# Patient Record
Sex: MALE | Race: WHITE | NOT HISPANIC OR LATINO | Employment: OTHER | URBAN - METROPOLITAN AREA
[De-identification: names, ages, dates, MRNs, and addresses within clinical notes are randomized per-mention and may not be internally consistent; named-entity substitution may affect disease eponyms.]

---

## 2018-08-14 ENCOUNTER — TRANSCRIBE ORDERS (OUTPATIENT)
Dept: ADMINISTRATIVE | Facility: HOSPITAL | Age: 58
End: 2018-08-14

## 2018-08-14 DIAGNOSIS — R94.39 ABNORMAL STRESS TEST: Primary | ICD-10-CM

## 2018-08-17 ENCOUNTER — HOSPITAL ENCOUNTER (OUTPATIENT)
Facility: HOSPITAL | Age: 58
Setting detail: HOSPITAL OUTPATIENT SURGERY
Discharge: HOME | End: 2018-08-17
Attending: INTERNAL MEDICINE | Admitting: INTERNAL MEDICINE

## 2018-08-17 VITALS
BODY MASS INDEX: 30.65 KG/M2 | OXYGEN SATURATION: 98 % | HEIGHT: 70 IN | DIASTOLIC BLOOD PRESSURE: 124 MMHG | TEMPERATURE: 96.9 F | SYSTOLIC BLOOD PRESSURE: 161 MMHG | HEART RATE: 48 BPM | WEIGHT: 214.07 LBS | RESPIRATION RATE: 18 BRPM

## 2018-08-17 DIAGNOSIS — R94.39 ABNORMAL STRESS TEST: ICD-10-CM

## 2018-08-17 LAB
ANION GAP SERPL CALCULATED.3IONS-SCNC: 5 MMOL/L (ref 3–11)
BUN BLD-MCNC: 24 MG/DL (ref 9–23)
BUN BLDA-MCNC: 27 MG/DL (ref 8–26)
BUN/CREAT SERPL: 17.1 (ref 7–25)
CA-I BLDA-SCNC: 1.22 MMOL/L (ref 1.2–1.32)
CALCIUM SPEC-SCNC: 9.6 MG/DL (ref 8.7–10.4)
CHLORIDE BLDA-SCNC: 97 MMOL/L (ref 98–109)
CHLORIDE SERPL-SCNC: 102 MMOL/L (ref 99–109)
CO2 BLDA-SCNC: 28 MMOL/L (ref 24–29)
CO2 SERPL-SCNC: 30 MMOL/L (ref 20–31)
CREAT BLD-MCNC: 1.4 MG/DL (ref 0.6–1.3)
CREAT BLDA-MCNC: 1.4 MG/DL (ref 0.6–1.3)
DEPRECATED RDW RBC AUTO: 42.5 FL (ref 37–54)
ERYTHROCYTE [DISTWIDTH] IN BLOOD BY AUTOMATED COUNT: 13.1 % (ref 11.3–14.5)
GFR SERPL CREATININE-BSD FRML MDRD: 52 ML/MIN/1.73
GLUCOSE BLD-MCNC: 116 MG/DL (ref 70–100)
GLUCOSE BLDC GLUCOMTR-MCNC: 115 MG/DL (ref 70–130)
HCT VFR BLD AUTO: 43.2 % (ref 38.9–50.9)
HCT VFR BLDA CALC: 43 % (ref 38–51)
HGB BLD-MCNC: 14.7 G/DL (ref 13.1–17.5)
HGB BLDA-MCNC: 14.6 G/DL (ref 12–17)
MCH RBC QN AUTO: 30.1 PG (ref 27–31)
MCHC RBC AUTO-ENTMCNC: 34 G/DL (ref 32–36)
MCV RBC AUTO: 88.3 FL (ref 80–99)
PLATELET # BLD AUTO: 214 10*3/MM3 (ref 150–450)
PMV BLD AUTO: 10.7 FL (ref 6–12)
POTASSIUM BLD-SCNC: 4.1 MMOL/L (ref 3.5–5.5)
POTASSIUM BLDA-SCNC: 4.2 MMOL/L (ref 3.5–4.9)
RBC # BLD AUTO: 4.89 10*6/MM3 (ref 4.2–5.76)
SODIUM BLD-SCNC: 137 MMOL/L (ref 132–146)
SODIUM BLDA-SCNC: 139 MMOL/L (ref 138–146)
WBC NRBC COR # BLD: 9.89 10*3/MM3 (ref 3.5–10.8)

## 2018-08-17 PROCEDURE — 25010000002 BIVALIRUDIN 5 MG/ML: Performed by: INTERNAL MEDICINE

## 2018-08-17 PROCEDURE — 93458 L HRT ARTERY/VENTRICLE ANGIO: CPT | Performed by: INTERNAL MEDICINE

## 2018-08-17 PROCEDURE — 25010000002 MIDAZOLAM PER 1 MG: Performed by: INTERNAL MEDICINE

## 2018-08-17 PROCEDURE — 92978 ENDOLUMINL IVUS OCT C 1ST: CPT | Performed by: INTERNAL MEDICINE

## 2018-08-17 PROCEDURE — C1887 CATHETER, GUIDING: HCPCS | Performed by: INTERNAL MEDICINE

## 2018-08-17 PROCEDURE — 85027 COMPLETE CBC AUTOMATED: CPT | Performed by: INTERNAL MEDICINE

## 2018-08-17 PROCEDURE — 80047 BASIC METABLC PNL IONIZED CA: CPT

## 2018-08-17 PROCEDURE — C1874 STENT, COATED/COV W/DEL SYS: HCPCS | Performed by: INTERNAL MEDICINE

## 2018-08-17 PROCEDURE — C1894 INTRO/SHEATH, NON-LASER: HCPCS | Performed by: INTERNAL MEDICINE

## 2018-08-17 PROCEDURE — 25010000002 FENTANYL CITRATE (PF) 100 MCG/2ML SOLUTION: Performed by: INTERNAL MEDICINE

## 2018-08-17 PROCEDURE — 25010000002 HEPARIN (PORCINE) PER 1000 UNITS: Performed by: INTERNAL MEDICINE

## 2018-08-17 PROCEDURE — 36415 COLL VENOUS BLD VENIPUNCTURE: CPT

## 2018-08-17 PROCEDURE — C1753 CATH, INTRAVAS ULTRASOUND: HCPCS | Performed by: INTERNAL MEDICINE

## 2018-08-17 PROCEDURE — C1769 GUIDE WIRE: HCPCS | Performed by: INTERNAL MEDICINE

## 2018-08-17 PROCEDURE — 80048 BASIC METABOLIC PNL TOTAL CA: CPT | Performed by: INTERNAL MEDICINE

## 2018-08-17 PROCEDURE — C9600 PERC DRUG-EL COR STENT SING: HCPCS | Performed by: INTERNAL MEDICINE

## 2018-08-17 PROCEDURE — 0 IOPAMIDOL PER 1 ML: Performed by: INTERNAL MEDICINE

## 2018-08-17 PROCEDURE — 85014 HEMATOCRIT: CPT

## 2018-08-17 PROCEDURE — 93005 ELECTROCARDIOGRAM TRACING: CPT | Performed by: INTERNAL MEDICINE

## 2018-08-17 DEVICE — XIENCE SIERRA™ EVEROLIMUS ELUTING CORONARY STENT SYSTEM 3.50 MM X 18 MM / RAPID-EXCHANGE
Type: IMPLANTABLE DEVICE | Status: FUNCTIONAL
Brand: XIENCE SIERRA™

## 2018-08-17 DEVICE — XIENCE SIERRA™ EVEROLIMUS ELUTING CORONARY STENT SYSTEM 2.50 MM X 23 MM / RAPID-EXCHANGE
Type: IMPLANTABLE DEVICE | Status: FUNCTIONAL
Brand: XIENCE SIERRA™

## 2018-08-17 RX ORDER — CLOPIDOGREL BISULFATE 75 MG/1
TABLET ORAL AS NEEDED
Status: DISCONTINUED | OUTPATIENT
Start: 2018-08-17 | End: 2018-08-17 | Stop reason: HOSPADM

## 2018-08-17 RX ORDER — CLOPIDOGREL BISULFATE 75 MG/1
75 TABLET ORAL DAILY
Qty: 30 TABLET | Refills: 11 | Status: SHIPPED | OUTPATIENT
Start: 2018-08-17 | End: 2018-08-17

## 2018-08-17 RX ORDER — OMEPRAZOLE 20 MG/1
20 CAPSULE, DELAYED RELEASE ORAL DAILY
COMMUNITY
End: 2020-09-22

## 2018-08-17 RX ORDER — TRIAMTERENE AND HYDROCHLOROTHIAZIDE 37.5; 25 MG/1; MG/1
1 CAPSULE ORAL EVERY MORNING
COMMUNITY
End: 2020-07-21 | Stop reason: ALTCHOICE

## 2018-08-17 RX ORDER — TEMAZEPAM 7.5 MG/1
7.5 CAPSULE ORAL NIGHTLY PRN
Status: DISCONTINUED | OUTPATIENT
Start: 2018-08-17 | End: 2018-08-17 | Stop reason: HOSPADM

## 2018-08-17 RX ORDER — MORPHINE SULFATE 4 MG/ML
1 INJECTION, SOLUTION INTRAMUSCULAR; INTRAVENOUS EVERY 4 HOURS PRN
Status: DISCONTINUED | OUTPATIENT
Start: 2018-08-17 | End: 2018-08-17 | Stop reason: HOSPADM

## 2018-08-17 RX ORDER — HYDROCODONE BITARTRATE AND ACETAMINOPHEN 5; 325 MG/1; MG/1
1 TABLET ORAL EVERY 4 HOURS PRN
Status: DISCONTINUED | OUTPATIENT
Start: 2018-08-17 | End: 2018-08-17 | Stop reason: HOSPADM

## 2018-08-17 RX ORDER — SODIUM CHLORIDE 9 MG/ML
250 INJECTION, SOLUTION INTRAVENOUS CONTINUOUS
Status: ACTIVE | OUTPATIENT
Start: 2018-08-17 | End: 2018-08-17

## 2018-08-17 RX ORDER — ASPIRIN 325 MG
325 TABLET ORAL DAILY
Status: DISCONTINUED | OUTPATIENT
Start: 2018-08-17 | End: 2018-08-17 | Stop reason: HOSPADM

## 2018-08-17 RX ORDER — ASPIRIN 81 MG/1
81 TABLET ORAL DAILY
COMMUNITY

## 2018-08-17 RX ORDER — MELOXICAM 15 MG/1
15 TABLET ORAL DAILY
COMMUNITY
End: 2020-09-22

## 2018-08-17 RX ORDER — ATORVASTATIN CALCIUM 40 MG/1
40 TABLET, FILM COATED ORAL DAILY
COMMUNITY
End: 2020-09-22

## 2018-08-17 RX ORDER — ACETAMINOPHEN 325 MG/1
650 TABLET ORAL EVERY 4 HOURS PRN
Status: DISCONTINUED | OUTPATIENT
Start: 2018-08-17 | End: 2018-08-17 | Stop reason: HOSPADM

## 2018-08-17 RX ORDER — MIDAZOLAM HYDROCHLORIDE 1 MG/ML
INJECTION INTRAMUSCULAR; INTRAVENOUS AS NEEDED
Status: DISCONTINUED | OUTPATIENT
Start: 2018-08-17 | End: 2018-08-17 | Stop reason: HOSPADM

## 2018-08-17 RX ORDER — CARVEDILOL 12.5 MG/1
12.5 TABLET ORAL 2 TIMES DAILY WITH MEALS
COMMUNITY
End: 2022-05-04 | Stop reason: SDUPTHER

## 2018-08-17 RX ORDER — CLOPIDOGREL BISULFATE 75 MG/1
75 TABLET ORAL DAILY
Qty: 30 TABLET | Refills: 11 | Status: SHIPPED | OUTPATIENT
Start: 2018-08-17 | End: 2022-05-04 | Stop reason: ALTCHOICE

## 2018-08-17 RX ORDER — ALPRAZOLAM 0.25 MG/1
0.25 TABLET ORAL 3 TIMES DAILY PRN
Status: DISCONTINUED | OUTPATIENT
Start: 2018-08-17 | End: 2018-08-17 | Stop reason: HOSPADM

## 2018-08-17 RX ORDER — NALOXONE HCL 0.4 MG/ML
0.4 VIAL (ML) INJECTION
Status: DISCONTINUED | OUTPATIENT
Start: 2018-08-17 | End: 2018-08-17 | Stop reason: HOSPADM

## 2018-08-17 RX ORDER — LIDOCAINE HYDROCHLORIDE 10 MG/ML
INJECTION, SOLUTION INFILTRATION; PERINEURAL AS NEEDED
Status: DISCONTINUED | OUTPATIENT
Start: 2018-08-17 | End: 2018-08-17 | Stop reason: HOSPADM

## 2018-08-17 RX ORDER — VARENICLINE TARTRATE 1 MG/1
1 TABLET, FILM COATED ORAL 2 TIMES DAILY
COMMUNITY
End: 2022-05-04

## 2018-08-17 RX ORDER — FENTANYL CITRATE 50 UG/ML
INJECTION, SOLUTION INTRAMUSCULAR; INTRAVENOUS AS NEEDED
Status: DISCONTINUED | OUTPATIENT
Start: 2018-08-17 | End: 2018-08-17 | Stop reason: HOSPADM

## 2018-08-17 RX ADMIN — ASPIRIN 325 MG ORAL TABLET 325 MG: 325 PILL ORAL at 07:05

## 2018-08-17 NOTE — PLAN OF CARE
Problem: Patient Care Overview  Goal: Plan of Care Review  Outcome: Outcome(s) achieved Date Met: 08/17/18 08/17/18 1047   Coping/Psychosocial   Plan of Care Reviewed With patient   Plan of Care Review   Progress no change   OTHER   Outcome Summary Patient's site stable at time of discharge. The patient is being discharged home with family.      Goal: Individualization and Mutuality  Outcome: Outcome(s) achieved Date Met: 08/17/18    Goal: Discharge Needs Assessment  Outcome: Outcome(s) achieved Date Met: 08/17/18    Goal: Interprofessional Rounds/Family Conf  Outcome: Outcome(s) achieved Date Met: 08/17/18

## 2020-07-21 ENCOUNTER — NURSE NAVIGATOR (OUTPATIENT)
Dept: ONCOLOGY | Facility: CLINIC | Age: 60
End: 2020-07-21

## 2020-07-21 ENCOUNTER — OFFICE VISIT (OUTPATIENT)
Dept: PULMONOLOGY | Facility: CLINIC | Age: 60
End: 2020-07-21

## 2020-07-21 VITALS
BODY MASS INDEX: 33.12 KG/M2 | TEMPERATURE: 98.7 F | HEART RATE: 60 BPM | OXYGEN SATURATION: 96 % | WEIGHT: 236.6 LBS | HEIGHT: 71 IN | SYSTOLIC BLOOD PRESSURE: 124 MMHG | DIASTOLIC BLOOD PRESSURE: 76 MMHG

## 2020-07-21 DIAGNOSIS — R91.8 LUNG MASS: ICD-10-CM

## 2020-07-21 DIAGNOSIS — R91.1 LUNG NODULE: Primary | ICD-10-CM

## 2020-07-21 DIAGNOSIS — Z00.6 EXAMINATION FOR NORMAL COMPARISON FOR CLINICAL RESEARCH: Primary | ICD-10-CM

## 2020-07-21 PROCEDURE — 99205 OFFICE O/P NEW HI 60 MIN: CPT | Performed by: INTERNAL MEDICINE

## 2020-07-21 RX ORDER — IRBESARTAN 150 MG/1
150 TABLET ORAL DAILY
COMMUNITY
End: 2022-04-20 | Stop reason: SDUPTHER

## 2020-07-21 NOTE — PROGRESS NOTES
CC:    Lung mass    HPI:    59 y/o WM w/ h/o HTN, HLD, CAD s/p 8 stents last ~ 1 year ago (follows with Dr. Rosen) - remains on DAPT, Tobacco abuse since age 9 / ~ 1ppd / ~50 py - still smoking but trying to quit on chantix, who comes today for evaluation of lung mass.  Patient recently presented to PCP office for chronic non-productive cough x 8-9 months.  He had a LDCT for lung cancer screening and this revealed a lobular irregular marginated mass in the JUDITH measuring 6.5 x 4.4 cm.  There's also a 9 mm RUL nodule and 8 mm RML nodule.  Mediastinal lymph nodes appear relatively normal.    Patient has not had any fever, chills, night sweats, weight loss, hemoptysis, chest pain, etc.  No recent episodes of pneumonia he can recall.  No prior films for comparison.    PMH:    Past Medical History:   Diagnosis Date   • Coronary artery disease    • Hyperlipidemia    • Hypertension      PSH:    Past Surgical History:   Procedure Laterality Date   • APPENDECTOMY     • CARDIAC CATHETERIZATION     • CARDIAC CATHETERIZATION N/A 8/17/2018    Procedure: Left Heart Cath;  Surgeon: Janes Rosen MD;  Location: [facility] CATH INVASIVE LOCATION;  Service: Cardiovascular   • TIBIA FRACTURE SURGERY Bilateral      FH:    No family history on file.  SH:    Social History     Socioeconomic History   • Marital status:      Spouse name: Not on file   • Number of children: Not on file   • Years of education: Not on file   • Highest education level: Not on file   Tobacco Use   • Smoking status: Current Every Day Smoker     Packs/day: 1.00     Years: 50.00     Pack years: 50.00     Types: Cigarettes   • Smokeless tobacco: Never Used   Substance and Sexual Activity   • Alcohol use: Yes     Alcohol/week: 6.0 standard drinks     Types: 6 Cans of beer per week     Comment: weekly   • Drug use: No   • Sexual activity: Defer     ALLERGIES:    No Known Allergies  MEDICATIONS:      Current Outpatient Medications:   •  Alirocumab  (Praluent) 150 MG/ML solution prefilled syringe, Inject  under the skin into the appropriate area as directed., Disp: , Rfl:   •  aspirin 81 MG EC tablet, Take 81 mg by mouth Daily., Disp: , Rfl:   •  atorvastatin (LIPITOR) 40 MG tablet, Take 40 mg by mouth Daily., Disp: , Rfl:   •  carvedilol (COREG) 6.25 MG tablet, Take 6.25 mg by mouth 2 (Two) Times a Day With Meals., Disp: , Rfl:   •  clopidogrel (PLAVIX) 75 MG tablet, Take 1 tablet by mouth Daily., Disp: 30 tablet, Rfl: 11  •  irbesartan (AVAPRO) 150 MG tablet, Take 150 mg by mouth Every Night., Disp: , Rfl:   •  meloxicam (MOBIC) 15 MG tablet, Take 15 mg by mouth Daily., Disp: , Rfl:   •  omeprazole (priLOSEC) 20 MG capsule, Take 20 mg by mouth Daily., Disp: , Rfl:   •  tiotropium bromide-olodaterol (Stiolto Respimat) 2.5-2.5 MCG/ACT aerosol solution inhaler, Inhale Daily., Disp: , Rfl:   •  varenicline (CHANTIX) 1 MG tablet, Take 1 mg by mouth 2 (Two) Times a Day., Disp: , Rfl:   ROS:  Per HPI, otherwise all systems reviewed and negative.    DIAGNOSTIC DATA (Reviewed and interpreted by me unless otherwise specified):    CT Chest 7/16/20 -  lobular irregular marginated mass in the JUDITH measuring 6.5 x 4.4 cm.  There's also a 9 mm RUL nodule and 8 mm RML nodule.  Mediastinal lymph nodes appear relatively normal.  Scarring in peripheral bases.    Vitals:    07/21/20 1046   BP: 124/76   Pulse: 60   Temp: 98.7 °F (37.1 °C)   SpO2: 96%       Physical Exam   Constitutional: Oriented to person, place, and time. Appears well-developed and well-nourished.   Head: Normocephalic and atraumatic.   Nose: Nose normal.   Mouth/Throat: Oropharynx is clear and moist.   Eyes: Conjunctivae are normal.  Pupils normal.  Neck: No tracheal deviation present.   Cardiovascular: Normal rate, regular rhythm, normal heart sounds and intact distal pulses.  Exam reveals no gallop and no friction rub.  No thrill.  No JVD.  No edema.  No murmur heard.  Pulmonary/Chest: Effort normal and  breath sounds normal.  No tenderness to palpation.  No clubbing.   Abdominal: Soft. Bowel sounds are normal. No distension. No tenderness. There is no guarding.   Musculoskeletal: Normal range of motion.  No tenderness.  Lymphadenopathy:  No cervical adenopathy.   Neurological:  No new focal neurological deficits observed   Skin: Skin is warm and dry. No rash noted.   Psychiatric: Normal mood and affect.  Behavior is normal. Judgment normal.    Assessment/Plan     1)  Abnormal CT Chest - concerning for malignancy.  Mediastinal LN's look relatively normal.  I believe highest yield will be a CT guided biopsy.  He is on plavix which will need to be held at discretion of IR.  Would keep on ASA given numerous prior stents (last more than 1 year ago).  If this proves to be malignancy will need PET-CT and MRI brain.  PFT next visit as well.      2)  Tobacco abuse - on chantix, trying to quit    Will call patient with results of biopsy and plan    RTC 4 weeks with Full PFT    Asaf Lobato MD  Pulmonology and Critical Care Medicine  07/21/20 11:57  Electronically Signed    C.C.:  Siddharth Godwin MD, Siddharth Godwin MD

## 2020-07-21 NOTE — PROGRESS NOTES
Met patient in lung nodule clinic with Dr. KEVIN Lobato. Patient has smoked since age 9 and is actively trying to quit now. He developed cough x8-9mo ago and recently had CT chest ordered. CT revealed 6.5 x 4.4cm JUDITH mass. Patient denies hemoptysis, weight loss, fever or new SOA. He did recently start Stiolto with reported improvement in breathing. Scans reviewed. Per Dr. Lobato CT guided biopsy now. Introduced lung navigator role and provided contact information. Plavix will need to be held prior to procedure x5 days. He v/u and agreeable to plan. He knows to call with questions or concerns.

## 2020-07-24 ENCOUNTER — NURSE NAVIGATOR (OUTPATIENT)
Dept: ONCOLOGY | Facility: CLINIC | Age: 60
End: 2020-07-24

## 2020-07-24 NOTE — PROGRESS NOTES
Called patient to ensure he is aware of upcoming appointment dates, times and locations. He is able to recall COVID testing and biopsy information. Reminded patient he would need to stop anticoagulant prior to biopsy. States he needs to call and talk with Dr. Rosen about this. Patient called back and states the office started asking questions he could not answer. Navigator called Dr. Rsoen's office to notify them of plan and ask for directions on stopping anticoagulants. They will fax form to Dr. Lobato's office. Notified pulmonary office to be expecting information from Dr. Rosen's office.

## 2020-07-28 ENCOUNTER — APPOINTMENT (OUTPATIENT)
Dept: PREADMISSION TESTING | Facility: HOSPITAL | Age: 60
End: 2020-07-28

## 2020-07-28 PROCEDURE — U0004 COV-19 TEST NON-CDC HGH THRU: HCPCS

## 2020-07-28 PROCEDURE — C9803 HOPD COVID-19 SPEC COLLECT: HCPCS

## 2020-07-28 PROCEDURE — U0002 COVID-19 LAB TEST NON-CDC: HCPCS

## 2020-07-29 LAB
REF LAB TEST METHOD: NORMAL
SARS-COV-2 RNA RESP QL NAA+PROBE: NOT DETECTED

## 2020-07-30 ENCOUNTER — TELEPHONE (OUTPATIENT)
Dept: INFUSION THERAPY | Facility: HOSPITAL | Age: 60
End: 2020-07-30

## 2020-07-31 ENCOUNTER — HOSPITAL ENCOUNTER (OUTPATIENT)
Dept: CT IMAGING | Facility: HOSPITAL | Age: 60
Discharge: HOME | End: 2020-07-31
Admitting: INTERNAL MEDICINE

## 2020-07-31 ENCOUNTER — HOSPITAL ENCOUNTER (OUTPATIENT)
Dept: GENERAL RADIOLOGY | Facility: HOSPITAL | Age: 60
Discharge: HOME | End: 2020-07-31

## 2020-07-31 VITALS
HEART RATE: 58 BPM | WEIGHT: 237 LBS | HEIGHT: 70 IN | BODY MASS INDEX: 33.93 KG/M2 | OXYGEN SATURATION: 94 % | TEMPERATURE: 97.2 F | SYSTOLIC BLOOD PRESSURE: 151 MMHG | DIASTOLIC BLOOD PRESSURE: 80 MMHG | RESPIRATION RATE: 20 BRPM

## 2020-07-31 DIAGNOSIS — R91.8 LUNG MASS: ICD-10-CM

## 2020-07-31 LAB
APTT PPP: 32.6 SECONDS (ref 24–37)
INR PPP: 0.99 (ref 0.85–1.16)
PLATELET # BLD AUTO: 276 10*3/MM3 (ref 140–450)
PROTHROMBIN TIME: 12.8 SECONDS (ref 11.5–14)

## 2020-07-31 PROCEDURE — 25010000002 MIDAZOLAM PER 1 MG: Performed by: RADIOLOGY

## 2020-07-31 PROCEDURE — 85730 THROMBOPLASTIN TIME PARTIAL: CPT | Performed by: RADIOLOGY

## 2020-07-31 PROCEDURE — 88342 IMHCHEM/IMCYTCHM 1ST ANTB: CPT | Performed by: INTERNAL MEDICINE

## 2020-07-31 PROCEDURE — 85049 AUTOMATED PLATELET COUNT: CPT | Performed by: RADIOLOGY

## 2020-07-31 PROCEDURE — 71045 X-RAY EXAM CHEST 1 VIEW: CPT

## 2020-07-31 PROCEDURE — 99153 MOD SED SAME PHYS/QHP EA: CPT

## 2020-07-31 PROCEDURE — 77012 CT SCAN FOR NEEDLE BIOPSY: CPT

## 2020-07-31 PROCEDURE — 99152 MOD SED SAME PHYS/QHP 5/>YRS: CPT

## 2020-07-31 PROCEDURE — 88341 IMHCHEM/IMCYTCHM EA ADD ANTB: CPT | Performed by: INTERNAL MEDICINE

## 2020-07-31 PROCEDURE — 85610 PROTHROMBIN TIME: CPT | Performed by: RADIOLOGY

## 2020-07-31 PROCEDURE — 88305 TISSUE EXAM BY PATHOLOGIST: CPT | Performed by: INTERNAL MEDICINE

## 2020-07-31 PROCEDURE — 25010000002 FENTANYL CITRATE (PF) 100 MCG/2ML SOLUTION: Performed by: RADIOLOGY

## 2020-07-31 RX ORDER — CHOLECALCIFEROL (VITAMIN D3) 125 MCG
500 CAPSULE ORAL DAILY
COMMUNITY
End: 2022-10-07 | Stop reason: HOSPADM

## 2020-07-31 RX ORDER — SODIUM CHLORIDE 0.9 % (FLUSH) 0.9 %
10 SYRINGE (ML) INJECTION AS NEEDED
Status: DISCONTINUED | OUTPATIENT
Start: 2020-07-31 | End: 2020-08-01 | Stop reason: HOSPADM

## 2020-07-31 RX ORDER — SODIUM CHLORIDE 0.9 % (FLUSH) 0.9 %
3 SYRINGE (ML) INJECTION EVERY 12 HOURS SCHEDULED
Status: DISCONTINUED | OUTPATIENT
Start: 2020-07-31 | End: 2020-08-01 | Stop reason: HOSPADM

## 2020-07-31 RX ORDER — FENTANYL CITRATE 50 UG/ML
INJECTION, SOLUTION INTRAMUSCULAR; INTRAVENOUS
Status: DISPENSED
Start: 2020-07-31 | End: 2020-07-31

## 2020-07-31 RX ORDER — LIDOCAINE HYDROCHLORIDE 10 MG/ML
20 INJECTION, SOLUTION EPIDURAL; INFILTRATION; INTRACAUDAL; PERINEURAL ONCE
Status: COMPLETED | OUTPATIENT
Start: 2020-07-31 | End: 2020-07-31

## 2020-07-31 RX ORDER — MIDAZOLAM HYDROCHLORIDE 1 MG/ML
INJECTION INTRAMUSCULAR; INTRAVENOUS
Status: COMPLETED | OUTPATIENT
Start: 2020-07-31 | End: 2020-07-31

## 2020-07-31 RX ORDER — PANTOPRAZOLE SODIUM 40 MG/1
40 TABLET, DELAYED RELEASE ORAL 2 TIMES DAILY
COMMUNITY
End: 2022-10-18

## 2020-07-31 RX ORDER — CHOLECALCIFEROL (VITAMIN D3) 125 MCG
CAPSULE ORAL DAILY
COMMUNITY
End: 2022-10-07 | Stop reason: HOSPADM

## 2020-07-31 RX ORDER — MIDAZOLAM HYDROCHLORIDE 1 MG/ML
INJECTION INTRAMUSCULAR; INTRAVENOUS
Status: DISPENSED
Start: 2020-07-31 | End: 2020-07-31

## 2020-07-31 RX ORDER — FENTANYL CITRATE 50 UG/ML
INJECTION, SOLUTION INTRAMUSCULAR; INTRAVENOUS
Status: COMPLETED | OUTPATIENT
Start: 2020-07-31 | End: 2020-07-31

## 2020-07-31 RX ORDER — VIT C/HESPERIDIN/BIOFLAVONOIDS 500-100 MG
TABLET ORAL DAILY
COMMUNITY
End: 2022-05-04

## 2020-07-31 RX ADMIN — MIDAZOLAM 1 MG: 1 INJECTION INTRAMUSCULAR; INTRAVENOUS at 08:52

## 2020-07-31 RX ADMIN — FENTANYL CITRATE 50 MCG: 50 INJECTION, SOLUTION INTRAMUSCULAR; INTRAVENOUS at 08:29

## 2020-07-31 RX ADMIN — FENTANYL CITRATE 50 MCG: 50 INJECTION, SOLUTION INTRAMUSCULAR; INTRAVENOUS at 08:36

## 2020-07-31 RX ADMIN — MIDAZOLAM 1 MG: 1 INJECTION INTRAMUSCULAR; INTRAVENOUS at 08:36

## 2020-07-31 RX ADMIN — MIDAZOLAM 2 MG: 1 INJECTION INTRAMUSCULAR; INTRAVENOUS at 08:29

## 2020-07-31 RX ADMIN — FENTANYL CITRATE 50 MCG: 50 INJECTION, SOLUTION INTRAMUSCULAR; INTRAVENOUS at 08:44

## 2020-07-31 RX ADMIN — LIDOCAINE HYDROCHLORIDE 20 ML: 10 INJECTION, SOLUTION EPIDURAL; INFILTRATION; INTRACAUDAL; PERINEURAL at 08:39

## 2020-07-31 RX ADMIN — FENTANYL CITRATE 50 MCG: 50 INJECTION, SOLUTION INTRAMUSCULAR; INTRAVENOUS at 08:52

## 2020-07-31 RX ADMIN — MIDAZOLAM 1 MG: 1 INJECTION INTRAMUSCULAR; INTRAVENOUS at 08:44

## 2020-08-03 ENCOUNTER — TELEPHONE (OUTPATIENT)
Dept: INFUSION THERAPY | Facility: HOSPITAL | Age: 60
End: 2020-08-03

## 2020-08-05 ENCOUNTER — DOCUMENTATION (OUTPATIENT)
Dept: PULMONOLOGY | Facility: CLINIC | Age: 60
End: 2020-08-05

## 2020-08-05 DIAGNOSIS — C34.92 PRIMARY LUNG CANCER, LEFT (HCC): Primary | ICD-10-CM

## 2020-08-05 NOTE — PROGRESS NOTES
Tried to call patient with results of biopsy and got VM.  Will try again tomorrow.    Asaf Lobato MD  Pulmonology and Critical Care Medicine  08/05/20 15:21  Electronically Signed

## 2020-08-06 ENCOUNTER — TELEPHONE (OUTPATIENT)
Dept: PULMONOLOGY | Facility: CLINIC | Age: 60
End: 2020-08-06

## 2020-08-06 NOTE — TELEPHONE ENCOUNTER
Called patient with results of biopsy consistent with adenocarcinoma.  PET-CT, MRI brain, Onc referral pending.  Depending on results may need colonoscopy per pathologist.  Patient has never had one.  He verbalized understanding.    Asaf Lobato MD  Pulmonology and Critical Care Medicine  08/06/20 15:57  Electronically Signed

## 2020-08-07 ENCOUNTER — NURSE NAVIGATOR (OUTPATIENT)
Dept: ONCOLOGY | Facility: CLINIC | Age: 60
End: 2020-08-07

## 2020-08-07 ENCOUNTER — TRANSCRIBE ORDERS (OUTPATIENT)
Dept: PULMONOLOGY | Facility: CLINIC | Age: 60
End: 2020-08-07

## 2020-08-07 NOTE — PROGRESS NOTES
Called patient with medical oncology appointment. He is aware of date, time, location and provider. He understands to be expecting new patient packet in the mail. He is aware of PET and MRI brain appointments on 08/10/20 at [facility] and [facility] respectively and to obtain disc of images to bring to Dr. Haro. Informed him to be NPO except water prior to PET. Answered all questions satisfactorily. He knows to call with questions or concerns.

## 2020-08-11 DIAGNOSIS — C34.92 PRIMARY LUNG CANCER, LEFT (HCC): ICD-10-CM

## 2020-08-13 ENCOUNTER — NURSE NAVIGATOR (OUTPATIENT)
Dept: ONCOLOGY | Facility: CLINIC | Age: 60
End: 2020-08-13

## 2020-08-13 ENCOUNTER — CONSULT (OUTPATIENT)
Dept: ONCOLOGY | Facility: CLINIC | Age: 60
End: 2020-08-13

## 2020-08-13 ENCOUNTER — APPOINTMENT (OUTPATIENT)
Dept: OTHER | Facility: HOSPITAL | Age: 60
End: 2020-08-13

## 2020-08-13 ENCOUNTER — LAB (OUTPATIENT)
Dept: LAB | Facility: HOSPITAL | Age: 60
End: 2020-08-13

## 2020-08-13 VITALS
HEART RATE: 54 BPM | HEIGHT: 70 IN | WEIGHT: 236.7 LBS | TEMPERATURE: 98 F | DIASTOLIC BLOOD PRESSURE: 88 MMHG | OXYGEN SATURATION: 99 % | RESPIRATION RATE: 18 BRPM | SYSTOLIC BLOOD PRESSURE: 157 MMHG | BODY MASS INDEX: 33.89 KG/M2

## 2020-08-13 DIAGNOSIS — R94.39 ABNORMAL STRESS TEST: ICD-10-CM

## 2020-08-13 DIAGNOSIS — Z00.6 EXAMINATION FOR NORMAL COMPARISON FOR CLINICAL RESEARCH: ICD-10-CM

## 2020-08-13 DIAGNOSIS — R94.39 ABNORMAL STRESS TEST: Primary | ICD-10-CM

## 2020-08-13 LAB
ALBUMIN SERPL-MCNC: 4.4 G/DL (ref 3.5–5.2)
ALBUMIN/GLOB SERPL: 1.5 G/DL
ALP SERPL-CCNC: 92 U/L (ref 39–117)
ALT SERPL W P-5'-P-CCNC: 54 U/L (ref 1–41)
ANION GAP SERPL CALCULATED.3IONS-SCNC: 9 MMOL/L (ref 5–15)
AST SERPL-CCNC: 40 U/L (ref 1–40)
BILIRUB SERPL-MCNC: 0.8 MG/DL (ref 0–1.2)
BUN SERPL-MCNC: 13 MG/DL (ref 8–23)
BUN/CREAT SERPL: 13.1 (ref 7–25)
CALCIUM SPEC-SCNC: 9.7 MG/DL (ref 8.6–10.5)
CHLORIDE SERPL-SCNC: 104 MMOL/L (ref 98–107)
CO2 SERPL-SCNC: 27 MMOL/L (ref 22–29)
CREAT SERPL-MCNC: 0.99 MG/DL (ref 0.76–1.27)
ERYTHROCYTE [DISTWIDTH] IN BLOOD BY AUTOMATED COUNT: 12.8 % (ref 12.3–15.4)
GFR SERPL CREATININE-BSD FRML MDRD: 77 ML/MIN/1.73
GLOBULIN UR ELPH-MCNC: 3 GM/DL
GLUCOSE SERPL-MCNC: 99 MG/DL (ref 65–99)
HCT VFR BLD AUTO: 48.6 % (ref 37.5–51)
HGB BLD-MCNC: 16.1 G/DL (ref 13–17.7)
LYMPHOCYTES # BLD AUTO: 2.8 10*3/MM3 (ref 0.7–3.1)
LYMPHOCYTES NFR BLD AUTO: 29.4 % (ref 19.6–45.3)
MCH RBC QN AUTO: 30.1 PG (ref 26.6–33)
MCHC RBC AUTO-ENTMCNC: 33.1 G/DL (ref 31.5–35.7)
MCV RBC AUTO: 91.1 FL (ref 79–97)
MONOCYTES # BLD AUTO: 0.5 10*3/MM3 (ref 0.1–0.9)
MONOCYTES NFR BLD AUTO: 5.3 % (ref 5–12)
NEUTROPHILS NFR BLD AUTO: 6.1 10*3/MM3 (ref 1.7–7)
NEUTROPHILS NFR BLD AUTO: 65.3 % (ref 42.7–76)
PLATELET # BLD AUTO: 192 10*3/MM3 (ref 140–450)
PMV BLD AUTO: 8.2 FL (ref 6–12)
POTASSIUM SERPL-SCNC: 4.9 MMOL/L (ref 3.5–5.2)
PROT SERPL-MCNC: 7.4 G/DL (ref 6–8.5)
RBC # BLD AUTO: 5.33 10*6/MM3 (ref 4.14–5.8)
SODIUM SERPL-SCNC: 140 MMOL/L (ref 136–145)
WBC # BLD AUTO: 9.4 10*3/MM3 (ref 3.4–10.8)

## 2020-08-13 PROCEDURE — 99245 OFF/OP CONSLTJ NEW/EST HI 55: CPT | Performed by: INTERNAL MEDICINE

## 2020-08-13 PROCEDURE — 80053 COMPREHEN METABOLIC PANEL: CPT

## 2020-08-13 PROCEDURE — 85025 COMPLETE CBC W/AUTO DIFF WBC: CPT

## 2020-08-13 PROCEDURE — 36415 COLL VENOUS BLD VENIPUNCTURE: CPT

## 2020-08-13 NOTE — PROGRESS NOTES
Met patient and daughter in medical oncology appointment with Dr. Haro. PET, MRI brain and pathology discussed at length. Due to staining on tissue will refer patient for colonoscopy and EGD. He will need cardiac clearance from Dr. Rosen for possible thoracic surgery in light of dual anticoagulant therapy. He will also need PFT's. These appointments and procedures will need to be made expeditiously as patient will f/u with Dr. Haro 08/27/20 to make final treatment decision based on these findings. He will need to be presented at tumor board prior to f/u with Dr. Haro as well. Nurse navigator to help in this process. Disc of PET and MRI taken to radiology to be uploaded into Epic. Patient knows to call with questions or concerns.

## 2020-08-13 NOTE — PROGRESS NOTES
CHIEF COMPLAINT: Adenocarcinoma of lung versus metastasis from occult GI primary    REASON FOR REFERRAL: Same      RECORDS OBTAINED  Records of the patients history including those obtained from Asaf Lobato were reviewed and summarized in detail.    Oncology/Hematology History    1.  Probable adenocarcinoma of the lung versus metastasis from GI tract for tissue staining but no evidence of such on PET  2.  Hypertension  3.  Hyperlipidemia  4.  Coronary disease status post 8 stents with Dr. Rosen 2019 remains on dual antiplatelet therapy  5.  50-pack-year smoker persistent      -Summer 2020 presented to primary care with chronic cough.  -CT chest done at [facility] in [place] showed 6.5 cm left upper lobe mass with 2 noncalcified nodules in the right lung, 9 mm inferior right upper lobe, 8 mm right middle lobe, and scarring with fibrosis.  -7/31/2020 left upper lobe CT-guided lung biopsy showed adenocarcinoma with mucinous features diffuse positive for CK 7 and patchy positive for CK 20, moderately positive for CEA and CDX 2 and negative for TTF-1 and Napsin.  -8/10/2020 PET scan [facility] shows left upper lobe mass SUV 11.3, 6.5 cm.  6 mm inferior right middle lobe nodule not PET avid.  MRI brain done at [facility] showed white matter changes but no metastasis.  -8/13/2020 [facility] initial consultation: There is still some uncertainty as to whether this is metastatic disease to the lung with a large metastasis and other smaller non-PET avid foci versus a lung primary with non-PET avid small lung nodules that may yet be metastatic but not hypermetabolic due to their small size versus a lung primary that should be resected and the other lesions are just benign and therefore not PET avid.  With dual antiplatelet therapy and multiple coronary vessel disease there will be significant operative risk that will need to be addressed by Dr. Rosen.  Would  recommend upper or lower GI endoscopy given the CDX 2 and the CK7 positivity for completeness sake especially since the TTF-1 is negative and yet this looks like an adenocarcinoma.  Per my discussion with Dr. Asaf Lobato, we will get him to Dr. Rosen for cardiac clearance and to determine whether he can come off any of his antiplatelet therapies, to gastroenterology for upper and lower GI endoscopy to be sure that there is no occult GI primary that has now metastasized to the lung, to Dr. Lobato's office for pulmonary function testing for adequacy of lung function, and we will have Alina Strauss to present him at multidisciplinary lung conference in the next couple of weeks once this data is available to decide on our plan of attack and ultimately to Dr. Luis Booth for surgical resection as appropriate.  We will get his discs from outside PET and MRI brain downloaded into our system.        Abnormal stress test       HISTORY OF PRESENT ILLNESS:  The patient is a 60 y.o.  male, referred for possible lung cancer versus metastasis from GI primary.  See above for oncology history of present illness.  Has also had significant cardiovascular disease as outlined above.    REVIEW OF SYSTEMS:  A 14 point review of systems was performed and is negative except as noted above.    Past Medical History:   Diagnosis Date   • Anticoagulated    • COPD (chronic obstructive pulmonary disease) (CMS/HCC)    • Coronary artery disease    • Fractures    • GERD (gastroesophageal reflux disease)    • Heart murmur    • Hyperlipidemia    • Hypertension    • Lung mass     lung biopsy today 7/31/2020   • Prediabetes      Past Surgical History:   Procedure Laterality Date   • AMPUTATION FINGER / THUMB Right    • APPENDECTOMY     • CARDIAC CATHETERIZATION     • CARDIAC CATHETERIZATION N/A 8/17/2018    Procedure: Left Heart Cath;  Surgeon: Janes Rosen MD;  Location: [facility] CATH INVASIVE LOCATION;  Service: Cardiovascular   •  CORONARY STENT PLACEMENT      x8   • TIBIA FRACTURE SURGERY Bilateral    • TRIGGER FINGER RELEASE Bilateral        Current Outpatient Medications on File Prior to Visit   Medication Sig Dispense Refill   • Alirocumab (Praluent) 150 MG/ML solution prefilled syringe Inject  under the skin into the appropriate area as directed.     • aspirin 81 MG EC tablet Take 81 mg by mouth Daily.     • atorvastatin (LIPITOR) 40 MG tablet Take 40 mg by mouth Daily.     • carvedilol (COREG) 6.25 MG tablet Take 12.5 mg by mouth 2 (Two) Times a Day With Meals.     • Cholecalciferol (VITAMIN D3) 50 MCG (2000 UT) tablet Take  by mouth Daily.     • clopidogrel (PLAVIX) 75 MG tablet Take 1 tablet by mouth Daily. 30 tablet 11   • irbesartan (AVAPRO) 150 MG tablet Take 150 mg by mouth Daily.     • meloxicam (MOBIC) 15 MG tablet Take 15 mg by mouth Daily.     • metFORMIN (GLUCOPHAGE) 500 MG tablet Take 500 mg by mouth Every Evening.     • omeprazole (priLOSEC) 20 MG capsule Take 20 mg by mouth Daily.     • pantoprazole (PROTONIX) 40 MG EC tablet Take 40 mg by mouth Daily.     • tiotropium bromide-olodaterol (Stiolto Respimat) 2.5-2.5 MCG/ACT aerosol solution inhaler Inhale Daily.     • varenicline (CHANTIX) 1 MG tablet Take 1 mg by mouth 2 (Two) Times a Day.     • vitamin B-12 (CYANOCOBALAMIN) 500 MCG tablet Take 500 mcg by mouth Daily.     • Zinc 30 MG tablet Take  by mouth Daily.       No current facility-administered medications on file prior to visit.        No Known Allergies    Social History     Socioeconomic History   • Marital status:      Spouse name: Not on file   • Number of children: Not on file   • Years of education: Not on file   • Highest education level: Not on file   Tobacco Use   • Smoking status: Former Smoker     Packs/day: 1.00     Years: 45.00     Pack years: 45.00     Types: Cigarettes     Start date: 7/6/2020   • Smokeless tobacco: Never Used   Substance and Sexual Activity   • Alcohol use: Yes      "Alcohol/week: 6.0 standard drinks     Types: 6 Cans of beer per week     Comment: weekly   • Drug use: No   • Sexual activity: Defer       History reviewed. No pertinent family history.    PHYSICAL EXAM:    /88   Pulse 54   Temp 98 °F (36.7 °C) (Temporal)   Resp 18   Ht 177.8 cm (70\")   Wt 107 kg (236 lb 11.2 oz)   SpO2 99%   BMI 33.96 kg/m²     ECOG: (0) Fully Active - Able to Carry On All Pre-disease Performance Without Restriction  General: well appearing male in no acute distress  HEENT: sclera anicteric, oropharynx clear  Lymphatics: no cervical, supraclavicular, inguinal, or axillary adenopathy  Cardiovascular: regular rate and rhythm, no murmurs  Neck: Supple; No thyromegaly  Lungs: clear to auscultation bilaterally. No respiratory distress.   Abdomen: soft, nontender, nondistended.  No palpable organomegaly  Extremities: no cyanosis, clubbing, edema, or cords  Skin: no rashes, lesions, bruising, or petechiae  Neuro: Alert and oriented x 4; Moving all extremities.  Psych: No anxiety or depression    Lab Results   Component Value Date    HGB 14.6 08/17/2018    HCT 43 08/17/2018    MCV 88.3 08/17/2018     07/31/2020    WBC 9.89 08/17/2018     Lab Results   Component Value Date    GLUCOSE 116 (H) 08/17/2018    BUN 24 (H) 08/17/2018    CREATININE 1.40 (H) 08/17/2018     08/17/2018    K 4.1 08/17/2018     08/17/2018    CO2 30.0 08/17/2018    CALCIUM 9.6 08/17/2018           Assessment/Plan   1.  Probable adenocarcinoma of the lung versus metastasis from GI tract for tissue staining but no evidence of such on PET  2.  Hypertension  3.  Hyperlipidemia  4.  Coronary disease status post 8 stents with Dr. Rosen 2019 remains on dual antiplatelet therapy  5.  50-pack-year smoker persistent      -Summer 2020 presented to primary care with chronic cough.  -CT chest done at [facility] in [place] showed 6.5 cm left upper lobe mass with 2 noncalcified nodules in the right " lung, 9 mm inferior right upper lobe, 8 mm right middle lobe, and scarring with fibrosis.  -7/31/2020 left upper lobe CT-guided lung biopsy showed adenocarcinoma with mucinous features diffuse positive for CK 7 and patchy positive for CK 20, moderately positive for CEA and CDX 2 and negative for TTF-1 and Napsin.  -8/10/2020 PET scan [facility] shows left upper lobe mass SUV 11.3, 6.5 cm.  6 mm inferior right middle lobe nodule not PET avid.  MRI brain done at [facility] showed white matter changes but no metastasis.  -8/13/2020 [facility] oncology initial consultation: There is still some uncertainty as to whether this is metastatic disease to the lung with a large metastasis and other smaller non-PET avid foci versus a lung primary with non-PET avid small lung nodules that may yet be metastatic but not hypermetabolic due to their small size versus a lung primary that should be resected and the other lesions are just benign and therefore not PET avid.  With dual antiplatelet therapy and multiple coronary vessel disease there will be significant operative risk that will need to be addressed by Dr. Rosen.  Would recommend upper or lower GI endoscopy given the CDX 2 and the CK7 positivity for completeness sake especially since the TTF-1 is negative and yet this looks like an adenocarcinoma.  Per my discussion with Dr. Asaf Lobato, we will get him to Dr. Rosen for cardiac clearance and to determine whether he can come off any of his antiplatelet therapies, to gastroenterology for upper and lower GI endoscopy to be sure that there is no occult GI primary that has now metastasized to the lung, to Dr. Lobato's office for pulmonary function testing for adequacy of lung function, and we will have Alina Strauss to present him at multidisciplinary lung conference in the next couple of weeks once this data is available to decide on our plan of attack and ultimately to Dr. Luis Booth  for surgical resection as appropriate.  We will get his discs from outside PET and MRI brain downloaded into our system.  Discussed with patient face-to-face as well as with nurse navigator as well as with Dr. Asaf Lobato 1 hour greater than 50% spent counseling regarding this plan as outlined above.        Daniel Haro MD    8/13/2020

## 2020-08-14 ENCOUNTER — TELEPHONE (OUTPATIENT)
Dept: GASTROENTEROLOGY | Facility: CLINIC | Age: 60
End: 2020-08-14

## 2020-08-14 NOTE — TELEPHONE ENCOUNTER
Alina Strauss  Nurse Navigator called re: pt appt calendar and request a return call. Returned call to Alina, advised of pt arrival/appt time for EGD & Colonoscopy. Also confirmed COVID testing time. Understanding was voiced and no further questions at this time.

## 2020-08-14 NOTE — TELEPHONE ENCOUNTER
I spoke with Mr Morley this morning.Alishaeint agreed to call Dr Rosen's office this morning to see if he needs an appointment to get a cardiac clearance. I will also send Cardiac clearance form to Dr Rosen's office.

## 2020-08-14 NOTE — TELEPHONE ENCOUNTER
Patient is scheduled for an EGD & Colonoscopy on 8/25.  Per notes with Dr Haro yesterday, he will need to see Dr. Rosen for cardiac clearance to determine whether he can come off any of his antiplatelet therapies.

## 2020-08-17 DIAGNOSIS — Z12.11 SCREENING FOR COLON CANCER: Primary | ICD-10-CM

## 2020-08-17 RX ORDER — SODIUM, POTASSIUM,MAG SULFATES 17.5-3.13G
1 SOLUTION, RECONSTITUTED, ORAL ORAL TAKE AS DIRECTED
Qty: 2 BOTTLE | Refills: 0 | Status: ON HOLD | OUTPATIENT
Start: 2020-08-17 | End: 2020-09-23

## 2020-08-19 NOTE — TELEPHONE ENCOUNTER
I called Mr Morley to inform him that Dr Rosen approved him to stop his Plavix 7 days before his procedure but do not stop Aspirin. Patient voiced understanding.

## 2020-08-21 ENCOUNTER — APPOINTMENT (OUTPATIENT)
Dept: PET IMAGING | Facility: HOSPITAL | Age: 60
End: 2020-08-21

## 2020-08-21 ENCOUNTER — APPOINTMENT (OUTPATIENT)
Dept: MRI IMAGING | Facility: HOSPITAL | Age: 60
End: 2020-08-21

## 2020-08-23 ENCOUNTER — APPOINTMENT (OUTPATIENT)
Dept: PREADMISSION TESTING | Facility: HOSPITAL | Age: 60
End: 2020-08-23

## 2020-08-23 PROCEDURE — C9803 HOPD COVID-19 SPEC COLLECT: HCPCS

## 2020-08-23 PROCEDURE — U0004 COV-19 TEST NON-CDC HGH THRU: HCPCS

## 2020-08-23 PROCEDURE — U0002 COVID-19 LAB TEST NON-CDC: HCPCS

## 2020-08-24 LAB
REF LAB TEST METHOD: NORMAL
SARS-COV-2 RNA RESP QL NAA+PROBE: NOT DETECTED

## 2020-08-25 ENCOUNTER — OUTSIDE FACILITY SERVICE (OUTPATIENT)
Dept: GASTROENTEROLOGY | Facility: CLINIC | Age: 60
End: 2020-08-25

## 2020-08-25 PROCEDURE — 45385 COLONOSCOPY W/LESION REMOVAL: CPT | Performed by: INTERNAL MEDICINE

## 2020-08-25 PROCEDURE — 88305 TISSUE EXAM BY PATHOLOGIST: CPT | Performed by: INTERNAL MEDICINE

## 2020-08-25 PROCEDURE — 43239 EGD BIOPSY SINGLE/MULTIPLE: CPT | Performed by: INTERNAL MEDICINE

## 2020-08-26 ENCOUNTER — LAB REQUISITION (OUTPATIENT)
Dept: LAB | Facility: HOSPITAL | Age: 60
End: 2020-08-26

## 2020-08-26 ENCOUNTER — OFFICE VISIT (OUTPATIENT)
Dept: PULMONOLOGY | Facility: CLINIC | Age: 60
End: 2020-08-26

## 2020-08-26 DIAGNOSIS — R94.39 ABNORMAL RESULT OF OTHER CARDIOVASCULAR FUNCTION STUDY: ICD-10-CM

## 2020-08-26 DIAGNOSIS — R06.02 SOB (SHORTNESS OF BREATH): Primary | ICD-10-CM

## 2020-08-26 PROCEDURE — 94375 RESPIRATORY FLOW VOLUME LOOP: CPT | Performed by: INTERNAL MEDICINE

## 2020-08-26 PROCEDURE — 94729 DIFFUSING CAPACITY: CPT | Performed by: INTERNAL MEDICINE

## 2020-08-26 PROCEDURE — 94726 PLETHYSMOGRAPHY LUNG VOLUMES: CPT | Performed by: INTERNAL MEDICINE

## 2020-08-27 ENCOUNTER — NURSE NAVIGATOR (OUTPATIENT)
Dept: ONCOLOGY | Facility: CLINIC | Age: 60
End: 2020-08-27

## 2020-08-27 ENCOUNTER — OFFICE VISIT (OUTPATIENT)
Dept: ONCOLOGY | Facility: CLINIC | Age: 60
End: 2020-08-27

## 2020-08-27 VITALS
BODY MASS INDEX: 34.07 KG/M2 | HEIGHT: 70 IN | WEIGHT: 238 LBS | SYSTOLIC BLOOD PRESSURE: 144 MMHG | TEMPERATURE: 97.8 F | OXYGEN SATURATION: 94 % | DIASTOLIC BLOOD PRESSURE: 78 MMHG | RESPIRATION RATE: 18 BRPM | HEART RATE: 66 BPM

## 2020-08-27 DIAGNOSIS — C34.92 ADENOCARCINOMA OF LEFT LUNG (HCC): Chronic | ICD-10-CM

## 2020-08-27 DIAGNOSIS — R94.39 ABNORMAL STRESS TEST: Primary | Chronic | ICD-10-CM

## 2020-08-27 LAB
CYTO UR: NORMAL
LAB AP CASE REPORT: NORMAL
LAB AP CLINICAL INFORMATION: NORMAL
PATH REPORT.FINAL DX SPEC: NORMAL
PATH REPORT.GROSS SPEC: NORMAL

## 2020-08-27 PROCEDURE — 99215 OFFICE O/P EST HI 40 MIN: CPT | Performed by: INTERNAL MEDICINE

## 2020-08-27 RX ORDER — METFORMIN HYDROCHLORIDE 500 MG/1
TABLET, EXTENDED RELEASE ORAL
COMMUNITY
Start: 2020-08-20 | End: 2022-10-05

## 2020-08-27 NOTE — PROGRESS NOTES
CHIEF COMPLAINT: Adenocarcinoma of the lung    Problem List:  Oncology/Hematology History    1.  Probable adenocarcinoma of the lung versus metastasis from GI tract for tissue staining but no evidence of such on PET  2.  Hypertension  3.  Hyperlipidemia  4.  Coronary disease status post 8 stents with Dr. Rosen 2019 remains on dual antiplatelet therapy  5.  50-pack-year smoker persistent      -Summer 2020 presented to primary care with chronic cough.  -CT chest done at [facility] in [place] showed 6.5 cm left upper lobe mass with 2 noncalcified nodules in the right lung, 9 mm inferior right upper lobe, 8 mm right middle lobe, and scarring with fibrosis.  -7/31/2020 left upper lobe CT-guided lung biopsy showed adenocarcinoma with mucinous features diffuse positive for CK 7 and patchy positive for CK 20, moderately positive for CEA and CDX 2 and negative for TTF-1 and Napsin.  -8/10/2020 PET scan [facility] regional imaging shows left upper lobe mass SUV 11.3, 6.5 cm.  6 mm inferior right middle lobe nodule not PET avid.  MRI brain done at [facility] showed white matter changes but no metastasis.  -8/13/2020 [facility] medical oncology initial consultation: There is still some uncertainty as to whether this is metastatic disease to the lung with a large metastasis and other smaller non-PET avid foci versus a lung primary with non-PET avid small lung nodules that may yet be metastatic but not hypermetabolic due to their small size versus a lung primary that should be resected and the other lesions are just benign and therefore not PET avid.  With dual antiplatelet therapy and multiple coronary vessel disease there will be significant operative risk that will need to be addressed by Dr. Rosen.  Would recommend upper or lower GI endoscopy given the CDX 2 and the CK7 positivity for completeness sake especially since the TTF-1 is negative and yet this looks like an adenocarcinoma.  Per my  discussion with Dr. Asaf Lobato, we will get him to Dr. Rosen for cardiac clearance and to determine whether he can come off any of his antiplatelet therapies, to gastroenterology for upper and lower GI endoscopy to be sure that there is no occult GI primary that has now metastasized to the lung, to Dr. Lobato's office for pulmonary function testing for adequacy of lung function, and we will have Alina Strauss to present him at multidisciplinary lung conference in the next couple of weeks once this data is available to decide on our plan of attack and ultimately to Dr. Luis Booth for surgical resection as appropriate.  We will get his discs from outside PET and MRI brain downloaded into our system.      -8/25/2020 EGD and colonoscopy showed esophagitis.  Colonoscopy showed polyp with diverticulosis and hemorrhoids of the transverse and ascending colon.  -8/26/2020 pulmonary function test showed FEV1 of 2.51 L and DLCO 73% of predicted.  Patient communicated with Dr. Rosen who is just seeing him back in June who cleared him for surgery.  He can stop his Plavix 7 days before his procedures but cannot stop his aspirin.    -8/27/2020 [facility] follow-up visit: Presuming the pathology from the EGD and colonoscopy comes back benign and that all that was seen most just a polyp, and given that his pulmonary functions appear adequate, I have spoken with Dr. Steve Booth to consider resection of this and he will coordinate with Dr. Rosen the antiplatelet therapy and whether there needs to be cessation of such.  This will have less to do with surgery and more to do with postop epidural anesthesia risk and I will let Dr. Rosen and Dr. Booth negotiate that path.  I will follow-up the patient in a few weeks presumably postoperatively to discuss adjuvant therapy as indicated based on the pathological stage.        Abnormal stress test       HISTORY OF PRESENT ILLNESS:  The patient is a 60 y.o. male, here  for follow up on management of stage IIb adenocarcinoma of the lung with colonoscopy and EGD apparently showing no GI primary despite the odd staining pattern.  Clinically this fits most with adenocarcinoma of the lung as well.      Past Medical History:   Diagnosis Date   • Anticoagulated    • COPD (chronic obstructive pulmonary disease) (CMS/HCC)    • Coronary artery disease    • Fractures    • GERD (gastroesophageal reflux disease)    • Heart murmur    • Hyperlipidemia    • Hypertension    • Lung mass     lung biopsy today 7/31/2020   • Prediabetes      Past Surgical History:   Procedure Laterality Date   • AMPUTATION FINGER / THUMB Right    • APPENDECTOMY     • CARDIAC CATHETERIZATION     • CARDIAC CATHETERIZATION N/A 8/17/2018    Procedure: Left Heart Cath;  Surgeon: Janes Rosen MD;  Location: [facility] CATH INVASIVE LOCATION;  Service: Cardiovascular   • COLONOSCOPY     • CORONARY STENT PLACEMENT      x8   • TIBIA FRACTURE SURGERY Bilateral    • TRIGGER FINGER RELEASE Bilateral        No Known Allergies    Family History and Social History reviewed and changed as necessary      REVIEW OF SYSTEM:   Review of Systems   Constitutional: Negative for appetite change, chills, diaphoresis, fatigue, fever and unexpected weight change.   HENT:   Negative for mouth sores, sore throat and trouble swallowing.    Eyes: Negative for icterus.   Respiratory: Negative for cough, hemoptysis and shortness of breath.    Cardiovascular: Negative for chest pain, leg swelling and palpitations.   Gastrointestinal: Negative for abdominal distention, abdominal pain, blood in stool, constipation, diarrhea, nausea and vomiting.   Endocrine: Negative for hot flashes.   Genitourinary: Negative for bladder incontinence, difficulty urinating, dysuria, frequency and hematuria.    Musculoskeletal: Negative for gait problem, neck pain and neck stiffness.   Skin: Negative for rash.   Neurological: Negative for dizziness, gait problem,  "headaches, light-headedness and numbness.   Hematological: Negative for adenopathy. Does not bruise/bleed easily.   Psychiatric/Behavioral: Negative for depression. The patient is not nervous/anxious.    All other systems reviewed and are negative.       PHYSICAL EXAM    Vitals:    08/27/20 0826   BP: 144/78   Pulse: 66   Resp: 18   Temp: 97.8 °F (36.6 °C)   SpO2: 94%   Weight: 108 kg (238 lb)   Height: 177.8 cm (70\")     Vitals:    08/27/20 0826   PainSc: 0-No pain        Constitutional: Appears well-developed and well-nourished. No distress.   ECOG: (0) Fully Active - Able to Carry On All Pre-disease Performance Without Restriction  HENT:   Head: Normocephalic.   Mouth/Throat: Oropharynx is clear and moist.   Eyes: Conjunctivae are normal. Pupils are equal, round, and reactive to light. No scleral icterus.   Neck: Neck supple. No JVD present. No thyromegaly present.   Cardiovascular: Normal rate, regular rhythm and normal heart sounds.    Pulmonary/Chest: Breath sounds normal. No respiratory distress.   Abdominal: Soft. Exhibits no distension and no mass. There is no hepatosplenomegaly. There is no tenderness. There is no rebound and no guarding.   Musculoskeletal:Exhibits no edema, tenderness or deformity.   Neurological: Alert and oriented to person, place, and time. Exhibits normal muscle tone.   Skin: No ecchymosis, no petechiae and no rash noted. Not diaphoretic. No cyanosis. Nails show no clubbing.   Psychiatric: Normal mood and affect.   Vitals reviewed.      Lab Results   Component Value Date    HGB 16.1 08/13/2020    HCT 48.6 08/13/2020    MCV 91.1 08/13/2020     08/13/2020    WBC 9.40 08/13/2020    NEUTROABS 6.10 08/13/2020    LYMPHSABS 2.80 08/13/2020    MONOSABS 0.50 08/13/2020       Lab Results   Component Value Date    GLUCOSE 99 08/13/2020    BUN 13 08/13/2020    CREATININE 0.99 08/13/2020     08/13/2020    K 4.9 08/13/2020     08/13/2020    CO2 27.0 08/13/2020    CALCIUM 9.7 " 08/13/2020    PROTEINTOT 7.4 08/13/2020    ALBUMIN 4.40 08/13/2020    BILITOT 0.8 08/13/2020    ALKPHOS 92 08/13/2020    AST 40 08/13/2020    ALT 54 (H) 08/13/2020                   ASSESSMENT & PLAN:  1.  Stage IIb T3 N0 M0 clinical stage adenocarcinoma of the lung versus metastasis from GI tract for tissue staining but no evidence of such on PET or on colonoscopy/EGD  2.  Hypertension  3.  Hyperlipidemia  4.  Coronary disease status post 8 stents with Dr. Rosen 2019 remains on dual antiplatelet therapy  5.  50-pack-year smoker persistent      -Summer 2020 presented to primary care with chronic cough.  -CT chest done at [facility] in [place] showed 6.5 cm left upper lobe mass with 2 noncalcified nodules in the right lung, 9 mm inferior right upper lobe, 8 mm right middle lobe, and scarring with fibrosis.  -7/31/2020 left upper lobe CT-guided lung biopsy showed adenocarcinoma with mucinous features diffuse positive for CK 7 and patchy positive for CK 20, moderately positive for CEA and CDX 2 and negative for TTF-1 and Napsin.  -8/10/2020 PET scan [facility] regional imaging shows left upper lobe mass SUV 11.3, 6.5 cm.  6 mm inferior right middle lobe nodule not PET avid.  MRI brain done at [facility] showed white matter changes but no metastasis.  -8/13/2020 [facility] initial consultation: There is still some uncertainty as to whether this is metastatic disease to the lung with a large metastasis and other smaller non-PET avid foci versus a lung primary with non-PET avid small lung nodules that may yet be metastatic but not hypermetabolic due to their small size versus a lung primary that should be resected and the other lesions are just benign and therefore not PET avid.  With dual antiplatelet therapy and multiple coronary vessel disease there will be significant operative risk that will need to be addressed by Dr. Rosen.  Would recommend upper or lower GI endoscopy  given the CDX 2 and the CK7 positivity for completeness sake especially since the TTF-1 is negative and yet this looks like an adenocarcinoma.  Per my discussion with Dr. Asaf Lobato, we will get him to Dr. Rosen for cardiac clearance and to determine whether he can come off any of his antiplatelet therapies, to gastroenterology for upper and lower GI endoscopy to be sure that there is no occult GI primary that has now metastasized to the lung, to Dr. Lobato's office for pulmonary function testing for adequacy of lung function, and we will have Alina Strauss to present him at multidisciplinary lung conference in the next couple of weeks once this data is available to decide on our plan of attack and ultimately to Dr. Luis Booth for surgical resection as appropriate.  We will get his discs from outside PET and MRI brain downloaded into our system.      -8/25/2020 EGD and colonoscopy showed esophagitis.  Colonoscopy showed polyp with diverticulosis and hemorrhoids of the transverse and ascending colon.  -8/26/2020 pulmonary function test showed FEV1 of 2.51 L and DLCO 73% of predicted.  Patient communicated with Dr. Rosen who is just seeing him back in June who cleared him for surgery.  He can stop his Plavix 7 days before his procedures but cannot stop his aspirin.    -8/27/2020 [facility] oncology follow-up visit: Presuming the pathology from the EGD and colonoscopy comes back benign and that all that was seen most just a polyp, and given that his pulmonary functions appear adequate, I have spoken with Dr. Steve Booth to consider resection of this and he will coordinate with Dr. Rosen the antiplatelet therapy and whether there needs to be cessation of such.  This will have less to do with surgery and more to do with postop epidural anesthesia risk and I will let Dr. Rosen and Dr. Booth negotiate that path.  I will follow-up the patient in a few weeks presumably postoperatively to discuss adjuvant  therapy as indicated based on the pathological stage.  We will get Jose Antonio MI profile sent on his original biopsy so that we can have molecular testing to guide us if there is sufficient tissue.  If not we will send it on the resection specimen.  This is important as adjuvant therapy does not always entail chemotherapy.  As to the contralateral 2 small PET negative lung nodules, I suspect this is not metastatic but likely benign but it will need close attention on follow-up imaging postoperatively but I would give him the benefit of the doubt and will resect the obvious 6-1/2 cm lung mass if feasible.    Discussed with patient face-to-face 1 hour greater than 50% spent counseling regarding this plan as outlined above    Daniel Haro MD    08/27/2020

## 2020-08-27 NOTE — PROGRESS NOTES
Met patient and daughter in clinic with Dr. Haro today. Patient had PFT's and EGD/colonoscopy completed earlier this week. PFT's are acceptable for surgical resection, pathology still pending on GI workup. Per Dr. Haro patient needs to be seen by Dr. Rosen to evaluate for ability to discontinue dual anticoagulant therapy in preparation for thoracotomy and epidural pain pump with Dr. Booth. Patient will call Dr. Rosen and get that set up in the near future. Dr. Haro' office will refer patient to Dr. Booth for surgical evaluation. Dr. Haro' office will also investigate if there is enough lung tissue from biopsy to send CARIS testing now. Navigator will monitor GI pathology results and continue to follow patient for further needs. He knows to call with questions or concerns.

## 2020-08-27 NOTE — PROGRESS NOTES
Patient called stating he has appointment with Dr. Rosen 08/31/20 at 1015. He knows to call with questions or concerns.

## 2020-08-31 ENCOUNTER — MDT ASSESSMENT (OUTPATIENT)
Dept: ONCOLOGY | Facility: CLINIC | Age: 60
End: 2020-08-31

## 2020-09-01 ENCOUNTER — NURSE NAVIGATOR (OUTPATIENT)
Dept: ONCOLOGY | Facility: CLINIC | Age: 60
End: 2020-09-01

## 2020-09-01 ENCOUNTER — TELEPHONE (OUTPATIENT)
Dept: GASTROENTEROLOGY | Facility: CLINIC | Age: 60
End: 2020-09-01

## 2020-09-01 NOTE — PROGRESS NOTES
1. Called patient 08/31/20 to inquire about cardiology appointment. States Dr. Rosen preformed several tests and will notify him with results and decision for stopping DAPT. Also informed patient of EGD/colonoscopy results which are benign. Informed him Dr. Sarmiento's office would likely be in contact with him regarding further plan post scope. He v/u and knows to call with questions or concerns.   2. Patient presented at lung conference 09/01/20. Per that discussion patient needs to proceed with surgery followed by adjuvant chemotherapy. Notified Dr. Haro and Dr. Booth's office.

## 2020-09-01 NOTE — TELEPHONE ENCOUNTER
----- Message from Dwight Sarmiento MD sent at 8/31/2020 10:20 PM EDT -----  Let Mr. Morley know there were several adenoma polyps and he will need a repeat exam in 3 years.  Thank you,  ASIYA

## 2020-09-14 ENCOUNTER — OFFICE VISIT (OUTPATIENT)
Dept: CARDIAC SURGERY | Facility: CLINIC | Age: 60
End: 2020-09-14

## 2020-09-14 VITALS
DIASTOLIC BLOOD PRESSURE: 85 MMHG | WEIGHT: 242.8 LBS | HEIGHT: 70 IN | BODY MASS INDEX: 34.76 KG/M2 | TEMPERATURE: 97.8 F | OXYGEN SATURATION: 97 % | SYSTOLIC BLOOD PRESSURE: 152 MMHG | HEART RATE: 66 BPM

## 2020-09-14 DIAGNOSIS — C34.12 MALIGNANT NEOPLASM OF UPPER LOBE OF LEFT LUNG (HCC): Primary | ICD-10-CM

## 2020-09-14 PROCEDURE — 99205 OFFICE O/P NEW HI 60 MIN: CPT | Performed by: THORACIC SURGERY (CARDIOTHORACIC VASCULAR SURGERY)

## 2020-09-14 NOTE — PROGRESS NOTES
09/14/2020  Patient Information  Eliazar Morley                                                                                          [address] [place] [address]   1960  'PCP/Referring Physician'  Siddharth Godwin MD  [phone]  Daniel Haro MD  [phone]  Chief Complaint   Patient presents with   • Lung Cancer     Referred by Dr. Haro for left lung cancer.        History of Present Illness:   The patient was referred to me to evaluate for surgery for left-sided lung cancer.  This patient has a 50-pack/year history of smoking.  He underwent a CT scan by his family physician, demonstrating a left-sided lung mass.  A subsequent biopsy demonstrated the presence of adenocarcinoma, but I have yet to determine and find the exact pathology report to confirm that. A subsequent work-up by oncology services, pulmonary services and GI medicine services, believe this is an isolated lung mass primary lung cancer and not metastatic disease. A pulmonary function test was adequate for surgery and he was referred to me to evaluate for surgical intervention.  A MRI scan of the brain demonstrated no metastasis and PET scan demonstrated no evidence of hypermetabolic uptake elsewhere. The patient denies any weight loss, cough, or hemoptysis.      Patient Active Problem List   Diagnosis   • Abnormal stress test   • Adenocarcinoma of left lung (CMS/HCC)   • Malignant neoplasm of upper lobe of left lung (CMS/HCC)     Past Medical History:   Diagnosis Date   • Anticoagulated    • Coronary artery disease    • Fractures    • GERD (gastroesophageal reflux disease)    • Heart murmur    • Hyperlipidemia    • Hypertension    • Lung mass     lung biopsy today 7/31/2020   • Prediabetes      Past Surgical History:   Procedure Laterality Date   • AMPUTATION FINGER / THUMB Right    • APPENDECTOMY     • CARDIAC CATHETERIZATION     • CARDIAC CATHETERIZATION N/A 8/17/2018    Procedure: Left Heart Cath;  Surgeon: Jessika  Janes Ag MD;  Location: [facility] INVASIVE LOCATION;  Service: Cardiovascular   • COLONOSCOPY     • CORONARY STENT PLACEMENT      x8   • TIBIA FRACTURE SURGERY Bilateral    • TRIGGER FINGER RELEASE Bilateral        Current Outpatient Medications:   •  Alirocumab (Praluent) 150 MG/ML solution prefilled syringe, Inject  under the skin into the appropriate area as directed., Disp: , Rfl:   •  aspirin 81 MG EC tablet, Take 81 mg by mouth Daily., Disp: , Rfl:   •  carvedilol (COREG) 6.25 MG tablet, Take 12.5 mg by mouth 2 (Two) Times a Day With Meals., Disp: , Rfl:   •  Cholecalciferol (VITAMIN D3) 50 MCG (2000 UT) tablet, Take  by mouth Daily., Disp: , Rfl:   •  clopidogrel (PLAVIX) 75 MG tablet, Take 1 tablet by mouth Daily., Disp: 30 tablet, Rfl: 11  •  irbesartan (AVAPRO) 150 MG tablet, Take 150 mg by mouth Daily., Disp: , Rfl:   •  metFORMIN ER (GLUCOPHAGE-XR) 500 MG 24 hr tablet, TK 1 T PO QD AFTER SUPPER, Disp: , Rfl:   •  pantoprazole (PROTONIX) 40 MG EC tablet, Take 40 mg by mouth Daily., Disp: , Rfl:   •  varenicline (CHANTIX) 1 MG tablet, Take 1 mg by mouth 2 (Two) Times a Day., Disp: , Rfl:   •  vitamin B-12 (CYANOCOBALAMIN) 500 MCG tablet, Take 500 mcg by mouth Daily., Disp: , Rfl:   •  Zinc 30 MG tablet, Take  by mouth Daily., Disp: , Rfl:   •  atorvastatin (LIPITOR) 40 MG tablet, Take 40 mg by mouth Daily., Disp: , Rfl:   •  meloxicam (MOBIC) 15 MG tablet, Take 15 mg by mouth Daily., Disp: , Rfl:   •  omeprazole (priLOSEC) 20 MG capsule, Take 20 mg by mouth Daily., Disp: , Rfl:   •  sodium-potassium-magnesium sulfates (Suprep Bowel Prep Kit) 17.5-3.13-1.6 GM/177ML solution oral solution, Take 1 bottle by mouth Take As Directed. Follow instructions that were mailed to your home. If you didn't receive these call [phone]., Disp: 2 bottle, Rfl: 0  •  tiotropium bromide-olodaterol (Stiolto Respimat) 2.5-2.5 MCG/ACT aerosol solution inhaler, Inhale Daily., Disp: , Rfl:   No Known Allergies  Social  History     Socioeconomic History   • Marital status:      Spouse name: Not on file   • Number of children: 1   • Years of education: Not on file   • Highest education level: Not on file   Occupational History   • Occupation: [employer]   Tobacco Use   • Smoking status: Former Smoker     Packs/day: 1.00     Years: 45.00     Pack years: 45.00     Types: Cigarettes     Quit date: 2020     Years since quittin.2   • Smokeless tobacco: Never Used   Substance and Sexual Activity   • Alcohol use: Yes     Alcohol/week: 6.0 standard drinks     Types: 6 Cans of beer per week     Comment: weekly   • Drug use: No   • Sexual activity: Defer   Social History Narrative    L[facility] in [facility]      Family History   Problem Relation Age of Onset   • Emphysema Mother    • Alzheimer's disease Father      Review of Systems   Constitution: Negative for chills, fever, malaise/fatigue, night sweats and weight loss.   HENT: Negative for hearing loss, odynophagia and sore throat.    Cardiovascular: Positive for dyspnea on exertion. Negative for chest pain, leg swelling, orthopnea and palpitations.   Respiratory: Positive for hemoptysis (3 days ago- small amount ). Negative for cough.    Endocrine: Negative for cold intolerance, heat intolerance, polydipsia, polyphagia and polyuria.   Hematologic/Lymphatic: Does not bruise/bleed easily.   Skin: Negative for itching and rash.   Musculoskeletal: Negative for joint pain, joint swelling and myalgias.   Gastrointestinal: Negative for abdominal pain, constipation, diarrhea, hematemesis, hematochezia, melena, nausea and vomiting.   Genitourinary: Negative for dysuria, frequency and hematuria.   Neurological: Negative for focal weakness, headaches, numbness and seizures.   Psychiatric/Behavioral: Negative for depression and suicidal ideas. The patient is not nervous/anxious.    All other systems reviewed and are negative.    Vitals:    20 0741   BP: 152/85   BP Location:  "Right arm   Patient Position: Sitting   Pulse: 66   Temp: 97.8 °F (36.6 °C)   SpO2: 97%   Weight: 110 kg (242 lb 12.8 oz)   Height: 177.8 cm (70\")      Physical Exam   CONSTITUTIONAL: Alert and conversant, Well dressed, Well nourished, No acute distress  EYES: Sclera clean, Anicteric, Pupils equal  ENT: No nasal deviation, Trachea midline  NECK: No neck masses, Supple  LUNGS: No wheezing, Cough, non-congested  HEART: No rubs, No murmurs  GASTROINTESTINAL: Soft, non-distended, No masses, Non tender  to palpation, normal bowel sounds  NEURO: No motor deficits, No sensory deficits, Cranial Nerves 2 through 12 grossly intact  PSYCHIATRIC: Oriented to person, place and time, No memory deficits, Mood appropriate  VASCULAR: No carotid bruits, Femoral pulses palpable and symmetric  MUSKULOSKELETAL: No extremity trauma or extremity asymmetry    The past medical history, surgical history, family history, social history, review of systems and vitals were reviewed by myself and corrected as needed.      Labs/Imaging:   I discussed this case with Dr. Daniel Haro on 2 occasions. I have reviewed the PET scan, MRI scan, and I reviewed the CT scan images, demonstrating a left-sided lung cancer. I have also reviewed the pulmonary function test.    Assessment/Plan:   The patient is being referred for left-sided lung cancer. A pulmonary function test indicated he is adequate to undergo a resection.  I talked about surgery and the potential incisions involved.  He would prefer an epidural catheter if possible and Dr. Rosen has indicated he can discontinue his Plavix for 7 days prior to his epidural.  The patient understands that there is always some risks involved in this.  I discussed the possibility of a prolonged air leak.  And absolutely no guarantees were made as to the chance of a cure.  He is aware that surgery has risks of bleeding, infection and death and is agreeable to proceed.    Patient Active Problem List   Diagnosis   • " Abnormal stress test   • Adenocarcinoma of left lung (CMS/HCC)   • Malignant neoplasm of upper lobe of left lung (CMS/HCC)       CC: MD Daniel Barraza MD Regina Fugate editing for Luis Booth MD    I, Luis Booth MD, have read and agree with the editing done by Peggy Jacobson, .

## 2020-09-17 ENCOUNTER — NURSE NAVIGATOR (OUTPATIENT)
Dept: ONCOLOGY | Facility: CLINIC | Age: 60
End: 2020-09-17

## 2020-09-17 NOTE — PROGRESS NOTES
Patient called concerned as he received letter from insurance that part of his treatment plan had been denied. Patient emailed letter to navigator for review. Denial from [employer] is regarding CARIS testing. Notified Dr. Haro' office. Informed patient [facility] will make appeals and hopefully this will be approved but assured him this would not affect his surgery or prevent him from getting care. He v/u. He knows to call with questions or concerns.

## 2020-09-18 ENCOUNTER — PREP FOR SURGERY (OUTPATIENT)
Dept: OTHER | Facility: HOSPITAL | Age: 60
End: 2020-09-18

## 2020-09-18 DIAGNOSIS — C34.90 MALIGNANT NEOPLASM OF LUNG, UNSPECIFIED LATERALITY, UNSPECIFIED PART OF LUNG (HCC): Primary | ICD-10-CM

## 2020-09-18 RX ORDER — CHLORHEXIDINE GLUCONATE 500 MG/1
1 CLOTH TOPICAL EVERY 12 HOURS PRN
Status: CANCELLED | OUTPATIENT
Start: 2020-09-18

## 2020-09-19 LAB
CYTO UR: NORMAL
LAB AP CARIS, ADDENDUM: NORMAL
LAB AP CASE REPORT: NORMAL
LAB AP CLINICAL INFORMATION: NORMAL
LAB AP DIAGNOSIS COMMENT: NORMAL
LAB AP SPECIAL STAINS: NORMAL
PATH REPORT.FINAL DX SPEC: NORMAL
PATH REPORT.GROSS SPEC: NORMAL

## 2020-09-22 ENCOUNTER — HOSPITAL ENCOUNTER (OUTPATIENT)
Dept: GENERAL RADIOLOGY | Facility: HOSPITAL | Age: 60
Discharge: HOME | End: 2020-09-22

## 2020-09-22 ENCOUNTER — ANESTHESIA EVENT (OUTPATIENT)
Dept: PERIOP | Facility: HOSPITAL | Age: 60
End: 2020-09-22

## 2020-09-22 ENCOUNTER — APPOINTMENT (OUTPATIENT)
Dept: PREADMISSION TESTING | Facility: HOSPITAL | Age: 60
End: 2020-09-22

## 2020-09-22 VITALS — HEART RATE: 60 BPM | OXYGEN SATURATION: 96 % | WEIGHT: 244.2 LBS | BODY MASS INDEX: 34.96 KG/M2 | HEIGHT: 70 IN

## 2020-09-22 DIAGNOSIS — C34.90 MALIGNANT NEOPLASM OF LUNG, UNSPECIFIED LATERALITY, UNSPECIFIED PART OF LUNG (HCC): ICD-10-CM

## 2020-09-22 LAB
ABO GROUP BLD: NORMAL
ALBUMIN SERPL-MCNC: 4.4 G/DL (ref 3.5–5.2)
ALP SERPL-CCNC: 106 U/L (ref 39–117)
ALT SERPL W P-5'-P-CCNC: 65 U/L (ref 1–41)
ANION GAP SERPL CALCULATED.3IONS-SCNC: 10 MMOL/L (ref 5–15)
AST SERPL-CCNC: 52 U/L (ref 1–40)
BASOPHILS # BLD AUTO: 0.06 10*3/MM3 (ref 0–0.2)
BASOPHILS NFR BLD AUTO: 0.8 % (ref 0–1.5)
BILIRUB CONJ SERPL-MCNC: <0.2 MG/DL (ref 0–0.3)
BILIRUB INDIRECT SERPL-MCNC: ABNORMAL MG/DL
BILIRUB SERPL-MCNC: 0.5 MG/DL (ref 0–1.2)
BLD GP AB SCN SERPL QL: NEGATIVE
BUN SERPL-MCNC: 14 MG/DL (ref 8–23)
BUN/CREAT SERPL: 16.9 (ref 7–25)
CALCIUM SPEC-SCNC: 9.5 MG/DL (ref 8.6–10.5)
CHLORIDE SERPL-SCNC: 106 MMOL/L (ref 98–107)
CO2 SERPL-SCNC: 27 MMOL/L (ref 22–29)
CREAT SERPL-MCNC: 0.83 MG/DL (ref 0.76–1.27)
DEPRECATED RDW RBC AUTO: 45.3 FL (ref 37–54)
EOSINOPHIL # BLD AUTO: 0.16 10*3/MM3 (ref 0–0.4)
EOSINOPHIL NFR BLD AUTO: 2.1 % (ref 0.3–6.2)
ERYTHROCYTE [DISTWIDTH] IN BLOOD BY AUTOMATED COUNT: 13 % (ref 12.3–15.4)
GFR SERPL CREATININE-BSD FRML MDRD: 94 ML/MIN/1.73
GLUCOSE SERPL-MCNC: 144 MG/DL (ref 65–99)
HBA1C MFR BLD: 6.2 % (ref 4.8–5.6)
HCT VFR BLD AUTO: 48.1 % (ref 37.5–51)
HGB BLD-MCNC: 15.5 G/DL (ref 13–17.7)
IMM GRANULOCYTES # BLD AUTO: 0.04 10*3/MM3 (ref 0–0.05)
IMM GRANULOCYTES NFR BLD AUTO: 0.5 % (ref 0–0.5)
INR PPP: 0.98 (ref 0.85–1.16)
LYMPHOCYTES # BLD AUTO: 1.97 10*3/MM3 (ref 0.7–3.1)
LYMPHOCYTES NFR BLD AUTO: 25.3 % (ref 19.6–45.3)
MCH RBC QN AUTO: 30.5 PG (ref 26.6–33)
MCHC RBC AUTO-ENTMCNC: 32.2 G/DL (ref 31.5–35.7)
MCV RBC AUTO: 94.7 FL (ref 79–97)
MONOCYTES # BLD AUTO: 0.81 10*3/MM3 (ref 0.1–0.9)
MONOCYTES NFR BLD AUTO: 10.4 % (ref 5–12)
NEUTROPHILS NFR BLD AUTO: 4.75 10*3/MM3 (ref 1.7–7)
NEUTROPHILS NFR BLD AUTO: 60.9 % (ref 42.7–76)
NRBC BLD AUTO-RTO: 0 /100 WBC (ref 0–0.2)
PLATELET # BLD AUTO: 245 10*3/MM3 (ref 140–450)
PMV BLD AUTO: 11.5 FL (ref 6–12)
POTASSIUM SERPL-SCNC: 4.8 MMOL/L (ref 3.5–5.2)
PROT SERPL-MCNC: 6.8 G/DL (ref 6–8.5)
PROTHROMBIN TIME: 12.7 SECONDS (ref 11.5–14)
RBC # BLD AUTO: 5.08 10*6/MM3 (ref 4.14–5.8)
RH BLD: POSITIVE
SARS-COV-2 RNA RESP QL NAA+PROBE: NOT DETECTED
SODIUM SERPL-SCNC: 143 MMOL/L (ref 136–145)
T&S EXPIRATION DATE: NORMAL
WBC # BLD AUTO: 7.79 10*3/MM3 (ref 3.4–10.8)

## 2020-09-22 PROCEDURE — 87635 SARS-COV-2 COVID-19 AMP PRB: CPT | Performed by: THORACIC SURGERY (CARDIOTHORACIC VASCULAR SURGERY)

## 2020-09-22 PROCEDURE — C9803 HOPD COVID-19 SPEC COLLECT: HCPCS

## 2020-09-22 PROCEDURE — 93010 ELECTROCARDIOGRAM REPORT: CPT | Performed by: INTERNAL MEDICINE

## 2020-09-22 PROCEDURE — 85025 COMPLETE CBC W/AUTO DIFF WBC: CPT | Performed by: PHYSICIAN ASSISTANT

## 2020-09-22 PROCEDURE — 86923 COMPATIBILITY TEST ELECTRIC: CPT

## 2020-09-22 PROCEDURE — 71046 X-RAY EXAM CHEST 2 VIEWS: CPT

## 2020-09-22 PROCEDURE — 86901 BLOOD TYPING SEROLOGIC RH(D): CPT | Performed by: PHYSICIAN ASSISTANT

## 2020-09-22 PROCEDURE — 80076 HEPATIC FUNCTION PANEL: CPT | Performed by: PHYSICIAN ASSISTANT

## 2020-09-22 PROCEDURE — 36415 COLL VENOUS BLD VENIPUNCTURE: CPT

## 2020-09-22 PROCEDURE — 86900 BLOOD TYPING SEROLOGIC ABO: CPT | Performed by: PHYSICIAN ASSISTANT

## 2020-09-22 PROCEDURE — 80048 BASIC METABOLIC PNL TOTAL CA: CPT | Performed by: PHYSICIAN ASSISTANT

## 2020-09-22 PROCEDURE — 85610 PROTHROMBIN TIME: CPT | Performed by: PHYSICIAN ASSISTANT

## 2020-09-22 PROCEDURE — 86850 RBC ANTIBODY SCREEN: CPT | Performed by: PHYSICIAN ASSISTANT

## 2020-09-22 PROCEDURE — 83036 HEMOGLOBIN GLYCOSYLATED A1C: CPT | Performed by: PHYSICIAN ASSISTANT

## 2020-09-22 PROCEDURE — 93005 ELECTROCARDIOGRAM TRACING: CPT

## 2020-09-23 ENCOUNTER — ANESTHESIA (OUTPATIENT)
Dept: PERIOP | Facility: HOSPITAL | Age: 60
End: 2020-09-23

## 2020-09-23 ENCOUNTER — APPOINTMENT (OUTPATIENT)
Dept: GENERAL RADIOLOGY | Facility: HOSPITAL | Age: 60
End: 2020-09-23

## 2020-09-23 ENCOUNTER — HOSPITAL ENCOUNTER (INPATIENT)
Facility: HOSPITAL | Age: 60
LOS: 8 days | Discharge: HOME | End: 2020-10-01
Attending: THORACIC SURGERY (CARDIOTHORACIC VASCULAR SURGERY) | Admitting: THORACIC SURGERY (CARDIOTHORACIC VASCULAR SURGERY)

## 2020-09-23 DIAGNOSIS — C34.12 MALIGNANT NEOPLASM OF UPPER LOBE OF LEFT LUNG (HCC): Primary | ICD-10-CM

## 2020-09-23 DIAGNOSIS — C34.90 MALIGNANT NEOPLASM OF LUNG, UNSPECIFIED LATERALITY, UNSPECIFIED PART OF LUNG (HCC): ICD-10-CM

## 2020-09-23 DIAGNOSIS — R94.39 ABNORMAL STRESS TEST: Chronic | ICD-10-CM

## 2020-09-23 DIAGNOSIS — C34.12 MALIGNANT NEOPLASM OF UPPER LOBE OF LEFT LUNG (HCC): ICD-10-CM

## 2020-09-23 DIAGNOSIS — Z98.890 S/P THORACOTOMY: ICD-10-CM

## 2020-09-23 LAB
ABO GROUP BLD: NORMAL
GLUCOSE BLDC GLUCOMTR-MCNC: 136 MG/DL (ref 70–130)
GLUCOSE BLDC GLUCOMTR-MCNC: 142 MG/DL (ref 70–130)
GLUCOSE BLDC GLUCOMTR-MCNC: 152 MG/DL (ref 70–130)
RH BLD: POSITIVE

## 2020-09-23 PROCEDURE — 25010000002 CEFUROXIME: Performed by: PHYSICIAN ASSISTANT

## 2020-09-23 PROCEDURE — C2615 SEALANT, PULMONARY, LIQUID: HCPCS | Performed by: THORACIC SURGERY (CARDIOTHORACIC VASCULAR SURGERY)

## 2020-09-23 PROCEDURE — 88332 PATH CONSLTJ SURG EA ADD BLK: CPT | Performed by: PATHOLOGY

## 2020-09-23 PROCEDURE — 0BJ08ZZ INSPECTION OF TRACHEOBRONCHIAL TREE, VIA NATURAL OR ARTIFICIAL OPENING ENDOSCOPIC: ICD-10-PCS | Performed by: THORACIC SURGERY (CARDIOTHORACIC VASCULAR SURGERY)

## 2020-09-23 PROCEDURE — 86900 BLOOD TYPING SEROLOGIC ABO: CPT

## 2020-09-23 PROCEDURE — 07B70ZX EXCISION OF THORAX LYMPHATIC, OPEN APPROACH, DIAGNOSTIC: ICD-10-PCS | Performed by: THORACIC SURGERY (CARDIOTHORACIC VASCULAR SURGERY)

## 2020-09-23 PROCEDURE — 25010000002 NEOSTIGMINE 10 MG/10ML SOLUTION: Performed by: NURSE ANESTHETIST, CERTIFIED REGISTERED

## 2020-09-23 PROCEDURE — 31622 DX BRONCHOSCOPE/WASH: CPT | Performed by: THORACIC SURGERY (CARDIOTHORACIC VASCULAR SURGERY)

## 2020-09-23 PROCEDURE — 25010000002 HYDROMORPHONE PER 4 MG: Performed by: NURSE ANESTHETIST, CERTIFIED REGISTERED

## 2020-09-23 PROCEDURE — 25010000002 ONDANSETRON PER 1 MG: Performed by: NURSE ANESTHETIST, CERTIFIED REGISTERED

## 2020-09-23 PROCEDURE — 0BTG0ZZ RESECTION OF LEFT UPPER LUNG LOBE, OPEN APPROACH: ICD-10-PCS | Performed by: THORACIC SURGERY (CARDIOTHORACIC VASCULAR SURGERY)

## 2020-09-23 PROCEDURE — 32480 PARTIAL REMOVAL OF LUNG: CPT | Performed by: PHYSICIAN ASSISTANT

## 2020-09-23 PROCEDURE — 32480 PARTIAL REMOVAL OF LUNG: CPT | Performed by: THORACIC SURGERY (CARDIOTHORACIC VASCULAR SURGERY)

## 2020-09-23 PROCEDURE — 25010000003 LIDOCAINE 1 % SOLUTION: Performed by: NURSE ANESTHETIST, CERTIFIED REGISTERED

## 2020-09-23 PROCEDURE — 71045 X-RAY EXAM CHEST 1 VIEW: CPT

## 2020-09-23 PROCEDURE — 99232 SBSQ HOSP IP/OBS MODERATE 35: CPT | Performed by: INTERNAL MEDICINE

## 2020-09-23 PROCEDURE — 88331 PATH CONSLTJ SURG 1 BLK 1SPC: CPT | Performed by: PATHOLOGY

## 2020-09-23 PROCEDURE — 25010000002 FENTANYL CITRATE (PF) 100 MCG/2ML SOLUTION: Performed by: NURSE ANESTHETIST, CERTIFIED REGISTERED

## 2020-09-23 PROCEDURE — 25010000002 DEXAMETHASONE PER 1 MG: Performed by: NURSE ANESTHETIST, CERTIFIED REGISTERED

## 2020-09-23 PROCEDURE — 88305 TISSUE EXAM BY PATHOLOGIST: CPT | Performed by: THORACIC SURGERY (CARDIOTHORACIC VASCULAR SURGERY)

## 2020-09-23 PROCEDURE — 82962 GLUCOSE BLOOD TEST: CPT

## 2020-09-23 PROCEDURE — 86901 BLOOD TYPING SEROLOGIC RH(D): CPT

## 2020-09-23 PROCEDURE — 88309 TISSUE EXAM BY PATHOLOGIST: CPT | Performed by: THORACIC SURGERY (CARDIOTHORACIC VASCULAR SURGERY)

## 2020-09-23 PROCEDURE — 38746 REMOVE THORACIC LYMPH NODES: CPT | Performed by: THORACIC SURGERY (CARDIOTHORACIC VASCULAR SURGERY)

## 2020-09-23 PROCEDURE — C1755 CATHETER, INTRASPINAL: HCPCS | Performed by: ANESTHESIOLOGY

## 2020-09-23 PROCEDURE — 25010000002 MORPHINE PER 10 MG: Performed by: ANESTHESIOLOGY

## 2020-09-23 PROCEDURE — 25010000003 CEFUROXIME SODIUM 1.5 G RECONSTITUTED SOLUTION: Performed by: PHYSICIAN ASSISTANT

## 2020-09-23 PROCEDURE — 25010000002 PROPOFOL 10 MG/ML EMULSION: Performed by: NURSE ANESTHETIST, CERTIFIED REGISTERED

## 2020-09-23 PROCEDURE — 25010000003 MORPHINE PER 10 MG: Performed by: NURSE ANESTHETIST, CERTIFIED REGISTERED

## 2020-09-23 DEVICE — SEALANT PLURAL AIRLEAK PROGEL W/APPL 4ML: Type: IMPLANTABLE DEVICE | Site: LUNG | Status: FUNCTIONAL

## 2020-09-23 DEVICE — ENDOPATH ECHELON ENDOSCOPIC LINEAR CUTTER RELOADS, WHITE, 60MM
Type: IMPLANTABLE DEVICE | Status: FUNCTIONAL
Brand: ECHELON ENDOPATH

## 2020-09-23 DEVICE — ENDOPATH ECHELON VASCULAR  RELOADS, WHITE, 35MM
Type: IMPLANTABLE DEVICE | Status: FUNCTIONAL
Brand: ECHELON ENDOPATH

## 2020-09-23 DEVICE — STAPLER WITH DST SERIES TECHNOLOGY
Type: IMPLANTABLE DEVICE | Site: LUNG | Status: FUNCTIONAL
Brand: TA

## 2020-09-23 RX ORDER — MORPHINE SULFATE 0.5 MG/ML
INJECTION, SOLUTION EPIDURAL; INTRATHECAL; INTRAVENOUS AS NEEDED
Status: DISCONTINUED | OUTPATIENT
Start: 2020-09-23 | End: 2020-09-23 | Stop reason: SURG

## 2020-09-23 RX ORDER — NALOXONE HCL 0.4 MG/ML
0.4 VIAL (ML) INJECTION
Status: DISCONTINUED | OUTPATIENT
Start: 2020-09-23 | End: 2020-10-01 | Stop reason: HOSPADM

## 2020-09-23 RX ORDER — SODIUM CHLORIDE 0.9 % (FLUSH) 0.9 %
10 SYRINGE (ML) INJECTION EVERY 12 HOURS SCHEDULED
Status: DISCONTINUED | OUTPATIENT
Start: 2020-09-23 | End: 2020-09-23 | Stop reason: HOSPADM

## 2020-09-23 RX ORDER — NICOTINE POLACRILEX 4 MG
15 LOZENGE BUCCAL
Status: DISCONTINUED | OUTPATIENT
Start: 2020-09-23 | End: 2020-10-01 | Stop reason: HOSPADM

## 2020-09-23 RX ORDER — FENTANYL CITRATE 50 UG/ML
50 INJECTION, SOLUTION INTRAMUSCULAR; INTRAVENOUS
Status: DISCONTINUED | OUTPATIENT
Start: 2020-09-23 | End: 2020-09-23 | Stop reason: HOSPADM

## 2020-09-23 RX ORDER — GLYCOPYRROLATE 0.2 MG/ML
INJECTION INTRAMUSCULAR; INTRAVENOUS AS NEEDED
Status: DISCONTINUED | OUTPATIENT
Start: 2020-09-23 | End: 2020-09-23 | Stop reason: SURG

## 2020-09-23 RX ORDER — BUPIVACAINE HYDROCHLORIDE 2.5 MG/ML
INJECTION, SOLUTION EPIDURAL; INFILTRATION; INTRACAUDAL AS NEEDED
Status: DISCONTINUED | OUTPATIENT
Start: 2020-09-23 | End: 2020-09-23 | Stop reason: SURG

## 2020-09-23 RX ORDER — FAMOTIDINE 20 MG/1
20 TABLET, FILM COATED ORAL ONCE
Status: COMPLETED | OUTPATIENT
Start: 2020-09-23 | End: 2020-09-23

## 2020-09-23 RX ORDER — ASPIRIN 81 MG/1
81 TABLET ORAL DAILY
Status: DISCONTINUED | OUTPATIENT
Start: 2020-09-24 | End: 2020-10-01 | Stop reason: HOSPADM

## 2020-09-23 RX ORDER — SODIUM CHLORIDE 0.9 % (FLUSH) 0.9 %
10 SYRINGE (ML) INJECTION AS NEEDED
Status: DISCONTINUED | OUTPATIENT
Start: 2020-09-23 | End: 2020-09-23 | Stop reason: HOSPADM

## 2020-09-23 RX ORDER — FAMOTIDINE 10 MG/ML
20 INJECTION, SOLUTION INTRAVENOUS ONCE
Status: DISCONTINUED | OUTPATIENT
Start: 2020-09-23 | End: 2020-09-23 | Stop reason: HOSPADM

## 2020-09-23 RX ORDER — PROPOFOL 10 MG/ML
VIAL (ML) INTRAVENOUS AS NEEDED
Status: DISCONTINUED | OUTPATIENT
Start: 2020-09-23 | End: 2020-09-23 | Stop reason: SURG

## 2020-09-23 RX ORDER — NEOSTIGMINE METHYLSULFATE 1 MG/ML
INJECTION, SOLUTION INTRAVENOUS AS NEEDED
Status: DISCONTINUED | OUTPATIENT
Start: 2020-09-23 | End: 2020-09-23 | Stop reason: SURG

## 2020-09-23 RX ORDER — ONDANSETRON 2 MG/ML
INJECTION INTRAMUSCULAR; INTRAVENOUS AS NEEDED
Status: DISCONTINUED | OUTPATIENT
Start: 2020-09-23 | End: 2020-09-23 | Stop reason: SURG

## 2020-09-23 RX ORDER — CARVEDILOL 12.5 MG/1
12.5 TABLET ORAL 2 TIMES DAILY WITH MEALS
Status: DISCONTINUED | OUTPATIENT
Start: 2020-09-23 | End: 2020-09-26

## 2020-09-23 RX ORDER — ONDANSETRON 4 MG/1
4 TABLET, FILM COATED ORAL EVERY 6 HOURS PRN
Status: DISCONTINUED | OUTPATIENT
Start: 2020-09-23 | End: 2020-10-01 | Stop reason: HOSPADM

## 2020-09-23 RX ORDER — LIDOCAINE HYDROCHLORIDE 10 MG/ML
0.5 INJECTION, SOLUTION EPIDURAL; INFILTRATION; INTRACAUDAL; PERINEURAL ONCE AS NEEDED
Status: COMPLETED | OUTPATIENT
Start: 2020-09-23 | End: 2020-09-23

## 2020-09-23 RX ORDER — DEXTROSE MONOHYDRATE 25 G/50ML
25 INJECTION, SOLUTION INTRAVENOUS
Status: DISCONTINUED | OUTPATIENT
Start: 2020-09-23 | End: 2020-10-01 | Stop reason: HOSPADM

## 2020-09-23 RX ORDER — PROMETHAZINE HYDROCHLORIDE 25 MG/1
25 TABLET ORAL ONCE AS NEEDED
Status: DISCONTINUED | OUTPATIENT
Start: 2020-09-23 | End: 2020-09-23 | Stop reason: HOSPADM

## 2020-09-23 RX ORDER — ACETAMINOPHEN 325 MG/1
650 TABLET ORAL EVERY 4 HOURS PRN
Status: DISCONTINUED | OUTPATIENT
Start: 2020-09-23 | End: 2020-10-01 | Stop reason: HOSPADM

## 2020-09-23 RX ORDER — SODIUM CHLORIDE, SODIUM LACTATE, POTASSIUM CHLORIDE, CALCIUM CHLORIDE 600; 310; 30; 20 MG/100ML; MG/100ML; MG/100ML; MG/100ML
9 INJECTION, SOLUTION INTRAVENOUS CONTINUOUS
Status: DISCONTINUED | OUTPATIENT
Start: 2020-09-23 | End: 2020-09-24

## 2020-09-23 RX ORDER — LOSARTAN POTASSIUM 50 MG/1
50 TABLET ORAL
Status: DISCONTINUED | OUTPATIENT
Start: 2020-09-23 | End: 2020-09-26

## 2020-09-23 RX ORDER — SODIUM CHLORIDE 9 MG/ML
30 INJECTION, SOLUTION INTRAVENOUS CONTINUOUS
Status: DISCONTINUED | OUTPATIENT
Start: 2020-09-23 | End: 2020-09-29

## 2020-09-23 RX ORDER — CHLORHEXIDINE GLUCONATE 500 MG/1
1 CLOTH TOPICAL EVERY 12 HOURS PRN
Status: DISCONTINUED | OUTPATIENT
Start: 2020-09-23 | End: 2020-09-23 | Stop reason: HOSPADM

## 2020-09-23 RX ORDER — ULTRASOUND COUPLING MEDIUM
GEL (GRAM) TOPICAL AS NEEDED
Status: DISCONTINUED | OUTPATIENT
Start: 2020-09-23 | End: 2020-09-23 | Stop reason: HOSPADM

## 2020-09-23 RX ORDER — DEXAMETHASONE SODIUM PHOSPHATE 4 MG/ML
INJECTION, SOLUTION INTRA-ARTICULAR; INTRALESIONAL; INTRAMUSCULAR; INTRAVENOUS; SOFT TISSUE AS NEEDED
Status: DISCONTINUED | OUTPATIENT
Start: 2020-09-23 | End: 2020-09-23 | Stop reason: SURG

## 2020-09-23 RX ORDER — NALBUPHINE HCL 10 MG/ML
10 AMPUL (ML) INJECTION
Status: DISCONTINUED | OUTPATIENT
Start: 2020-09-23 | End: 2020-09-27

## 2020-09-23 RX ORDER — HYDROCODONE BITARTRATE AND ACETAMINOPHEN 7.5; 325 MG/1; MG/1
1 TABLET ORAL EVERY 4 HOURS PRN
Status: DISCONTINUED | OUTPATIENT
Start: 2020-09-23 | End: 2020-09-30

## 2020-09-23 RX ORDER — PANTOPRAZOLE SODIUM 40 MG/1
40 TABLET, DELAYED RELEASE ORAL DAILY
Status: DISCONTINUED | OUTPATIENT
Start: 2020-09-24 | End: 2020-10-01 | Stop reason: HOSPADM

## 2020-09-23 RX ORDER — CLOPIDOGREL BISULFATE 75 MG/1
75 TABLET ORAL DAILY
Status: DISCONTINUED | OUTPATIENT
Start: 2020-09-24 | End: 2020-09-23

## 2020-09-23 RX ORDER — HYDROMORPHONE HYDROCHLORIDE 1 MG/ML
0.5 INJECTION, SOLUTION INTRAMUSCULAR; INTRAVENOUS; SUBCUTANEOUS
Status: DISCONTINUED | OUTPATIENT
Start: 2020-09-23 | End: 2020-09-23 | Stop reason: HOSPADM

## 2020-09-23 RX ORDER — MORPHINE SULFATE 4 MG/ML
2 INJECTION, SOLUTION INTRAMUSCULAR; INTRAVENOUS EVERY 4 HOURS PRN
Status: DISCONTINUED | OUTPATIENT
Start: 2020-09-23 | End: 2020-10-01 | Stop reason: HOSPADM

## 2020-09-23 RX ORDER — LIDOCAINE HYDROCHLORIDE 10 MG/ML
INJECTION, SOLUTION INFILTRATION; PERINEURAL AS NEEDED
Status: DISCONTINUED | OUTPATIENT
Start: 2020-09-23 | End: 2020-09-23 | Stop reason: SURG

## 2020-09-23 RX ORDER — ONDANSETRON 2 MG/ML
4 INJECTION INTRAMUSCULAR; INTRAVENOUS ONCE AS NEEDED
Status: DISCONTINUED | OUTPATIENT
Start: 2020-09-23 | End: 2020-09-23 | Stop reason: HOSPADM

## 2020-09-23 RX ORDER — ONDANSETRON 2 MG/ML
4 INJECTION INTRAMUSCULAR; INTRAVENOUS EVERY 6 HOURS PRN
Status: DISCONTINUED | OUTPATIENT
Start: 2020-09-23 | End: 2020-10-01 | Stop reason: HOSPADM

## 2020-09-23 RX ORDER — FENTANYL CITRATE 50 UG/ML
INJECTION, SOLUTION INTRAMUSCULAR; INTRAVENOUS AS NEEDED
Status: DISCONTINUED | OUTPATIENT
Start: 2020-09-23 | End: 2020-09-23 | Stop reason: SURG

## 2020-09-23 RX ORDER — IPRATROPIUM BROMIDE AND ALBUTEROL SULFATE 2.5; .5 MG/3ML; MG/3ML
3 SOLUTION RESPIRATORY (INHALATION) ONCE AS NEEDED
Status: COMPLETED | OUTPATIENT
Start: 2020-09-23 | End: 2020-09-23

## 2020-09-23 RX ORDER — ROCURONIUM BROMIDE 10 MG/ML
INJECTION, SOLUTION INTRAVENOUS AS NEEDED
Status: DISCONTINUED | OUTPATIENT
Start: 2020-09-23 | End: 2020-09-23 | Stop reason: SURG

## 2020-09-23 RX ORDER — HEPARIN SODIUM 5000 [USP'U]/ML
5000 INJECTION, SOLUTION INTRAVENOUS; SUBCUTANEOUS EVERY 8 HOURS SCHEDULED
Status: DISCONTINUED | OUTPATIENT
Start: 2020-09-24 | End: 2020-10-01 | Stop reason: HOSPADM

## 2020-09-23 RX ADMIN — LOSARTAN POTASSIUM 50 MG: 50 TABLET, FILM COATED ORAL at 17:28

## 2020-09-23 RX ADMIN — ROCURONIUM BROMIDE 10 MG: 10 INJECTION INTRAVENOUS at 11:34

## 2020-09-23 RX ADMIN — LIDOCAINE HYDROCHLORIDE 50 MG: 10 INJECTION, SOLUTION INFILTRATION; PERINEURAL at 11:02

## 2020-09-23 RX ADMIN — FAMOTIDINE 20 MG: 20 TABLET, FILM COATED ORAL at 09:11

## 2020-09-23 RX ADMIN — MORPHINE SULFATE 1 MG: 0.5 INJECTION, SOLUTION EPIDURAL; INTRATHECAL; INTRAVENOUS at 12:48

## 2020-09-23 RX ADMIN — NEOSTIGMINE 5 MG: 1 INJECTION INTRAVENOUS at 12:38

## 2020-09-23 RX ADMIN — IPRATROPIUM BROMIDE AND ALBUTEROL SULFATE 3 ML: 2.5; .5 SOLUTION RESPIRATORY (INHALATION) at 14:22

## 2020-09-23 RX ADMIN — CEFUROXIME 1.5 G: 1.5 INJECTION, POWDER, FOR SOLUTION INTRAVENOUS at 10:49

## 2020-09-23 RX ADMIN — BUPIVACAINE HYDROCHLORIDE 2.5 ML: 2.5 INJECTION, SOLUTION EPIDURAL; INFILTRATION; INTRACAUDAL; PERINEURAL at 12:19

## 2020-09-23 RX ADMIN — MORPHINE SULFATE 3 MG: 0.5 INJECTION, SOLUTION EPIDURAL; INTRATHECAL; INTRAVENOUS at 11:12

## 2020-09-23 RX ADMIN — FENTANYL CITRATE 100 MCG: 50 INJECTION, SOLUTION INTRAMUSCULAR; INTRAVENOUS at 11:02

## 2020-09-23 RX ADMIN — SODIUM CHLORIDE 1.5 G: 900 INJECTION INTRAVENOUS at 20:09

## 2020-09-23 RX ADMIN — PROPOFOL 150 MG: 10 INJECTION, EMULSION INTRAVENOUS at 11:02

## 2020-09-23 RX ADMIN — ROCURONIUM BROMIDE 10 MG: 10 INJECTION INTRAVENOUS at 12:00

## 2020-09-23 RX ADMIN — SODIUM CHLORIDE, POTASSIUM CHLORIDE, SODIUM LACTATE AND CALCIUM CHLORIDE 9 ML/HR: 600; 310; 30; 20 INJECTION, SOLUTION INTRAVENOUS at 09:11

## 2020-09-23 RX ADMIN — HYDROMORPHONE HYDROCHLORIDE 0.5 MG: 1 INJECTION, SOLUTION INTRAMUSCULAR; INTRAVENOUS; SUBCUTANEOUS at 13:51

## 2020-09-23 RX ADMIN — SODIUM CHLORIDE 30 ML/HR: 9 INJECTION, SOLUTION INTRAVENOUS at 16:14

## 2020-09-23 RX ADMIN — BUPIVACAINE HYDROCHLORIDE 2.5 ML: 2.5 INJECTION, SOLUTION EPIDURAL; INFILTRATION; INTRACAUDAL; PERINEURAL at 12:39

## 2020-09-23 RX ADMIN — BUPIVACAINE HYDROCHLORIDE 2.5 ML: 2.5 INJECTION, SOLUTION EPIDURAL; INFILTRATION; INTRACAUDAL; PERINEURAL at 13:07

## 2020-09-23 RX ADMIN — HYDROMORPHONE HYDROCHLORIDE 0.5 MG: 1 INJECTION, SOLUTION INTRAMUSCULAR; INTRAVENOUS; SUBCUTANEOUS at 13:44

## 2020-09-23 RX ADMIN — ROCURONIUM BROMIDE 10 MG: 10 INJECTION INTRAVENOUS at 11:40

## 2020-09-23 RX ADMIN — CARVEDILOL 12.5 MG: 12.5 TABLET, FILM COATED ORAL at 17:28

## 2020-09-23 RX ADMIN — LIDOCAINE HYDROCHLORIDE 0.5 ML: 10 INJECTION, SOLUTION EPIDURAL; INFILTRATION; INTRACAUDAL; PERINEURAL at 09:11

## 2020-09-23 RX ADMIN — BUPIVACAINE HYDROCHLORIDE: 5 INJECTION, SOLUTION EPIDURAL; INTRACAUDAL; PERINEURAL at 13:31

## 2020-09-23 RX ADMIN — ONDANSETRON 4 MG: 2 INJECTION INTRAMUSCULAR; INTRAVENOUS at 12:29

## 2020-09-23 RX ADMIN — DEXAMETHASONE SODIUM PHOSPHATE 8 MG: 4 INJECTION, SOLUTION INTRAMUSCULAR; INTRAVENOUS at 11:11

## 2020-09-23 RX ADMIN — GLYCOPYRROLATE 0.8 MG: 0.2 INJECTION INTRAMUSCULAR; INTRAVENOUS at 12:38

## 2020-09-23 RX ADMIN — PROPOFOL 20 MG: 10 INJECTION, EMULSION INTRAVENOUS at 11:06

## 2020-09-23 RX ADMIN — MUPIROCIN 1 APPLICATION: 20 OINTMENT TOPICAL at 09:11

## 2020-09-23 RX ADMIN — PROPOFOL 20 MG: 10 INJECTION, EMULSION INTRAVENOUS at 11:07

## 2020-09-23 RX ADMIN — ROCURONIUM BROMIDE 50 MG: 10 INJECTION INTRAVENOUS at 11:02

## 2020-09-23 RX ADMIN — EPHEDRINE SULFATE 10 MG: 50 INJECTION INTRAMUSCULAR; INTRAVENOUS; SUBCUTANEOUS at 11:17

## 2020-09-23 NOTE — ANESTHESIA PROCEDURE NOTES
Epidural Block    Pre-sedation assessment completed: 9/23/2020 9:15 AM    Patient reassessed immediately prior to procedure    Patient location during procedure: pre-op  Start Time: 9/23/2020 9:15 AM  Stop Time: 9/23/2020 9:35 AM  Indication:at surgeon's request and post-op pain management  Performed By  Anesthesiologist: Eliazar Richardson MD  Preanesthetic Checklist  Completed: patient identified, site marked, surgical consent, pre-op evaluation, timeout performed, IV checked, risks and benefits discussed and monitors and equipment checked  Additional Notes  Procedure:                                                                                   The pt. was placed in the sitting position and the legs placed over the side of bed in position of comfort.  The pt was instructed in optimal spine presentation and the insertion site was prepped and draped in sterile fashion.  The insertion site was anesthetized with 1% Lidocaine 2 ml.  The Epidural needle was placed without difficulty and Loss of Resistance was achieved. The labeled Epidural  Catheter was  secured at the insertion site with skin afix, mastisol,  steri strips and dressed with CHG Tegaderm.  Remaining catheter was taped to the back in a cephalad direction.  Test dose with Lidocaine 1.5% with Epinephrine 1:200,000 mcg 2ml was negative for intravascular injection  and negative for spinal injection.  Thank you.      Prep:  Pt Position:sitting  Sterile Tech:cap, gloves, mask and sterile barrier  Prep:chlorhexidine gluconate and isopropyl alcohol  Monitoring:blood pressure monitoring, continuous pulse oximetry and EKG  Epidural Block Procedure:  Approach:midline  Guidance:landmark technique and palpation technique  Location:thoracic  Level:7-8  Needle Type:Tuohy  Needle Gauge:18 G  Cath Size: 20 G  Loss of Resistance Medium: air  Loss of Resistance: 7cm  Cath Depth at skin:12 cm  Paresthesia: none  Aspiration:negative  Test Dose:negative  Post  Assessment:  Dressing:biopatch applied, occlusive dressing applied and secured with tape  Pt Tolerance:patient tolerated the procedure well with no apparent complications  Complications:no

## 2020-09-23 NOTE — ANESTHESIA PROCEDURE NOTES
Airway  Urgency: elective    Date/Time: 9/23/2020 11:08 AM  Airway not difficult    General Information and Staff    Patient location during procedure: OR  CRNA: Erin Barrett CRNA    Indications and Patient Condition  Indications for airway management: airway protection    Preoxygenated: yes  MILS not maintained throughout  Mask difficulty assessment: 1 - vent by mask    Final Airway Details  Final airway type: endotracheal airway      Successful airway: EBT - double lumen left  Cuffed: yes   Successful intubation technique: video laryngoscopy  Facilitating devices/methods: intubating stylet  Endotracheal tube insertion site: oral  Blade: Stovall  Blade size: 4  EBT DL size (fr): 37  Cormack-Lehane Classification: grade I - full view of glottis  Placement verified by: chest auscultation and capnometry   Measured from: teeth  Number of attempts at approach: 2  Assessment: lips, teeth, and gum same as pre-op and atraumatic intubation    Additional Comments  Smooth IV induction.  Video laryngoscopy with Stovall. +ETCO2. Unable to verify correct placement with bronchoscopy.  Removed tube.  Video laryngoscopy with Stovall.  Atraumatic ET intubation. Correct placement verified by Dr Booth by bronchoscopy.  Dentition unchanged.  Negative epigastric sounds, Breath sound equal bilaterally with symmetric chest rise and fall

## 2020-09-23 NOTE — ANESTHESIA PREPROCEDURE EVALUATION
Anesthesia Evaluation                  Airway   Mallampati: I  TM distance: >3 FB  Neck ROM: full  No difficulty expected  Dental      Pulmonary    (+) lung cancer,   Cardiovascular     ECG reviewed    (+) hypertension, valvular problems/murmurs AI, CAD, cardiac stents more than 12 months ago     ROS comment: Reviewed echo and cath data    Neuro/Psych  GI/Hepatic/Renal/Endo    (+)  GERD,      Musculoskeletal     Abdominal    Substance History      OB/GYN          Other                        Anesthesia Plan    ASA 3     general     intravenous induction     Anesthetic plan, all risks, benefits, and alternatives have been provided, discussed and informed consent has been obtained with: patient.    Plan discussed with CRNA.

## 2020-09-24 ENCOUNTER — APPOINTMENT (OUTPATIENT)
Dept: GENERAL RADIOLOGY | Facility: HOSPITAL | Age: 60
End: 2020-09-24

## 2020-09-24 LAB
ANION GAP SERPL CALCULATED.3IONS-SCNC: 9 MMOL/L (ref 5–15)
BASOPHILS # BLD AUTO: 0.03 10*3/MM3 (ref 0–0.2)
BASOPHILS NFR BLD AUTO: 0.2 % (ref 0–1.5)
BUN SERPL-MCNC: 17 MG/DL (ref 8–23)
BUN/CREAT SERPL: 18.3 (ref 7–25)
CALCIUM SPEC-SCNC: 8.5 MG/DL (ref 8.6–10.5)
CHLORIDE SERPL-SCNC: 100 MMOL/L (ref 98–107)
CO2 SERPL-SCNC: 27 MMOL/L (ref 22–29)
CREAT SERPL-MCNC: 0.93 MG/DL (ref 0.76–1.27)
DEPRECATED RDW RBC AUTO: 47.7 FL (ref 37–54)
EOSINOPHIL # BLD AUTO: 0.12 10*3/MM3 (ref 0–0.4)
EOSINOPHIL NFR BLD AUTO: 0.7 % (ref 0.3–6.2)
ERYTHROCYTE [DISTWIDTH] IN BLOOD BY AUTOMATED COUNT: 13.5 % (ref 12.3–15.4)
GFR SERPL CREATININE-BSD FRML MDRD: 83 ML/MIN/1.73
GLUCOSE BLDC GLUCOMTR-MCNC: 129 MG/DL (ref 70–130)
GLUCOSE BLDC GLUCOMTR-MCNC: 131 MG/DL (ref 70–130)
GLUCOSE BLDC GLUCOMTR-MCNC: 139 MG/DL (ref 70–130)
GLUCOSE BLDC GLUCOMTR-MCNC: 142 MG/DL (ref 70–130)
GLUCOSE SERPL-MCNC: 162 MG/DL (ref 65–99)
HCT VFR BLD AUTO: 47.6 % (ref 37.5–51)
HGB BLD-MCNC: 15 G/DL (ref 13–17.7)
IMM GRANULOCYTES # BLD AUTO: 0.13 10*3/MM3 (ref 0–0.05)
IMM GRANULOCYTES NFR BLD AUTO: 0.7 % (ref 0–0.5)
LYMPHOCYTES # BLD AUTO: 1.45 10*3/MM3 (ref 0.7–3.1)
LYMPHOCYTES NFR BLD AUTO: 8 % (ref 19.6–45.3)
MCH RBC QN AUTO: 30.1 PG (ref 26.6–33)
MCHC RBC AUTO-ENTMCNC: 31.5 G/DL (ref 31.5–35.7)
MCV RBC AUTO: 95.6 FL (ref 79–97)
MONOCYTES # BLD AUTO: 1.74 10*3/MM3 (ref 0.1–0.9)
MONOCYTES NFR BLD AUTO: 9.6 % (ref 5–12)
NEUTROPHILS NFR BLD AUTO: 14.61 10*3/MM3 (ref 1.7–7)
NEUTROPHILS NFR BLD AUTO: 80.8 % (ref 42.7–76)
NRBC BLD AUTO-RTO: 0 /100 WBC (ref 0–0.2)
PLATELET # BLD AUTO: 230 10*3/MM3 (ref 140–450)
PMV BLD AUTO: 10.9 FL (ref 6–12)
POTASSIUM SERPL-SCNC: 4.5 MMOL/L (ref 3.5–5.2)
RBC # BLD AUTO: 4.98 10*6/MM3 (ref 4.14–5.8)
SODIUM SERPL-SCNC: 136 MMOL/L (ref 136–145)
WBC # BLD AUTO: 18.08 10*3/MM3 (ref 3.4–10.8)

## 2020-09-24 PROCEDURE — 99024 POSTOP FOLLOW-UP VISIT: CPT | Performed by: THORACIC SURGERY (CARDIOTHORACIC VASCULAR SURGERY)

## 2020-09-24 PROCEDURE — 82962 GLUCOSE BLOOD TEST: CPT

## 2020-09-24 PROCEDURE — 71045 X-RAY EXAM CHEST 1 VIEW: CPT

## 2020-09-24 PROCEDURE — 25010000002 HEPARIN (PORCINE) PER 1000 UNITS: Performed by: PHYSICIAN ASSISTANT

## 2020-09-24 PROCEDURE — 25010000002 MORPHINE PER 10 MG: Performed by: NURSE ANESTHETIST, CERTIFIED REGISTERED

## 2020-09-24 PROCEDURE — 85025 COMPLETE CBC W/AUTO DIFF WBC: CPT | Performed by: PHYSICIAN ASSISTANT

## 2020-09-24 PROCEDURE — 80048 BASIC METABOLIC PNL TOTAL CA: CPT | Performed by: PHYSICIAN ASSISTANT

## 2020-09-24 PROCEDURE — 25010000003 CEFUROXIME SODIUM 1.5 G RECONSTITUTED SOLUTION: Performed by: PHYSICIAN ASSISTANT

## 2020-09-24 PROCEDURE — 99232 SBSQ HOSP IP/OBS MODERATE 35: CPT | Performed by: INTERNAL MEDICINE

## 2020-09-24 RX ORDER — TAMSULOSIN HYDROCHLORIDE 0.4 MG/1
0.4 CAPSULE ORAL DAILY
Status: DISCONTINUED | OUTPATIENT
Start: 2020-09-24 | End: 2020-10-01 | Stop reason: HOSPADM

## 2020-09-24 RX ORDER — LIDOCAINE HYDROCHLORIDE 20 MG/ML
JELLY TOPICAL EVERY 4 HOURS PRN
Status: DISCONTINUED | OUTPATIENT
Start: 2020-09-24 | End: 2020-10-01 | Stop reason: HOSPADM

## 2020-09-24 RX ADMIN — ASPIRIN 81 MG: 81 TABLET, COATED ORAL at 08:19

## 2020-09-24 RX ADMIN — HEPARIN SODIUM 5000 UNITS: 5000 INJECTION INTRAVENOUS; SUBCUTANEOUS at 05:28

## 2020-09-24 RX ADMIN — PANTOPRAZOLE SODIUM 40 MG: 40 TABLET, DELAYED RELEASE ORAL at 08:18

## 2020-09-24 RX ADMIN — BUPIVACAINE HYDROCHLORIDE: 5 INJECTION, SOLUTION EPIDURAL; INTRACAUDAL; PERINEURAL at 23:45

## 2020-09-24 RX ADMIN — HEPARIN SODIUM 5000 UNITS: 5000 INJECTION INTRAVENOUS; SUBCUTANEOUS at 15:20

## 2020-09-24 RX ADMIN — HYDROCODONE BITARTRATE AND ACETAMINOPHEN 1 TABLET: 7.5; 325 TABLET ORAL at 22:51

## 2020-09-24 RX ADMIN — HYDROCODONE BITARTRATE AND ACETAMINOPHEN 1 TABLET: 7.5; 325 TABLET ORAL at 17:54

## 2020-09-24 RX ADMIN — HEPARIN SODIUM 5000 UNITS: 5000 INJECTION INTRAVENOUS; SUBCUTANEOUS at 22:42

## 2020-09-24 RX ADMIN — LOSARTAN POTASSIUM 50 MG: 50 TABLET, FILM COATED ORAL at 08:18

## 2020-09-24 RX ADMIN — CARVEDILOL 12.5 MG: 12.5 TABLET, FILM COATED ORAL at 08:18

## 2020-09-24 RX ADMIN — HYDROCODONE BITARTRATE AND ACETAMINOPHEN 1 TABLET: 7.5; 325 TABLET ORAL at 08:22

## 2020-09-24 RX ADMIN — LIDOCAINE HYDROCHLORIDE: 20 JELLY TOPICAL at 19:02

## 2020-09-24 RX ADMIN — LIDOCAINE HYDROCHLORIDE: 20 JELLY TOPICAL at 04:55

## 2020-09-24 RX ADMIN — HYDROCODONE BITARTRATE AND ACETAMINOPHEN 1 TABLET: 7.5; 325 TABLET ORAL at 12:56

## 2020-09-24 RX ADMIN — TAMSULOSIN HYDROCHLORIDE 0.4 MG: 0.4 CAPSULE ORAL at 12:50

## 2020-09-24 RX ADMIN — CARVEDILOL 12.5 MG: 12.5 TABLET, FILM COATED ORAL at 17:54

## 2020-09-24 RX ADMIN — SODIUM CHLORIDE 1.5 G: 900 INJECTION INTRAVENOUS at 02:27

## 2020-09-25 ENCOUNTER — APPOINTMENT (OUTPATIENT)
Dept: GENERAL RADIOLOGY | Facility: HOSPITAL | Age: 60
End: 2020-09-25

## 2020-09-25 LAB
ANION GAP SERPL CALCULATED.3IONS-SCNC: 9 MMOL/L (ref 5–15)
BUN SERPL-MCNC: 24 MG/DL (ref 8–23)
BUN/CREAT SERPL: 24.7 (ref 7–25)
CALCIUM SPEC-SCNC: 8.8 MG/DL (ref 8.6–10.5)
CHLORIDE SERPL-SCNC: 97 MMOL/L (ref 98–107)
CO2 SERPL-SCNC: 28 MMOL/L (ref 22–29)
CREAT SERPL-MCNC: 0.97 MG/DL (ref 0.76–1.27)
CYTO UR: NORMAL
DEPRECATED RDW RBC AUTO: 49.8 FL (ref 37–54)
ERYTHROCYTE [DISTWIDTH] IN BLOOD BY AUTOMATED COUNT: 13.5 % (ref 12.3–15.4)
GFR SERPL CREATININE-BSD FRML MDRD: 79 ML/MIN/1.73
GLUCOSE BLDC GLUCOMTR-MCNC: 121 MG/DL (ref 70–130)
GLUCOSE BLDC GLUCOMTR-MCNC: 125 MG/DL (ref 70–130)
GLUCOSE SERPL-MCNC: 139 MG/DL (ref 65–99)
HCT VFR BLD AUTO: 44.4 % (ref 37.5–51)
HGB BLD-MCNC: 13.6 G/DL (ref 13–17.7)
LAB AP CASE REPORT: NORMAL
LAB AP CLINICAL INFORMATION: NORMAL
LAB AP DIAGNOSIS COMMENT: NORMAL
Lab: NORMAL
MCH RBC QN AUTO: 30.4 PG (ref 26.6–33)
MCHC RBC AUTO-ENTMCNC: 30.6 G/DL (ref 31.5–35.7)
MCV RBC AUTO: 99.3 FL (ref 79–97)
PATH REPORT.FINAL DX SPEC: NORMAL
PATH REPORT.GROSS SPEC: NORMAL
PLATELET # BLD AUTO: 202 10*3/MM3 (ref 140–450)
PMV BLD AUTO: 11.1 FL (ref 6–12)
POTASSIUM SERPL-SCNC: 4.2 MMOL/L (ref 3.5–5.2)
RBC # BLD AUTO: 4.47 10*6/MM3 (ref 4.14–5.8)
SODIUM SERPL-SCNC: 134 MMOL/L (ref 136–145)
WBC # BLD AUTO: 14.21 10*3/MM3 (ref 3.4–10.8)

## 2020-09-25 PROCEDURE — 82962 GLUCOSE BLOOD TEST: CPT

## 2020-09-25 PROCEDURE — 94799 UNLISTED PULMONARY SVC/PX: CPT

## 2020-09-25 PROCEDURE — 71045 X-RAY EXAM CHEST 1 VIEW: CPT

## 2020-09-25 PROCEDURE — 25010000002 FUROSEMIDE PER 20 MG: Performed by: PHYSICIAN ASSISTANT

## 2020-09-25 PROCEDURE — 85027 COMPLETE CBC AUTOMATED: CPT | Performed by: PHYSICIAN ASSISTANT

## 2020-09-25 PROCEDURE — 25010000002 MORPHINE PER 10 MG: Performed by: PHYSICIAN ASSISTANT

## 2020-09-25 PROCEDURE — 25010000002 HEPARIN (PORCINE) PER 1000 UNITS: Performed by: PHYSICIAN ASSISTANT

## 2020-09-25 PROCEDURE — 99024 POSTOP FOLLOW-UP VISIT: CPT | Performed by: THORACIC SURGERY (CARDIOTHORACIC VASCULAR SURGERY)

## 2020-09-25 PROCEDURE — 99254 IP/OBS CNSLTJ NEW/EST MOD 60: CPT | Performed by: INTERNAL MEDICINE

## 2020-09-25 PROCEDURE — 80048 BASIC METABOLIC PNL TOTAL CA: CPT | Performed by: PHYSICIAN ASSISTANT

## 2020-09-25 PROCEDURE — 99232 SBSQ HOSP IP/OBS MODERATE 35: CPT | Performed by: INTERNAL MEDICINE

## 2020-09-25 RX ORDER — FUROSEMIDE 10 MG/ML
40 INJECTION INTRAMUSCULAR; INTRAVENOUS ONCE
Status: COMPLETED | OUTPATIENT
Start: 2020-09-25 | End: 2020-09-25

## 2020-09-25 RX ORDER — SENNA PLUS 8.6 MG/1
1 TABLET ORAL NIGHTLY
Status: DISCONTINUED | OUTPATIENT
Start: 2020-09-25 | End: 2020-09-25

## 2020-09-25 RX ORDER — AMOXICILLIN 250 MG
2 CAPSULE ORAL 2 TIMES DAILY
Status: DISCONTINUED | OUTPATIENT
Start: 2020-09-25 | End: 2020-10-01 | Stop reason: HOSPADM

## 2020-09-25 RX ORDER — BISACODYL 10 MG
10 SUPPOSITORY, RECTAL RECTAL DAILY PRN
Status: DISCONTINUED | OUTPATIENT
Start: 2020-09-25 | End: 2020-10-01 | Stop reason: HOSPADM

## 2020-09-25 RX ORDER — POLYETHYLENE GLYCOL 3350 17 G/17G
17 POWDER, FOR SOLUTION ORAL DAILY
Status: DISCONTINUED | OUTPATIENT
Start: 2020-09-25 | End: 2020-10-01 | Stop reason: HOSPADM

## 2020-09-25 RX ADMIN — ASPIRIN 81 MG: 81 TABLET, COATED ORAL at 08:20

## 2020-09-25 RX ADMIN — LOSARTAN POTASSIUM 50 MG: 50 TABLET, FILM COATED ORAL at 08:20

## 2020-09-25 RX ADMIN — MORPHINE SULFATE 2 MG: 4 INJECTION, SOLUTION INTRAMUSCULAR; INTRAVENOUS at 22:50

## 2020-09-25 RX ADMIN — SODIUM CHLORIDE 30 ML/HR: 9 INJECTION, SOLUTION INTRAVENOUS at 02:08

## 2020-09-25 RX ADMIN — HYDROCODONE BITARTRATE AND ACETAMINOPHEN 1 TABLET: 7.5; 325 TABLET ORAL at 03:59

## 2020-09-25 RX ADMIN — CARVEDILOL 12.5 MG: 12.5 TABLET, FILM COATED ORAL at 08:20

## 2020-09-25 RX ADMIN — LIDOCAINE HYDROCHLORIDE: 20 JELLY TOPICAL at 12:25

## 2020-09-25 RX ADMIN — MORPHINE SULFATE 2 MG: 4 INJECTION, SOLUTION INTRAMUSCULAR; INTRAVENOUS at 04:06

## 2020-09-25 RX ADMIN — SODIUM CHLORIDE 500 ML: 9 INJECTION, SOLUTION INTRAVENOUS at 18:37

## 2020-09-25 RX ADMIN — DOCUSATE SODIUM 50MG AND SENNOSIDES 8.6MG 2 TABLET: 8.6; 5 TABLET, FILM COATED ORAL at 22:48

## 2020-09-25 RX ADMIN — Medication: at 21:00

## 2020-09-25 RX ADMIN — PANTOPRAZOLE SODIUM 40 MG: 40 TABLET, DELAYED RELEASE ORAL at 08:20

## 2020-09-25 RX ADMIN — HYDROCODONE BITARTRATE AND ACETAMINOPHEN 1 TABLET: 7.5; 325 TABLET ORAL at 08:20

## 2020-09-25 RX ADMIN — HEPARIN SODIUM 5000 UNITS: 5000 INJECTION INTRAVENOUS; SUBCUTANEOUS at 15:03

## 2020-09-25 RX ADMIN — DOCUSATE SODIUM 50MG AND SENNOSIDES 8.6MG 2 TABLET: 8.6; 5 TABLET, FILM COATED ORAL at 12:25

## 2020-09-25 RX ADMIN — HYDROCODONE BITARTRATE AND ACETAMINOPHEN 1 TABLET: 7.5; 325 TABLET ORAL at 12:54

## 2020-09-25 RX ADMIN — TAMSULOSIN HYDROCHLORIDE 0.4 MG: 0.4 CAPSULE ORAL at 08:20

## 2020-09-25 RX ADMIN — HEPARIN SODIUM 5000 UNITS: 5000 INJECTION INTRAVENOUS; SUBCUTANEOUS at 22:49

## 2020-09-25 RX ADMIN — POLYETHYLENE GLYCOL 3350 17 G: 17 POWDER, FOR SOLUTION ORAL at 12:25

## 2020-09-25 RX ADMIN — HEPARIN SODIUM 5000 UNITS: 5000 INJECTION INTRAVENOUS; SUBCUTANEOUS at 05:29

## 2020-09-25 RX ADMIN — FUROSEMIDE 40 MG: 10 INJECTION, SOLUTION INTRAMUSCULAR; INTRAVENOUS at 09:46

## 2020-09-26 ENCOUNTER — APPOINTMENT (OUTPATIENT)
Dept: GENERAL RADIOLOGY | Facility: HOSPITAL | Age: 60
End: 2020-09-26

## 2020-09-26 LAB
ALBUMIN SERPL-MCNC: 3.1 G/DL (ref 3.5–5.2)
ALBUMIN SERPL-MCNC: 3.3 G/DL (ref 3.5–5.2)
ALBUMIN/GLOB SERPL: 1.2 G/DL
ALP SERPL-CCNC: 71 U/L (ref 39–117)
ALT SERPL W P-5'-P-CCNC: 35 U/L (ref 1–41)
ANION GAP SERPL CALCULATED.3IONS-SCNC: 10 MMOL/L (ref 5–15)
ANION GAP SERPL CALCULATED.3IONS-SCNC: 6 MMOL/L (ref 5–15)
AST SERPL-CCNC: 41 U/L (ref 1–40)
BASOPHILS # BLD AUTO: 0.03 10*3/MM3 (ref 0–0.2)
BASOPHILS NFR BLD AUTO: 0.3 % (ref 0–1.5)
BH BB BLOOD EXPIRATION DATE: NORMAL
BH BB BLOOD EXPIRATION DATE: NORMAL
BH BB BLOOD TYPE BARCODE: 5100
BH BB BLOOD TYPE BARCODE: 5100
BH BB DISPENSE STATUS: NORMAL
BH BB DISPENSE STATUS: NORMAL
BH BB PRODUCT CODE: NORMAL
BH BB PRODUCT CODE: NORMAL
BH BB UNIT NUMBER: NORMAL
BH BB UNIT NUMBER: NORMAL
BILIRUB SERPL-MCNC: 0.9 MG/DL (ref 0–1.2)
BUN SERPL-MCNC: 23 MG/DL (ref 8–23)
BUN SERPL-MCNC: 24 MG/DL (ref 8–23)
BUN/CREAT SERPL: 28 (ref 7–25)
BUN/CREAT SERPL: 30.4 (ref 7–25)
CALCIUM SPEC-SCNC: 8.6 MG/DL (ref 8.6–10.5)
CALCIUM SPEC-SCNC: 8.8 MG/DL (ref 8.6–10.5)
CHLORIDE SERPL-SCNC: 96 MMOL/L (ref 98–107)
CHLORIDE SERPL-SCNC: 97 MMOL/L (ref 98–107)
CO2 SERPL-SCNC: 27 MMOL/L (ref 22–29)
CO2 SERPL-SCNC: 30 MMOL/L (ref 22–29)
CREAT SERPL-MCNC: 0.79 MG/DL (ref 0.76–1.27)
CREAT SERPL-MCNC: 0.82 MG/DL (ref 0.76–1.27)
CROSSMATCH INTERPRETATION: NORMAL
CROSSMATCH INTERPRETATION: NORMAL
DEPRECATED RDW RBC AUTO: 45.7 FL (ref 37–54)
EOSINOPHIL # BLD AUTO: 0.17 10*3/MM3 (ref 0–0.4)
EOSINOPHIL NFR BLD AUTO: 1.5 % (ref 0.3–6.2)
ERYTHROCYTE [DISTWIDTH] IN BLOOD BY AUTOMATED COUNT: 13 % (ref 12.3–15.4)
GFR SERPL CREATININE-BSD FRML MDRD: 100 ML/MIN/1.73
GFR SERPL CREATININE-BSD FRML MDRD: 96 ML/MIN/1.73
GLOBULIN UR ELPH-MCNC: 2.8 GM/DL
GLUCOSE BLDC GLUCOMTR-MCNC: 110 MG/DL (ref 70–130)
GLUCOSE BLDC GLUCOMTR-MCNC: 114 MG/DL (ref 70–130)
GLUCOSE BLDC GLUCOMTR-MCNC: 122 MG/DL (ref 70–130)
GLUCOSE BLDC GLUCOMTR-MCNC: 131 MG/DL (ref 70–130)
GLUCOSE SERPL-MCNC: 123 MG/DL (ref 65–99)
GLUCOSE SERPL-MCNC: 124 MG/DL (ref 65–99)
HCT VFR BLD AUTO: 39.3 % (ref 37.5–51)
HGB BLD-MCNC: 12.3 G/DL (ref 13–17.7)
IMM GRANULOCYTES # BLD AUTO: 0.09 10*3/MM3 (ref 0–0.05)
IMM GRANULOCYTES NFR BLD AUTO: 0.8 % (ref 0–0.5)
LYMPHOCYTES # BLD AUTO: 1.79 10*3/MM3 (ref 0.7–3.1)
LYMPHOCYTES NFR BLD AUTO: 16.1 % (ref 19.6–45.3)
MCH RBC QN AUTO: 29.9 PG (ref 26.6–33)
MCHC RBC AUTO-ENTMCNC: 31.3 G/DL (ref 31.5–35.7)
MCV RBC AUTO: 95.6 FL (ref 79–97)
MONOCYTES # BLD AUTO: 1.15 10*3/MM3 (ref 0.1–0.9)
MONOCYTES NFR BLD AUTO: 10.3 % (ref 5–12)
NEUTROPHILS NFR BLD AUTO: 7.92 10*3/MM3 (ref 1.7–7)
NEUTROPHILS NFR BLD AUTO: 71 % (ref 42.7–76)
NRBC BLD AUTO-RTO: 0 /100 WBC (ref 0–0.2)
PHOSPHATE SERPL-MCNC: 2.4 MG/DL (ref 2.5–4.5)
PLATELET # BLD AUTO: 200 10*3/MM3 (ref 140–450)
PMV BLD AUTO: 10.7 FL (ref 6–12)
POTASSIUM SERPL-SCNC: 4.4 MMOL/L (ref 3.5–5.2)
POTASSIUM SERPL-SCNC: 4.5 MMOL/L (ref 3.5–5.2)
PROT SERPL-MCNC: 6.1 G/DL (ref 6–8.5)
RBC # BLD AUTO: 4.11 10*6/MM3 (ref 4.14–5.8)
SODIUM SERPL-SCNC: 132 MMOL/L (ref 136–145)
SODIUM SERPL-SCNC: 134 MMOL/L (ref 136–145)
UNIT  ABO: NORMAL
UNIT  ABO: NORMAL
UNIT  RH: NORMAL
UNIT  RH: NORMAL
WBC # BLD AUTO: 11.15 10*3/MM3 (ref 3.4–10.8)

## 2020-09-26 PROCEDURE — 80053 COMPREHEN METABOLIC PANEL: CPT | Performed by: NURSE PRACTITIONER

## 2020-09-26 PROCEDURE — 25010000002 MORPHINE PER 10 MG: Performed by: NURSE ANESTHETIST, CERTIFIED REGISTERED

## 2020-09-26 PROCEDURE — 99232 SBSQ HOSP IP/OBS MODERATE 35: CPT | Performed by: INTERNAL MEDICINE

## 2020-09-26 PROCEDURE — 82962 GLUCOSE BLOOD TEST: CPT

## 2020-09-26 PROCEDURE — 71045 X-RAY EXAM CHEST 1 VIEW: CPT

## 2020-09-26 PROCEDURE — 25010000002 ALBUMIN HUMAN 25% PER 50 ML: Performed by: PHYSICIAN ASSISTANT

## 2020-09-26 PROCEDURE — 25010000002 PHENYLEPHRINE 10 MG/ML SOLUTION: Performed by: NURSE PRACTITIONER

## 2020-09-26 PROCEDURE — 25010000002 MORPHINE PER 10 MG: Performed by: PHYSICIAN ASSISTANT

## 2020-09-26 PROCEDURE — P9047 ALBUMIN (HUMAN), 25%, 50ML: HCPCS | Performed by: PHYSICIAN ASSISTANT

## 2020-09-26 PROCEDURE — 85025 COMPLETE CBC W/AUTO DIFF WBC: CPT | Performed by: NURSE PRACTITIONER

## 2020-09-26 PROCEDURE — 25010000002 HEPARIN (PORCINE) PER 1000 UNITS: Performed by: PHYSICIAN ASSISTANT

## 2020-09-26 PROCEDURE — 80069 RENAL FUNCTION PANEL: CPT | Performed by: INTERNAL MEDICINE

## 2020-09-26 PROCEDURE — 94799 UNLISTED PULMONARY SVC/PX: CPT

## 2020-09-26 RX ORDER — ALBUMIN (HUMAN) 12.5 G/50ML
12.5 SOLUTION INTRAVENOUS ONCE
Status: COMPLETED | OUTPATIENT
Start: 2020-09-26 | End: 2020-09-26

## 2020-09-26 RX ORDER — PHENYLEPHRINE HCL IN 0.9% NACL 0.5 MG/5ML
.5-3 SYRINGE (ML) INTRAVENOUS
Status: DISCONTINUED | OUTPATIENT
Start: 2020-09-26 | End: 2020-09-28

## 2020-09-26 RX ORDER — CARVEDILOL 3.12 MG/1
3.12 TABLET ORAL 2 TIMES DAILY WITH MEALS
Status: DISCONTINUED | OUTPATIENT
Start: 2020-09-26 | End: 2020-10-01 | Stop reason: HOSPADM

## 2020-09-26 RX ORDER — FUROSEMIDE 10 MG/ML
40 INJECTION INTRAMUSCULAR; INTRAVENOUS ONCE
Status: DISCONTINUED | OUTPATIENT
Start: 2020-09-26 | End: 2020-09-29

## 2020-09-26 RX ADMIN — MORPHINE SULFATE 2 MG: 4 INJECTION, SOLUTION INTRAMUSCULAR; INTRAVENOUS at 22:23

## 2020-09-26 RX ADMIN — Medication 2 MCG/KG/MIN: at 18:25

## 2020-09-26 RX ADMIN — HEPARIN SODIUM 5000 UNITS: 5000 INJECTION INTRAVENOUS; SUBCUTANEOUS at 06:02

## 2020-09-26 RX ADMIN — HYDROCODONE BITARTRATE AND ACETAMINOPHEN 1 TABLET: 7.5; 325 TABLET ORAL at 06:02

## 2020-09-26 RX ADMIN — SODIUM CHLORIDE 30 ML/HR: 9 INJECTION, SOLUTION INTRAVENOUS at 14:10

## 2020-09-26 RX ADMIN — TAMSULOSIN HYDROCHLORIDE 0.4 MG: 0.4 CAPSULE ORAL at 10:09

## 2020-09-26 RX ADMIN — HYDROCODONE BITARTRATE AND ACETAMINOPHEN 1 TABLET: 7.5; 325 TABLET ORAL at 20:08

## 2020-09-26 RX ADMIN — ASPIRIN 81 MG: 81 TABLET, COATED ORAL at 10:09

## 2020-09-26 RX ADMIN — MORPHINE SULFATE 2 MG: 4 INJECTION, SOLUTION INTRAMUSCULAR; INTRAVENOUS at 18:03

## 2020-09-26 RX ADMIN — ALBUMIN HUMAN 12.5 G: 0.25 SOLUTION INTRAVENOUS at 15:39

## 2020-09-26 RX ADMIN — MORPHINE SULFATE 2 MG: 4 INJECTION, SOLUTION INTRAMUSCULAR; INTRAVENOUS at 02:55

## 2020-09-26 RX ADMIN — BUPIVACAINE HYDROCHLORIDE: 5 INJECTION, SOLUTION EPIDURAL; INTRACAUDAL; PERINEURAL at 03:26

## 2020-09-26 RX ADMIN — Medication: at 20:09

## 2020-09-26 RX ADMIN — HYDROCODONE BITARTRATE AND ACETAMINOPHEN 1 TABLET: 7.5; 325 TABLET ORAL at 01:52

## 2020-09-26 RX ADMIN — POLYETHYLENE GLYCOL 3350 17 G: 17 POWDER, FOR SOLUTION ORAL at 10:10

## 2020-09-26 RX ADMIN — Medication 0.5 MCG/KG/MIN: at 14:38

## 2020-09-26 RX ADMIN — PANTOPRAZOLE SODIUM 40 MG: 40 TABLET, DELAYED RELEASE ORAL at 10:10

## 2020-09-26 RX ADMIN — DOCUSATE SODIUM 50MG AND SENNOSIDES 8.6MG 2 TABLET: 8.6; 5 TABLET, FILM COATED ORAL at 10:09

## 2020-09-26 RX ADMIN — HYDROCODONE BITARTRATE AND ACETAMINOPHEN 1 TABLET: 7.5; 325 TABLET ORAL at 10:09

## 2020-09-26 RX ADMIN — HEPARIN SODIUM 5000 UNITS: 5000 INJECTION INTRAVENOUS; SUBCUTANEOUS at 17:57

## 2020-09-26 RX ADMIN — Medication 2 MCG/KG/MIN: at 22:40

## 2020-09-26 RX ADMIN — DOCUSATE SODIUM 50MG AND SENNOSIDES 8.6MG 2 TABLET: 8.6; 5 TABLET, FILM COATED ORAL at 20:07

## 2020-09-26 RX ADMIN — CARVEDILOL 12.5 MG: 12.5 TABLET, FILM COATED ORAL at 10:11

## 2020-09-26 RX ADMIN — HEPARIN SODIUM 5000 UNITS: 5000 INJECTION INTRAVENOUS; SUBCUTANEOUS at 22:10

## 2020-09-26 RX ADMIN — SODIUM CHLORIDE 500 ML: 9 INJECTION, SOLUTION INTRAVENOUS at 14:10

## 2020-09-27 ENCOUNTER — APPOINTMENT (OUTPATIENT)
Dept: GENERAL RADIOLOGY | Facility: HOSPITAL | Age: 60
End: 2020-09-27

## 2020-09-27 PROBLEM — Z98.890 S/P THORACOTOMY: Status: ACTIVE | Noted: 2020-09-27

## 2020-09-27 LAB
DEPRECATED RDW RBC AUTO: 45.1 FL (ref 37–54)
ERYTHROCYTE [DISTWIDTH] IN BLOOD BY AUTOMATED COUNT: 12.8 % (ref 12.3–15.4)
GLUCOSE BLDC GLUCOMTR-MCNC: 102 MG/DL (ref 70–130)
GLUCOSE BLDC GLUCOMTR-MCNC: 119 MG/DL (ref 70–130)
GLUCOSE BLDC GLUCOMTR-MCNC: 125 MG/DL (ref 70–130)
GLUCOSE BLDC GLUCOMTR-MCNC: 98 MG/DL (ref 70–130)
HCT VFR BLD AUTO: 35.1 % (ref 37.5–51)
HGB BLD-MCNC: 11.3 G/DL (ref 13–17.7)
MCH RBC QN AUTO: 30.8 PG (ref 26.6–33)
MCHC RBC AUTO-ENTMCNC: 32.2 G/DL (ref 31.5–35.7)
MCV RBC AUTO: 95.6 FL (ref 79–97)
PLATELET # BLD AUTO: 207 10*3/MM3 (ref 140–450)
PMV BLD AUTO: 10.3 FL (ref 6–12)
RBC # BLD AUTO: 3.67 10*6/MM3 (ref 4.14–5.8)
WBC # BLD AUTO: 9.4 10*3/MM3 (ref 3.4–10.8)

## 2020-09-27 PROCEDURE — 85027 COMPLETE CBC AUTOMATED: CPT | Performed by: PHYSICIAN ASSISTANT

## 2020-09-27 PROCEDURE — 82962 GLUCOSE BLOOD TEST: CPT

## 2020-09-27 PROCEDURE — C1894 INTRO/SHEATH, NON-LASER: HCPCS

## 2020-09-27 PROCEDURE — 71045 X-RAY EXAM CHEST 1 VIEW: CPT

## 2020-09-27 PROCEDURE — 25010000002 HEPARIN (PORCINE) PER 1000 UNITS: Performed by: PHYSICIAN ASSISTANT

## 2020-09-27 PROCEDURE — 99232 SBSQ HOSP IP/OBS MODERATE 35: CPT | Performed by: INTERNAL MEDICINE

## 2020-09-27 PROCEDURE — 25010000002 MORPHINE PER 10 MG: Performed by: PHYSICIAN ASSISTANT

## 2020-09-27 PROCEDURE — C1751 CATH, INF, PER/CENT/MIDLINE: HCPCS

## 2020-09-27 PROCEDURE — 25010000002 KETOROLAC TROMETHAMINE PER 15 MG: Performed by: PHYSICIAN ASSISTANT

## 2020-09-27 RX ORDER — SODIUM CHLORIDE 0.9 % (FLUSH) 0.9 %
10 SYRINGE (ML) INJECTION EVERY 12 HOURS SCHEDULED
Status: DISCONTINUED | OUTPATIENT
Start: 2020-09-27 | End: 2020-10-01 | Stop reason: HOSPADM

## 2020-09-27 RX ORDER — SODIUM CHLORIDE 0.9 % (FLUSH) 0.9 %
10 SYRINGE (ML) INJECTION AS NEEDED
Status: DISCONTINUED | OUTPATIENT
Start: 2020-09-27 | End: 2020-10-01 | Stop reason: HOSPADM

## 2020-09-27 RX ORDER — KETOROLAC TROMETHAMINE 30 MG/ML
30 INJECTION, SOLUTION INTRAMUSCULAR; INTRAVENOUS EVERY 8 HOURS PRN
Status: DISCONTINUED | OUTPATIENT
Start: 2020-09-27 | End: 2020-09-28

## 2020-09-27 RX ORDER — OXYMETAZOLINE HYDROCHLORIDE 0.05 G/100ML
2 SPRAY NASAL 2 TIMES DAILY
Status: DISPENSED | OUTPATIENT
Start: 2020-09-27 | End: 2020-09-30

## 2020-09-27 RX ADMIN — Medication 2 SPRAY: at 12:59

## 2020-09-27 RX ADMIN — HEPARIN SODIUM 5000 UNITS: 5000 INJECTION INTRAVENOUS; SUBCUTANEOUS at 06:34

## 2020-09-27 RX ADMIN — KETOROLAC TROMETHAMINE 30 MG: 30 INJECTION, SOLUTION INTRAMUSCULAR at 08:56

## 2020-09-27 RX ADMIN — HYDROCODONE BITARTRATE AND ACETAMINOPHEN 1 TABLET: 7.5; 325 TABLET ORAL at 08:02

## 2020-09-27 RX ADMIN — HEPARIN SODIUM 5000 UNITS: 5000 INJECTION INTRAVENOUS; SUBCUTANEOUS at 20:22

## 2020-09-27 RX ADMIN — HYDROCODONE BITARTRATE AND ACETAMINOPHEN 1 TABLET: 7.5; 325 TABLET ORAL at 22:03

## 2020-09-27 RX ADMIN — POLYETHYLENE GLYCOL 3350 17 G: 17 POWDER, FOR SOLUTION ORAL at 08:55

## 2020-09-27 RX ADMIN — DOCUSATE SODIUM 50MG AND SENNOSIDES 8.6MG 2 TABLET: 8.6; 5 TABLET, FILM COATED ORAL at 08:55

## 2020-09-27 RX ADMIN — MORPHINE SULFATE 2 MG: 4 INJECTION, SOLUTION INTRAMUSCULAR; INTRAVENOUS at 04:53

## 2020-09-27 RX ADMIN — PANTOPRAZOLE SODIUM 40 MG: 40 TABLET, DELAYED RELEASE ORAL at 08:55

## 2020-09-27 RX ADMIN — TAMSULOSIN HYDROCHLORIDE 0.4 MG: 0.4 CAPSULE ORAL at 08:55

## 2020-09-27 RX ADMIN — SODIUM CHLORIDE, PRESERVATIVE FREE 10 ML: 5 INJECTION INTRAVENOUS at 18:11

## 2020-09-27 RX ADMIN — Medication 1.2 MCG/KG/MIN: at 03:32

## 2020-09-27 RX ADMIN — ASPIRIN 81 MG: 81 TABLET, COATED ORAL at 08:55

## 2020-09-27 RX ADMIN — HYDROCODONE BITARTRATE AND ACETAMINOPHEN 1 TABLET: 7.5; 325 TABLET ORAL at 03:01

## 2020-09-27 RX ADMIN — SODIUM CHLORIDE 30 ML/HR: 9 INJECTION, SOLUTION INTRAVENOUS at 01:40

## 2020-09-27 RX ADMIN — KETOROLAC TROMETHAMINE 30 MG: 30 INJECTION, SOLUTION INTRAMUSCULAR at 22:03

## 2020-09-28 ENCOUNTER — APPOINTMENT (OUTPATIENT)
Dept: GENERAL RADIOLOGY | Facility: HOSPITAL | Age: 60
End: 2020-09-28

## 2020-09-28 LAB
ANION GAP SERPL CALCULATED.3IONS-SCNC: 8 MMOL/L (ref 5–15)
BUN SERPL-MCNC: 20 MG/DL (ref 8–23)
BUN/CREAT SERPL: 25.6 (ref 7–25)
CALCIUM SPEC-SCNC: 8.7 MG/DL (ref 8.6–10.5)
CHLORIDE SERPL-SCNC: 102 MMOL/L (ref 98–107)
CO2 SERPL-SCNC: 28 MMOL/L (ref 22–29)
CREAT SERPL-MCNC: 0.78 MG/DL (ref 0.76–1.27)
DEPRECATED RDW RBC AUTO: 43.6 FL (ref 37–54)
ERYTHROCYTE [DISTWIDTH] IN BLOOD BY AUTOMATED COUNT: 12.5 % (ref 12.3–15.4)
GFR SERPL CREATININE-BSD FRML MDRD: 101 ML/MIN/1.73
GLUCOSE BLDC GLUCOMTR-MCNC: 105 MG/DL (ref 70–130)
GLUCOSE BLDC GLUCOMTR-MCNC: 106 MG/DL (ref 70–130)
GLUCOSE BLDC GLUCOMTR-MCNC: 130 MG/DL (ref 70–130)
GLUCOSE BLDC GLUCOMTR-MCNC: 143 MG/DL (ref 70–130)
GLUCOSE SERPL-MCNC: 106 MG/DL (ref 65–99)
HCT VFR BLD AUTO: 36.6 % (ref 37.5–51)
HGB BLD-MCNC: 11.7 G/DL (ref 13–17.7)
MAGNESIUM SERPL-MCNC: 2.3 MG/DL (ref 1.6–2.4)
MCH RBC QN AUTO: 30.5 PG (ref 26.6–33)
MCHC RBC AUTO-ENTMCNC: 32 G/DL (ref 31.5–35.7)
MCV RBC AUTO: 95.6 FL (ref 79–97)
PHOSPHATE SERPL-MCNC: 3.4 MG/DL (ref 2.5–4.5)
PLATELET # BLD AUTO: 212 10*3/MM3 (ref 140–450)
PMV BLD AUTO: 10.1 FL (ref 6–12)
POTASSIUM SERPL-SCNC: 4.2 MMOL/L (ref 3.5–5.2)
RBC # BLD AUTO: 3.83 10*6/MM3 (ref 4.14–5.8)
SODIUM SERPL-SCNC: 138 MMOL/L (ref 136–145)
WBC # BLD AUTO: 6.37 10*3/MM3 (ref 3.4–10.8)

## 2020-09-28 PROCEDURE — 25010000002 MORPHINE PER 10 MG: Performed by: PHYSICIAN ASSISTANT

## 2020-09-28 PROCEDURE — 25010000002 HEPARIN (PORCINE) PER 1000 UNITS: Performed by: PHYSICIAN ASSISTANT

## 2020-09-28 PROCEDURE — 99233 SBSQ HOSP IP/OBS HIGH 50: CPT | Performed by: INTERNAL MEDICINE

## 2020-09-28 PROCEDURE — 99024 POSTOP FOLLOW-UP VISIT: CPT | Performed by: THORACIC SURGERY (CARDIOTHORACIC VASCULAR SURGERY)

## 2020-09-28 PROCEDURE — 25010000002 KETOROLAC TROMETHAMINE PER 15 MG: Performed by: PHYSICIAN ASSISTANT

## 2020-09-28 PROCEDURE — 99232 SBSQ HOSP IP/OBS MODERATE 35: CPT | Performed by: INTERNAL MEDICINE

## 2020-09-28 PROCEDURE — 84100 ASSAY OF PHOSPHORUS: CPT | Performed by: INTERNAL MEDICINE

## 2020-09-28 PROCEDURE — 82962 GLUCOSE BLOOD TEST: CPT

## 2020-09-28 PROCEDURE — 94640 AIRWAY INHALATION TREATMENT: CPT

## 2020-09-28 PROCEDURE — 85027 COMPLETE CBC AUTOMATED: CPT | Performed by: INTERNAL MEDICINE

## 2020-09-28 PROCEDURE — 25010000002 KETOROLAC TROMETHAMINE PER 15 MG: Performed by: INTERNAL MEDICINE

## 2020-09-28 PROCEDURE — 94799 UNLISTED PULMONARY SVC/PX: CPT

## 2020-09-28 PROCEDURE — 83735 ASSAY OF MAGNESIUM: CPT | Performed by: INTERNAL MEDICINE

## 2020-09-28 PROCEDURE — 80048 BASIC METABOLIC PNL TOTAL CA: CPT | Performed by: INTERNAL MEDICINE

## 2020-09-28 PROCEDURE — 71045 X-RAY EXAM CHEST 1 VIEW: CPT

## 2020-09-28 PROCEDURE — 71046 X-RAY EXAM CHEST 2 VIEWS: CPT

## 2020-09-28 RX ORDER — IPRATROPIUM BROMIDE AND ALBUTEROL SULFATE 2.5; .5 MG/3ML; MG/3ML
3 SOLUTION RESPIRATORY (INHALATION)
Status: DISCONTINUED | OUTPATIENT
Start: 2020-09-28 | End: 2020-10-01

## 2020-09-28 RX ORDER — KETOROLAC TROMETHAMINE 30 MG/ML
30 INJECTION, SOLUTION INTRAMUSCULAR; INTRAVENOUS ONCE
Status: DISCONTINUED | OUTPATIENT
Start: 2020-09-28 | End: 2020-10-01 | Stop reason: HOSPADM

## 2020-09-28 RX ORDER — KETOROLAC TROMETHAMINE 15 MG/ML
15 INJECTION, SOLUTION INTRAMUSCULAR; INTRAVENOUS EVERY 6 HOURS
Status: COMPLETED | OUTPATIENT
Start: 2020-09-28 | End: 2020-09-29

## 2020-09-28 RX ADMIN — IPRATROPIUM BROMIDE AND ALBUTEROL SULFATE 3 ML: 2.5; .5 SOLUTION RESPIRATORY (INHALATION) at 12:20

## 2020-09-28 RX ADMIN — IPRATROPIUM BROMIDE AND ALBUTEROL SULFATE 3 ML: 2.5; .5 SOLUTION RESPIRATORY (INHALATION) at 20:17

## 2020-09-28 RX ADMIN — DOCUSATE SODIUM 50MG AND SENNOSIDES 8.6MG 2 TABLET: 8.6; 5 TABLET, FILM COATED ORAL at 08:34

## 2020-09-28 RX ADMIN — HYDROCODONE BITARTRATE AND ACETAMINOPHEN 1 TABLET: 7.5; 325 TABLET ORAL at 05:57

## 2020-09-28 RX ADMIN — IPRATROPIUM BROMIDE AND ALBUTEROL SULFATE 3 ML: 2.5; .5 SOLUTION RESPIRATORY (INHALATION) at 15:36

## 2020-09-28 RX ADMIN — HEPARIN SODIUM 5000 UNITS: 5000 INJECTION INTRAVENOUS; SUBCUTANEOUS at 20:05

## 2020-09-28 RX ADMIN — MORPHINE SULFATE 2 MG: 4 INJECTION, SOLUTION INTRAMUSCULAR; INTRAVENOUS at 08:33

## 2020-09-28 RX ADMIN — HEPARIN SODIUM 5000 UNITS: 5000 INJECTION INTRAVENOUS; SUBCUTANEOUS at 16:24

## 2020-09-28 RX ADMIN — ASPIRIN 81 MG: 81 TABLET, COATED ORAL at 08:34

## 2020-09-28 RX ADMIN — TAMSULOSIN HYDROCHLORIDE 0.4 MG: 0.4 CAPSULE ORAL at 08:34

## 2020-09-28 RX ADMIN — MORPHINE SULFATE 2 MG: 4 INJECTION, SOLUTION INTRAMUSCULAR; INTRAVENOUS at 20:06

## 2020-09-28 RX ADMIN — PANTOPRAZOLE SODIUM 40 MG: 40 TABLET, DELAYED RELEASE ORAL at 08:34

## 2020-09-28 RX ADMIN — CARVEDILOL 3.12 MG: 3.12 TABLET, FILM COATED ORAL at 18:37

## 2020-09-28 RX ADMIN — HYDROCODONE BITARTRATE AND ACETAMINOPHEN 1 TABLET: 7.5; 325 TABLET ORAL at 10:59

## 2020-09-28 RX ADMIN — KETOROLAC TROMETHAMINE 30 MG: 30 INJECTION, SOLUTION INTRAMUSCULAR at 05:56

## 2020-09-28 RX ADMIN — SODIUM CHLORIDE, PRESERVATIVE FREE 10 ML: 5 INJECTION INTRAVENOUS at 20:06

## 2020-09-28 RX ADMIN — HEPARIN SODIUM 5000 UNITS: 5000 INJECTION INTRAVENOUS; SUBCUTANEOUS at 08:34

## 2020-09-28 RX ADMIN — MORPHINE SULFATE 2 MG: 4 INJECTION, SOLUTION INTRAMUSCULAR; INTRAVENOUS at 13:23

## 2020-09-28 RX ADMIN — HYDROCODONE BITARTRATE AND ACETAMINOPHEN 1 TABLET: 7.5; 325 TABLET ORAL at 20:06

## 2020-09-28 RX ADMIN — CARVEDILOL 3.12 MG: 3.12 TABLET, FILM COATED ORAL at 08:34

## 2020-09-28 RX ADMIN — KETOROLAC TROMETHAMINE 15 MG: 15 INJECTION, SOLUTION INTRAMUSCULAR; INTRAVENOUS at 18:37

## 2020-09-28 RX ADMIN — HYDROCODONE BITARTRATE AND ACETAMINOPHEN 1 TABLET: 7.5; 325 TABLET ORAL at 02:16

## 2020-09-29 ENCOUNTER — RESEARCH ENCOUNTER (OUTPATIENT)
Dept: ONCOLOGY | Facility: CLINIC | Age: 60
End: 2020-09-29

## 2020-09-29 LAB
ANION GAP SERPL CALCULATED.3IONS-SCNC: 11 MMOL/L (ref 5–15)
BASOPHILS # BLD AUTO: 0.07 10*3/MM3 (ref 0–0.2)
BASOPHILS NFR BLD AUTO: 1 % (ref 0–1.5)
BUN SERPL-MCNC: 22 MG/DL (ref 8–23)
BUN/CREAT SERPL: 29.7 (ref 7–25)
CALCIUM SPEC-SCNC: 8.6 MG/DL (ref 8.6–10.5)
CHLORIDE SERPL-SCNC: 102 MMOL/L (ref 98–107)
CO2 SERPL-SCNC: 28 MMOL/L (ref 22–29)
CREAT SERPL-MCNC: 0.74 MG/DL (ref 0.76–1.27)
DEPRECATED RDW RBC AUTO: 43.8 FL (ref 37–54)
EOSINOPHIL # BLD AUTO: 0.18 10*3/MM3 (ref 0–0.4)
EOSINOPHIL NFR BLD AUTO: 2.5 % (ref 0.3–6.2)
ERYTHROCYTE [DISTWIDTH] IN BLOOD BY AUTOMATED COUNT: 13 % (ref 12.3–15.4)
GFR SERPL CREATININE-BSD FRML MDRD: 108 ML/MIN/1.73
GLUCOSE BLDC GLUCOMTR-MCNC: 122 MG/DL (ref 70–130)
GLUCOSE BLDC GLUCOMTR-MCNC: 140 MG/DL (ref 70–130)
GLUCOSE BLDC GLUCOMTR-MCNC: 141 MG/DL (ref 70–130)
GLUCOSE BLDC GLUCOMTR-MCNC: 98 MG/DL (ref 70–130)
GLUCOSE SERPL-MCNC: 104 MG/DL (ref 65–99)
HCT VFR BLD AUTO: 35.8 % (ref 37.5–51)
HGB BLD-MCNC: 11.6 G/DL (ref 13–17.7)
IMM GRANULOCYTES # BLD AUTO: 0.14 10*3/MM3 (ref 0–0.05)
IMM GRANULOCYTES NFR BLD AUTO: 2 % (ref 0–0.5)
LYMPHOCYTES # BLD AUTO: 1.94 10*3/MM3 (ref 0.7–3.1)
LYMPHOCYTES NFR BLD AUTO: 27.4 % (ref 19.6–45.3)
MCH RBC QN AUTO: 30.4 PG (ref 26.6–33)
MCHC RBC AUTO-ENTMCNC: 32.4 G/DL (ref 31.5–35.7)
MCV RBC AUTO: 93.7 FL (ref 79–97)
MONOCYTES # BLD AUTO: 0.82 10*3/MM3 (ref 0.1–0.9)
MONOCYTES NFR BLD AUTO: 11.6 % (ref 5–12)
NEUTROPHILS NFR BLD AUTO: 3.92 10*3/MM3 (ref 1.7–7)
NEUTROPHILS NFR BLD AUTO: 55.5 % (ref 42.7–76)
NRBC BLD AUTO-RTO: 0 /100 WBC (ref 0–0.2)
PLATELET # BLD AUTO: 230 10*3/MM3 (ref 140–450)
PMV BLD AUTO: 10 FL (ref 6–12)
POTASSIUM SERPL-SCNC: 3.9 MMOL/L (ref 3.5–5.2)
RBC # BLD AUTO: 3.82 10*6/MM3 (ref 4.14–5.8)
SODIUM SERPL-SCNC: 141 MMOL/L (ref 136–145)
WBC # BLD AUTO: 7.07 10*3/MM3 (ref 3.4–10.8)

## 2020-09-29 PROCEDURE — 85025 COMPLETE CBC W/AUTO DIFF WBC: CPT | Performed by: INTERNAL MEDICINE

## 2020-09-29 PROCEDURE — 94799 UNLISTED PULMONARY SVC/PX: CPT

## 2020-09-29 PROCEDURE — 80048 BASIC METABOLIC PNL TOTAL CA: CPT | Performed by: INTERNAL MEDICINE

## 2020-09-29 PROCEDURE — 25010000002 KETOROLAC TROMETHAMINE PER 15 MG: Performed by: INTERNAL MEDICINE

## 2020-09-29 PROCEDURE — 25010000002 FUROSEMIDE PER 20 MG: Performed by: PHYSICIAN ASSISTANT

## 2020-09-29 PROCEDURE — 82962 GLUCOSE BLOOD TEST: CPT

## 2020-09-29 PROCEDURE — 25010000002 MORPHINE PER 10 MG: Performed by: PHYSICIAN ASSISTANT

## 2020-09-29 PROCEDURE — 25010000002 HEPARIN (PORCINE) PER 1000 UNITS: Performed by: PHYSICIAN ASSISTANT

## 2020-09-29 PROCEDURE — 99024 POSTOP FOLLOW-UP VISIT: CPT | Performed by: THORACIC SURGERY (CARDIOTHORACIC VASCULAR SURGERY)

## 2020-09-29 RX ORDER — HYDROCODONE BITARTRATE AND ACETAMINOPHEN 7.5; 325 MG/1; MG/1
1 TABLET ORAL EVERY 6 HOURS PRN
Qty: 24 TABLET | Refills: 0 | Status: SHIPPED | OUTPATIENT
Start: 2020-09-29 | End: 2020-09-30 | Stop reason: HOSPADM

## 2020-09-29 RX ORDER — FUROSEMIDE 10 MG/ML
40 INJECTION INTRAMUSCULAR; INTRAVENOUS ONCE
Status: COMPLETED | OUTPATIENT
Start: 2020-09-29 | End: 2020-09-29

## 2020-09-29 RX ADMIN — KETOROLAC TROMETHAMINE 15 MG: 15 INJECTION, SOLUTION INTRAMUSCULAR; INTRAVENOUS at 06:34

## 2020-09-29 RX ADMIN — CARVEDILOL 3.12 MG: 3.12 TABLET, FILM COATED ORAL at 08:37

## 2020-09-29 RX ADMIN — HEPARIN SODIUM 5000 UNITS: 5000 INJECTION INTRAVENOUS; SUBCUTANEOUS at 14:30

## 2020-09-29 RX ADMIN — ASPIRIN 81 MG: 81 TABLET, COATED ORAL at 08:36

## 2020-09-29 RX ADMIN — HYDROCODONE BITARTRATE AND ACETAMINOPHEN 1 TABLET: 7.5; 325 TABLET ORAL at 14:30

## 2020-09-29 RX ADMIN — DOCUSATE SODIUM 50MG AND SENNOSIDES 8.6MG 2 TABLET: 8.6; 5 TABLET, FILM COATED ORAL at 08:37

## 2020-09-29 RX ADMIN — IPRATROPIUM BROMIDE AND ALBUTEROL SULFATE 3 ML: 2.5; .5 SOLUTION RESPIRATORY (INHALATION) at 16:21

## 2020-09-29 RX ADMIN — PANTOPRAZOLE SODIUM 40 MG: 40 TABLET, DELAYED RELEASE ORAL at 08:37

## 2020-09-29 RX ADMIN — SODIUM CHLORIDE, PRESERVATIVE FREE 10 ML: 5 INJECTION INTRAVENOUS at 21:02

## 2020-09-29 RX ADMIN — IPRATROPIUM BROMIDE AND ALBUTEROL SULFATE 3 ML: 2.5; .5 SOLUTION RESPIRATORY (INHALATION) at 03:47

## 2020-09-29 RX ADMIN — HYDROCODONE BITARTRATE AND ACETAMINOPHEN 1 TABLET: 7.5; 325 TABLET ORAL at 21:01

## 2020-09-29 RX ADMIN — SODIUM CHLORIDE, PRESERVATIVE FREE 10 ML: 5 INJECTION INTRAVENOUS at 08:38

## 2020-09-29 RX ADMIN — IPRATROPIUM BROMIDE AND ALBUTEROL SULFATE 3 ML: 2.5; .5 SOLUTION RESPIRATORY (INHALATION) at 23:16

## 2020-09-29 RX ADMIN — KETOROLAC TROMETHAMINE 15 MG: 15 INJECTION, SOLUTION INTRAMUSCULAR; INTRAVENOUS at 00:04

## 2020-09-29 RX ADMIN — CARVEDILOL 3.12 MG: 3.12 TABLET, FILM COATED ORAL at 17:17

## 2020-09-29 RX ADMIN — HYDROCODONE BITARTRATE AND ACETAMINOPHEN 1 TABLET: 7.5; 325 TABLET ORAL at 04:26

## 2020-09-29 RX ADMIN — FUROSEMIDE 40 MG: 10 INJECTION, SOLUTION INTRAMUSCULAR; INTRAVENOUS at 09:58

## 2020-09-29 RX ADMIN — DOCUSATE SODIUM 50MG AND SENNOSIDES 8.6MG 2 TABLET: 8.6; 5 TABLET, FILM COATED ORAL at 21:01

## 2020-09-29 RX ADMIN — IPRATROPIUM BROMIDE AND ALBUTEROL SULFATE 3 ML: 2.5; .5 SOLUTION RESPIRATORY (INHALATION) at 12:23

## 2020-09-29 RX ADMIN — HEPARIN SODIUM 5000 UNITS: 5000 INJECTION INTRAVENOUS; SUBCUTANEOUS at 21:01

## 2020-09-29 RX ADMIN — IPRATROPIUM BROMIDE AND ALBUTEROL SULFATE 3 ML: 2.5; .5 SOLUTION RESPIRATORY (INHALATION) at 19:32

## 2020-09-29 RX ADMIN — TAMSULOSIN HYDROCHLORIDE 0.4 MG: 0.4 CAPSULE ORAL at 08:37

## 2020-09-29 RX ADMIN — MORPHINE SULFATE 2 MG: 4 INJECTION, SOLUTION INTRAMUSCULAR; INTRAVENOUS at 04:26

## 2020-09-29 RX ADMIN — IPRATROPIUM BROMIDE AND ALBUTEROL SULFATE 3 ML: 2.5; .5 SOLUTION RESPIRATORY (INHALATION) at 07:16

## 2020-09-29 RX ADMIN — POLYETHYLENE GLYCOL 3350 17 G: 17 POWDER, FOR SOLUTION ORAL at 08:38

## 2020-09-29 RX ADMIN — HYDROCODONE BITARTRATE AND ACETAMINOPHEN 1 TABLET: 7.5; 325 TABLET ORAL at 08:47

## 2020-09-29 NOTE — RESEARCH
At the request of Dr. Haro, I went to see Mr. Morley in [address].  I checked with his nurse, Barb, before I went to his room.  Upon entering his room, he had family at bedside and was sitting up in his chair.      The patient was alert and oriented and assessed to be capable of discussing the clinical trial opportunity.  We had a very pleasant discussion about him participating in the ALCHEMIST tissue submission clinical trial.      I reviewed the eight elements of the informed consent with him and his family member.  He was given time to ask questions.  At the conclusion of our discussion, he agreed to participate in the study.  He signed the main consent and agreed to allow his tissue to be kept in the Biobank for future testing.  He also agreed to allowing research staff call him and leave VM on his phone.      Following the consenting process, I made a copy of his consent and provided this to him with my card so he can contact me if he has further questions.  He agreed to answer the study-required questionnaires at this time as well.  We went through the questionnaires together.      Following this, the patient was left sitting up in his chair and his call-light was within reach.  I asked if he needed anything before we left.  He said no.  I reminded him to call me if he has any questions/concerns in the interim.      I will get him enrolled to the main ALCHEMIST study and send his tissue off.  I will then see him when he returns to see Dr. Haro next week. At this time, we will discuss the treatment study options under the [id number] sub-study.     Inez Houser RN

## 2020-09-30 ENCOUNTER — APPOINTMENT (OUTPATIENT)
Dept: GENERAL RADIOLOGY | Facility: HOSPITAL | Age: 60
End: 2020-09-30

## 2020-09-30 LAB
GLUCOSE BLDC GLUCOMTR-MCNC: 107 MG/DL (ref 70–130)
GLUCOSE BLDC GLUCOMTR-MCNC: 110 MG/DL (ref 70–130)
GLUCOSE BLDC GLUCOMTR-MCNC: 115 MG/DL (ref 70–130)
GLUCOSE BLDC GLUCOMTR-MCNC: 124 MG/DL (ref 70–130)

## 2020-09-30 PROCEDURE — 94799 UNLISTED PULMONARY SVC/PX: CPT

## 2020-09-30 PROCEDURE — 82962 GLUCOSE BLOOD TEST: CPT

## 2020-09-30 PROCEDURE — 71045 X-RAY EXAM CHEST 1 VIEW: CPT

## 2020-09-30 PROCEDURE — 99024 POSTOP FOLLOW-UP VISIT: CPT | Performed by: THORACIC SURGERY (CARDIOTHORACIC VASCULAR SURGERY)

## 2020-09-30 PROCEDURE — 25010000002 HEPARIN (PORCINE) PER 1000 UNITS: Performed by: PHYSICIAN ASSISTANT

## 2020-09-30 RX ORDER — HYDROCODONE BITARTRATE AND ACETAMINOPHEN 5; 325 MG/1; MG/1
1 TABLET ORAL EVERY 6 HOURS PRN
Status: DISCONTINUED | OUTPATIENT
Start: 2020-09-30 | End: 2020-10-01 | Stop reason: HOSPADM

## 2020-09-30 RX ORDER — HYDROCODONE BITARTRATE AND ACETAMINOPHEN 5; 325 MG/1; MG/1
1 TABLET ORAL EVERY 8 HOURS PRN
Qty: 30 TABLET | Refills: 0 | Status: SHIPPED | OUTPATIENT
Start: 2020-09-30 | End: 2022-05-04

## 2020-09-30 RX ADMIN — CARVEDILOL 3.12 MG: 3.12 TABLET, FILM COATED ORAL at 17:56

## 2020-09-30 RX ADMIN — DOCUSATE SODIUM 50MG AND SENNOSIDES 8.6MG 2 TABLET: 8.6; 5 TABLET, FILM COATED ORAL at 08:56

## 2020-09-30 RX ADMIN — HYDROCODONE BITARTRATE AND ACETAMINOPHEN 1 TABLET: 5; 325 TABLET ORAL at 18:18

## 2020-09-30 RX ADMIN — HEPARIN SODIUM 5000 UNITS: 5000 INJECTION INTRAVENOUS; SUBCUTANEOUS at 21:19

## 2020-09-30 RX ADMIN — HYDROCODONE BITARTRATE AND ACETAMINOPHEN 1 TABLET: 5; 325 TABLET ORAL at 12:58

## 2020-09-30 RX ADMIN — HYDROCODONE BITARTRATE AND ACETAMINOPHEN 1 TABLET: 7.5; 325 TABLET ORAL at 05:20

## 2020-09-30 RX ADMIN — IPRATROPIUM BROMIDE AND ALBUTEROL SULFATE 3 ML: 2.5; .5 SOLUTION RESPIRATORY (INHALATION) at 10:57

## 2020-09-30 RX ADMIN — ASPIRIN 81 MG: 81 TABLET, COATED ORAL at 08:56

## 2020-09-30 RX ADMIN — IPRATROPIUM BROMIDE AND ALBUTEROL SULFATE 3 ML: 2.5; .5 SOLUTION RESPIRATORY (INHALATION) at 18:47

## 2020-09-30 RX ADMIN — IPRATROPIUM BROMIDE AND ALBUTEROL SULFATE 3 ML: 2.5; .5 SOLUTION RESPIRATORY (INHALATION) at 02:56

## 2020-09-30 RX ADMIN — IPRATROPIUM BROMIDE AND ALBUTEROL SULFATE 3 ML: 2.5; .5 SOLUTION RESPIRATORY (INHALATION) at 23:00

## 2020-09-30 RX ADMIN — SODIUM CHLORIDE, PRESERVATIVE FREE 10 ML: 5 INJECTION INTRAVENOUS at 21:18

## 2020-09-30 RX ADMIN — CARVEDILOL 3.12 MG: 3.12 TABLET, FILM COATED ORAL at 08:56

## 2020-09-30 RX ADMIN — PANTOPRAZOLE SODIUM 40 MG: 40 TABLET, DELAYED RELEASE ORAL at 08:56

## 2020-09-30 RX ADMIN — TAMSULOSIN HYDROCHLORIDE 0.4 MG: 0.4 CAPSULE ORAL at 08:56

## 2020-09-30 RX ADMIN — POLYETHYLENE GLYCOL 3350 17 G: 17 POWDER, FOR SOLUTION ORAL at 08:56

## 2020-09-30 RX ADMIN — HEPARIN SODIUM 5000 UNITS: 5000 INJECTION INTRAVENOUS; SUBCUTANEOUS at 05:20

## 2020-09-30 RX ADMIN — SODIUM CHLORIDE, PRESERVATIVE FREE 10 ML: 5 INJECTION INTRAVENOUS at 09:08

## 2020-09-30 RX ADMIN — IPRATROPIUM BROMIDE AND ALBUTEROL SULFATE 3 ML: 2.5; .5 SOLUTION RESPIRATORY (INHALATION) at 14:51

## 2020-09-30 RX ADMIN — IPRATROPIUM BROMIDE AND ALBUTEROL SULFATE 3 ML: 2.5; .5 SOLUTION RESPIRATORY (INHALATION) at 06:57

## 2020-10-01 ENCOUNTER — APPOINTMENT (OUTPATIENT)
Dept: GENERAL RADIOLOGY | Facility: HOSPITAL | Age: 60
End: 2020-10-01

## 2020-10-01 VITALS
WEIGHT: 256.84 LBS | RESPIRATION RATE: 16 BRPM | HEIGHT: 70 IN | DIASTOLIC BLOOD PRESSURE: 77 MMHG | HEART RATE: 76 BPM | SYSTOLIC BLOOD PRESSURE: 163 MMHG | OXYGEN SATURATION: 94 % | BODY MASS INDEX: 36.77 KG/M2 | TEMPERATURE: 98.3 F

## 2020-10-01 LAB — GLUCOSE BLDC GLUCOMTR-MCNC: 117 MG/DL (ref 70–130)

## 2020-10-01 PROCEDURE — 71045 X-RAY EXAM CHEST 1 VIEW: CPT

## 2020-10-01 PROCEDURE — 99024 POSTOP FOLLOW-UP VISIT: CPT | Performed by: THORACIC SURGERY (CARDIOTHORACIC VASCULAR SURGERY)

## 2020-10-01 PROCEDURE — 94799 UNLISTED PULMONARY SVC/PX: CPT

## 2020-10-01 PROCEDURE — 82962 GLUCOSE BLOOD TEST: CPT

## 2020-10-01 PROCEDURE — 25010000002 HEPARIN (PORCINE) PER 1000 UNITS: Performed by: PHYSICIAN ASSISTANT

## 2020-10-01 RX ORDER — TAMSULOSIN HYDROCHLORIDE 0.4 MG/1
0.4 CAPSULE ORAL DAILY
Qty: 30 CAPSULE | Refills: 0 | Status: SHIPPED | OUTPATIENT
Start: 2020-10-02 | End: 2022-05-04 | Stop reason: ALTCHOICE

## 2020-10-01 RX ORDER — IPRATROPIUM BROMIDE AND ALBUTEROL SULFATE 2.5; .5 MG/3ML; MG/3ML
3 SOLUTION RESPIRATORY (INHALATION) EVERY 4 HOURS PRN
Status: DISCONTINUED | OUTPATIENT
Start: 2020-10-01 | End: 2020-10-01 | Stop reason: HOSPADM

## 2020-10-01 RX ADMIN — PANTOPRAZOLE SODIUM 40 MG: 40 TABLET, DELAYED RELEASE ORAL at 08:37

## 2020-10-01 RX ADMIN — ASPIRIN 81 MG: 81 TABLET, COATED ORAL at 08:37

## 2020-10-01 RX ADMIN — IPRATROPIUM BROMIDE AND ALBUTEROL SULFATE 3 ML: 2.5; .5 SOLUTION RESPIRATORY (INHALATION) at 02:38

## 2020-10-01 RX ADMIN — DOCUSATE SODIUM 50MG AND SENNOSIDES 8.6MG 2 TABLET: 8.6; 5 TABLET, FILM COATED ORAL at 08:37

## 2020-10-01 RX ADMIN — TAMSULOSIN HYDROCHLORIDE 0.4 MG: 0.4 CAPSULE ORAL at 08:37

## 2020-10-01 RX ADMIN — SODIUM CHLORIDE, PRESERVATIVE FREE 10 ML: 5 INJECTION INTRAVENOUS at 08:39

## 2020-10-01 RX ADMIN — HYDROCODONE BITARTRATE AND ACETAMINOPHEN 1 TABLET: 5; 325 TABLET ORAL at 00:36

## 2020-10-01 RX ADMIN — HYDROCODONE BITARTRATE AND ACETAMINOPHEN 1 TABLET: 5; 325 TABLET ORAL at 07:03

## 2020-10-01 RX ADMIN — CARVEDILOL 3.12 MG: 3.12 TABLET, FILM COATED ORAL at 08:37

## 2020-10-01 RX ADMIN — POLYETHYLENE GLYCOL 3350 17 G: 17 POWDER, FOR SOLUTION ORAL at 08:39

## 2020-10-01 RX ADMIN — HEPARIN SODIUM 5000 UNITS: 5000 INJECTION INTRAVENOUS; SUBCUTANEOUS at 07:03

## 2020-10-01 NOTE — PLAN OF CARE
Goal Outcome Evaluation:  Plan of Care Reviewed With: patient, daughter  Progress: improving     VSS on room air. PRN pain medication given once. Sleeping between care. Walked 3 times. Pt ready for discharge today. No signs of acute distress.

## 2020-10-01 NOTE — NURSING NOTE
Prior to central line removal, order for the removal of catheter was verified, patient was assessed, necessary materials were gathered and patient was educated regarding procedure .    Patient was positioned supine to ensure that the insertion site was at or below the level of the heart.    Hands were washed, clean gloves were applied and central line dressing was gently removed. Catheter exit site was not cultured.     A new pair of clean gloves were then applied. Insertion site was cleansed with 2% Chlorhexidine swab using a circular motion beginning at the insertion site and moving outward for 30 seconds and allowed to dry.     Clamp on line was present and clamped.     Patient was instructed to hold breath as catheter was withdrawn.     The central line was grasped at the insertion site and slowly pulled outward parallel to the skin. Resistance was not met.    After central line was completely removed, a sterile 4x4 gauze pad was used to apply light pressure until bleeding stopped. At that time, petroleum-based gauze and a sterile occlusive dressing was applied to exit site.     Patient was instructed to keep dressing in place for at least 24 hours and to remain in a flat or reclining position for 30 minutes post-removal.     Catheter was inspected after removal and was intact. Tip of central line was not sent for culture. Patient tolerated procedure.      PICC removed per protocol on 10/1/20 @ 1055 by Fatoumata Gilbert RN

## 2020-10-01 NOTE — PROGRESS NOTES
Case Management Discharge Note      Final Note: Plan remains home with family to assist. Referral to [facility] for rolling walker and brandi Gaviria, at [facility], they will deliver rolling walker to pt's room prior to discharge today.  Family will transport pt home. Pt denies further discharge needs.         Selected Continued Care - Discharged on 10/1/2020 Admission date: 9/23/2020 - Discharge disposition: Home or Self Care    Destination    No services have been selected for the patient.              Durable Medical Equipment Coordination complete    Service Provider Selected Services Address Phone Fax    [facility]  Durable Medical Equipment [address] [place] [id number] [phone]          Dialysis/Infusion    No services have been selected for the patient.              Home Medical Care    No services have been selected for the patient.              Therapy    No services have been selected for the patient.              Community Resources    No services have been selected for the patient.                       Final Discharge Disposition Code: 01 - home or self-care

## 2020-10-01 NOTE — PAYOR COMM NOTE
"Ariana Sepulveda RN  Utilization Review  P: [phone]  F: [phone]    Ref # [id number]  DC date = 10/1/2020    America Morley (60 y.o. Male)     Date of Birth Social Security Number Address Home Phone MRN    1960  [address]   [place] [address] [phone] [id number]    Sabianist Marital Status          Christianity        Admission Date Admission Type Admitting Provider Attending Provider Department, Room/Bed    9/23/20 Elective Luis Booth MD  [address], [address]    Discharge Date Discharge Disposition Discharge Destination        10/1/2020 Home or Self Care              Attending Provider: (none)   Allergies: No Known Allergies    Isolation: None   Infection: None   Code Status: Prior    Ht: 177.8 cm (70\")   Wt: 116 kg (256 lb 13.4 oz)    Admission Cmt: None   Principal Problem: 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]                 Active Insurance as of 9/23/2020     Primary Coverage     Payor Plan Insurance Group Employer/Plan Group    ANTHEM BLUE CROSS ANTHEM BLUE CROSS BLUE SHIELD PPO [id number]     Payor Plan Address Payor Plan Phone Number Payor Plan Fax Number Effective Dates    [address] [phone]  1/1/2016 - None Entered    [place] [address]       Subscriber Name Subscriber Birth Date Member ID       AMERICA MORLEY 1960 [id number]                 Emergency Contacts      (Rel.) Home Phone Work Phone Mobile Phone    Alina Hager (Daughter) -- -- [phone]               Physician Progress Notes (last 24 hours) (Notes from 09/30/20 1636 through 10/01/20 1636)      Luis Booth MD at 10/01/20 0830          CTS Progress Note       LOS: 8 days   Patient Care Team:  Siddharth Godwin MD as PCP - General (Family Medicine)  Asaf Lobato MD as Consulting Physician (Pulmonary Disease)  Janes Rosen MD as Consulting Physician (Cardiology)  Luis Booth" MD ANTHONY as Surgeon (Cardiothoracic Surgery)  Daniel Haro MD as Consulting Physician (Hematology and Oncology)    Chief Complaint: Malignant neoplasm of upper lobe of left lung (CMS/HCC)    Vital Signs:  Temp:  [97.8 °F (36.6 °C)-98.3 °F (36.8 °C)] 98.3 °F (36.8 °C)  Heart Rate:  [50-87] 62  Resp:  [14-18] 16  BP: (130-161)/(70-86) 156/79    Physical Exam: Ambulatory and breathing unlabored on room air       Results:   Results from last 7 days   Lab Units 09/29/20  0501   WBC 10*3/mm3 7.07   HEMOGLOBIN g/dL 11.6*   HEMATOCRIT % 35.8*   PLATELETS 10*3/mm3 230     Results from last 7 days   Lab Units 09/29/20  0501   SODIUM mmol/L 141   POTASSIUM mmol/L 3.9   CHLORIDE mmol/L 102   CO2 mmol/L 28.0   BUN mg/dL 22   CREATININE mg/dL 0.74*   GLUCOSE mg/dL 104*   CALCIUM mg/dL 8.6           Imaging Results (Last 24 Hours)     Procedure Component Value Units Date/Time    XR Chest 1 View [[id number]] Collected: 10/01/20 0825     Updated: 10/01/20 0829    Narrative:      EXAMINATION: XR CHEST 1 VW-     INDICATION: post op  Left upper lobectomy.     COMPARISON: 09/30/2020     FINDINGS: Hazy interstitial opacity left base is little increased. Right  lung remains clear. No pneumothorax is seen. PICC line remains the  distal SVC.          Impression:      Mildly increased interstitial disease the left lung compared  to yesterday's exam. Right lung remains clear.           XR Chest 1 View [[id number]] Collected: 09/30/20 0808     Updated: 09/30/20 1558    Narrative:      EXAMINATION: XR CHEST 1 VW-      INDICATION: Postop left upper lobectomy.      COMPARISON: Chest x-ray 09/28/2020.     FINDINGS: Left chest tubes have been removed with opacifications of  atelectasis and/or scarring along with intermixed postsurgical change at  the left lung base. No pneumothorax or pleural effusion.  Right arm PICC  in place terminating at the cavoatrial junction.       Impression:      Left chest tubes have been removed with opacifications  of  atelectasis and/or scarring along with intermixed postsurgical change at  the left lung base.      D:  09/30/2020  E:  09/30/2020     This report was finalized on 9/30/2020 3:54 PM by Dr. Ja Chambers.             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  [place] 5/325 1 p.o. every 8 hours as needed pain.  Number 30 tablets.  Follow-up my office in 2 weeks with a chest x-ray    Please note that portions of this note were completed with a voice recognition program. Efforts were made to edit the dictations, but occasionally words are mistranscribed.    Luis Booth MD  10/01/20  08:30 EDT        Electronically signed by Luis Booth MD at 10/01/20 0831       Consult Notes (last 24 hours) (Notes from 09/30/20 1636 through 10/01/20 1636)    No notes of this type exist for this encounter.

## 2020-10-01 NOTE — PROGRESS NOTES
CTS Progress Note       LOS: 8 days   Patient Care Team:  Siddharth Godwin MD as PCP - General (Family Medicine)  Asaf Lobato MD as Consulting Physician (Pulmonary Disease)  Janes Rosen MD as Consulting Physician (Cardiology)  Luis Booth MD as Surgeon (Cardiothoracic Surgery)  Daniel Haro MD as Consulting Physician (Hematology and Oncology)    Chief Complaint: Malignant neoplasm of upper lobe of left lung (CMS/HCC)    Vital Signs:  Temp:  [97.8 °F (36.6 °C)-98.3 °F (36.8 °C)] 98.3 °F (36.8 °C)  Heart Rate:  [50-87] 62  Resp:  [14-18] 16  BP: (130-161)/(70-86) 156/79    Physical Exam: Ambulatory and breathing unlabored on room air       Results:   Results from last 7 days   Lab Units 09/29/20  0501   WBC 10*3/mm3 7.07   HEMOGLOBIN g/dL 11.6*   HEMATOCRIT % 35.8*   PLATELETS 10*3/mm3 230     Results from last 7 days   Lab Units 09/29/20  0501   SODIUM mmol/L 141   POTASSIUM mmol/L 3.9   CHLORIDE mmol/L 102   CO2 mmol/L 28.0   BUN mg/dL 22   CREATININE mg/dL 0.74*   GLUCOSE mg/dL 104*   CALCIUM mg/dL 8.6           Imaging Results (Last 24 Hours)     Procedure Component Value Units Date/Time    XR Chest 1 View [[id number]] Collected: 10/01/20 0825     Updated: 10/01/20 0829    Narrative:      EXAMINATION: XR CHEST 1 VW-     INDICATION: post op  Left upper lobectomy.     COMPARISON: 09/30/2020     FINDINGS: Hazy interstitial opacity left base is little increased. Right  lung remains clear. No pneumothorax is seen. PICC line remains the  distal SVC.          Impression:      Mildly increased interstitial disease the left lung compared  to yesterday's exam. Right lung remains clear.           XR Chest 1 View [[id number]] Collected: 09/30/20 0808     Updated: 09/30/20 1558    Narrative:      EXAMINATION: XR CHEST 1 VW-      INDICATION: Postop left upper lobectomy.      COMPARISON: Chest x-ray 09/28/2020.     FINDINGS: Left chest tubes have been removed with opacifications of  atelectasis  and/or scarring along with intermixed postsurgical change at  the left lung base. No pneumothorax or pleural effusion.  Right arm PICC  in place terminating at the cavoatrial junction.       Impression:      Left chest tubes have been removed with opacifications of  atelectasis and/or scarring along with intermixed postsurgical change at  the left lung base.      D:  09/30/2020  E:  09/30/2020     This report was finalized on 9/30/2020 3:54 PM by Dr. Ja Chambers.             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  [place] 5/325 1 p.o. every 8 hours as needed pain.  Number 30 tablets.  Follow-up my office in 2 weeks with a chest x-ray    Please note that portions of this note were completed with a voice recognition program. Efforts were made to edit the dictations, but occasionally words are mistranscribed.    Luis Booth MD  10/01/20  08:30 EDT

## 2020-10-02 ENCOUNTER — READMISSION MANAGEMENT (OUTPATIENT)
Dept: CALL CENTER | Facility: HOSPITAL | Age: 60
End: 2020-10-02

## 2020-10-02 ENCOUNTER — HOSPITAL ENCOUNTER (OUTPATIENT)
Facility: HOSPITAL | Age: 60
Setting detail: HOSPITAL OUTPATIENT SURGERY
End: 2020-10-02
Attending: THORACIC SURGERY (CARDIOTHORACIC VASCULAR SURGERY) | Admitting: THORACIC SURGERY (CARDIOTHORACIC VASCULAR SURGERY)

## 2020-10-02 DIAGNOSIS — C34.12 MALIGNANT NEOPLASM OF UPPER LOBE OF LEFT LUNG (HCC): Primary | ICD-10-CM

## 2020-10-02 NOTE — OUTREACH NOTE
Prep Survey      Responses   [facility] facility patient discharged from?  [place]   Is LACE score < 7 ?  No   Eligibility  Readm Mgmt   Discharge diagnosis  JUDITH lobectomy adenocarcinoma   Does the patient have one of the following disease processes/diagnoses(primary or secondary)?  General Surgery   Does the patient have Home health ordered?  No   Is there a DME ordered?  Yes   What DME was ordered?  [facility]   Comments regarding appointments  see AVS   Medication alerts for this patient  see AVS for changes   Prep survey completed?  Yes          Kinjal Reyna RN

## 2020-10-07 ENCOUNTER — READMISSION MANAGEMENT (OUTPATIENT)
Dept: CALL CENTER | Facility: HOSPITAL | Age: 60
End: 2020-10-07

## 2020-10-07 NOTE — OUTREACH NOTE
General Surgery Week 1 Survey      Responses   [facility] patient discharged from?  [place]   Does the patient have one of the following disease processes/diagnoses(primary or secondary)?  General Surgery   Week 1 attempt successful?  Yes   Call start time  1557   Call end time  1604   Discharge diagnosis  JUDITH lobectomy adenocarcinoma   Meds reviewed with patient/caregiver?  Yes   Is the patient having any side effects they believe may be caused by any medication additions or changes?  No   Does the patient have all medications related to this admission filled (includes all antibiotics, pain medications, etc.)  Yes   Is the patient taking all medications as directed (includes completed medication regime)?  Yes   Does the patient have a follow up appointment scheduled with their surgeon?  Yes   Has the patient kept scheduled appointments due by today?  N/A   Has home health visited the patient within 72 hours of discharge?  N/A   What DME was ordered?  [facility]   Has all DME been delivered?  Yes   Psychosocial issues?  No   Did the patient receive a copy of their discharge instructions?  Yes   Nursing interventions  Reviewed instructions with patient   What is the patient's perception of their health status since discharge?  Improving   Nursing interventions  Nurse provided patient education   Is the patient /caregiver able to teach back basic post-op care?  Lifting as instructed by MD in discharge instructions, Keep incision areas clean,dry and protected, Take showers only when approved by MD-sponge bathe until then   Is the patient/caregiver able to teach back signs and symptoms of incisional infection?  Increased redness, swelling or pain at the incisonal site, Increased drainage or bleeding, Incisional warmth, Pus or odor from incision, Fever   Is the patient/caregiver able to teach back steps to recovery at home?  Set small, achievable goals for return to baseline health, Rest and rebuild strength,  gradually increase activity, Eat a well-balance diet, Make a list of questions for surgeon's appointment   Is the patient/caregiver able to teach back the hierarchy of who to call/visit for symptoms/problems? PCP, Specialist, Home health nurse, Urgent Care, ED, 911  Yes   Additional teach back comments  Incision is slow to heal but he feels a bit better each day. Fewer coughs, encouraged a pillow.    Week 1 call completed?  Yes   Wrap up additional comments  Encouraged protein, says pain is worse with coughing.          Zakiya Verduzco RN

## 2020-10-08 ENCOUNTER — RESEARCH ENCOUNTER (OUTPATIENT)
Dept: ONCOLOGY | Facility: CLINIC | Age: 60
End: 2020-10-08

## 2020-10-08 ENCOUNTER — OFFICE VISIT (OUTPATIENT)
Dept: ONCOLOGY | Facility: CLINIC | Age: 60
End: 2020-10-08

## 2020-10-08 VITALS
SYSTOLIC BLOOD PRESSURE: 124 MMHG | WEIGHT: 238 LBS | HEIGHT: 70 IN | TEMPERATURE: 97.7 F | HEART RATE: 62 BPM | OXYGEN SATURATION: 98 % | DIASTOLIC BLOOD PRESSURE: 81 MMHG | BODY MASS INDEX: 34.07 KG/M2 | RESPIRATION RATE: 28 BRPM

## 2020-10-08 DIAGNOSIS — C34.12 MALIGNANT NEOPLASM OF UPPER LOBE OF LEFT LUNG (HCC): Primary | ICD-10-CM

## 2020-10-08 DIAGNOSIS — C34.12 MALIGNANT NEOPLASM OF UPPER LOBE OF LEFT LUNG (HCC): ICD-10-CM

## 2020-10-08 DIAGNOSIS — C34.92 ADENOCARCINOMA OF LEFT LUNG (HCC): Primary | Chronic | ICD-10-CM

## 2020-10-08 PROCEDURE — 99215 OFFICE O/P EST HI 40 MIN: CPT | Performed by: INTERNAL MEDICINE

## 2020-10-08 RX ORDER — LIDOCAINE AND PRILOCAINE 25; 25 MG/G; MG/G
CREAM TOPICAL AS NEEDED
Qty: 30 G | Refills: 3 | Status: SHIPPED | OUTPATIENT
Start: 2020-10-08 | End: 2022-05-04

## 2020-10-08 RX ORDER — FOLIC ACID 1 MG/1
1 TABLET ORAL DAILY
Qty: 30 TABLET | Refills: 5 | Status: SHIPPED | OUTPATIENT
Start: 2020-10-08 | End: 2022-10-05

## 2020-10-08 RX ORDER — CYANOCOBALAMIN 1000 UG/ML
1000 INJECTION, SOLUTION INTRAMUSCULAR; SUBCUTANEOUS ONCE
OUTPATIENT
Start: 2020-10-16

## 2020-10-08 RX ORDER — ONDANSETRON HYDROCHLORIDE 8 MG/1
8 TABLET, FILM COATED ORAL 3 TIMES DAILY PRN
Qty: 30 TABLET | Refills: 5 | Status: SHIPPED | OUTPATIENT
Start: 2020-10-08 | End: 2022-10-05

## 2020-10-08 RX ORDER — DEXAMETHASONE 4 MG/1
TABLET ORAL
Qty: 6 TABLET | Refills: 3 | Status: SHIPPED | OUTPATIENT
Start: 2020-10-08 | End: 2022-05-04

## 2020-10-08 NOTE — PROGRESS NOTES
CHIEF COMPLAINT: Stage IIb adenocarcinoma of the lung status post lobectomy    Problem List:  Oncology/Hematology History Overview Note   1.  Stage IIb T3 N0 M0 clinical and pathological stage adenocarcinoma of the lung versus metastasis from GI tract for tissue staining but no evidence of such on PET or on colonoscopy/EGD status post lobectomy 9/23/2020 with no molecular targets on Caris MI profile.  4 courses cisplatin Alimta recommended once heals from   2.  Hypertension  3.  Hyperlipidemia  4.  Coronary disease status post 8 stents with Dr. Rosen 2019 remains on dual antiplatelet therapy  5.  50-pack-year smoker persistent      -Summer 2020 presented to primary care with chronic cough.  -CT chest done at [facility] in [place] showed 6.5 cm left upper lobe mass with 2 noncalcified nodules in the right lung, 9 mm inferior right upper lobe, 8 mm right middle lobe, and scarring with fibrosis.  -7/31/2020 left upper lobe CT-guided lung biopsy showed adenocarcinoma with mucinous features diffuse positive for CK 7 and patchy positive for CK 20, moderately positive for CEA and CDX 2 and negative for TTF-1 and Napsin.  -8/10/2020 PET scan [facility] regional imaging shows left upper lobe mass SUV 11.3, 6.5 cm.  6 mm inferior right middle lobe nodule not PET avid.  MRI brain done at [facility] showed white matter changes but no metastasis.  -8/13/2020 [facility] initial consultation: There is still some uncertainty as to whether this is metastatic disease to the lung with a large metastasis and other smaller non-PET avid foci versus a lung primary with non-PET avid small lung nodules that may yet be metastatic but not hypermetabolic due to their small size versus a lung primary that should be resected and the other lesions are just benign and therefore not PET avid.  With dual antiplatelet therapy and multiple coronary vessel disease there will be significant operative risk  that will need to be addressed by Dr. Rosen.  Would recommend upper or lower GI endoscopy given the CDX 2 and the CK7 positivity for completeness sake especially since the TTF-1 is negative and yet this looks like an adenocarcinoma.  Per my discussion with Dr. Asaf Lobato, we will get him to Dr. Rosen for cardiac clearance and to determine whether he can come off any of his antiplatelet therapies, to gastroenterology for upper and lower GI endoscopy to be sure that there is no occult GI primary that has now metastasized to the lung, to Dr. Lobato's office for pulmonary function testing for adequacy of lung function, and we will have Alina Rica to present him at multidisciplinary lung conference in the next couple of weeks once this data is available to decide on our plan of attack and ultimately to Dr. Luis Booth for surgical resection as appropriate.  We will get his discs from outside PET and MRI brain downloaded into our system.      -8/25/2020 EGD and colonoscopy showed esophagitis.  Colonoscopy showed polyp with diverticulosis and hemorrhoids of the transverse and ascending colon.  -8/26/2020 pulmonary function test showed FEV1 of 2.51 L and DLCO 73% of predicted.  Patient communicated with Dr. Rosen who is just seeing him back in June who cleared him for surgery.  He can stop his Plavix 7 days before his procedures but cannot stop his aspirin.    -8/27/2020 [facility] follow-up visit: Presuming the pathology from the EGD and colonoscopy comes back benign and that all that was seen most just a polyp, and given that his pulmonary functions appear adequate, I have spoken with Dr. Steve Booth to consider resection of this and he will coordinate with Dr. Rosen the antiplatelet therapy and whether there needs to be cessation of such.  This will have less to do with surgery and more to do with postop epidural anesthesia risk and I will let Dr. Rosen and Dr. Booth negotiate that path.   I will follow-up the patient in a few weeks presumably postoperatively to discuss adjuvant therapy as indicated based on the pathological stage.  We will get Caris MI profile sent on his original biopsy so that we can have molecular testing to guide us if there is sufficient tissue.  If not we will send it on the resection specimen.  This is important as adjuvant therapy does not always entail chemotherapy.  As to the contralateral 2 small PET negative lung nodules, I suspect this is not metastatic but likely benign but it will need close attention on follow-up imaging postoperatively but I would give him the benefit of the doubt and will resect the obvious 6-1/2 cm lung mass if feasible.      -9/25/2020 inpatient consultation: Patient seen postoperatively.  Reviewed his negative Caris MI profile for actionable targets.  EGD and colonoscopy were negative.  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  Would recommend 4 courses of adjuvant cisplatin Alimta.  We will need a port placed by Dr. Booth.  Following that we will go to routine surveillance follow-up and will give attention to the other lung nodules which as mentioned above we are giving the benefit of the doubt and calling this M0 and went ahead with resection.    -10/8/2020 [facility] medical oncology follow-up visit: He is having significant postthoracotomy pain and we will get him in with palliative care.  We will see him back on October 16 for chemo education and B12 in preparation for October 23 cisplatin Alimta plus or minus Keytruda on a 3 arm alchemist trial.  The first arm is cisplatin Alimta followed by observation.  The second arm has cisplatin Alimta followed by Keytruda.  The third arm has cisplatin Alimta Keytruda followed by Keytruda.  He will be consented on October 23 but was provided information by my research assistant Inez Bennett today.  He will get to   [facility] for port.  He will get dexamethasone twice a day the day before day of and day after each Alimta dose.  He will start folic acid the day of his chemotherapy and stay on that throughout the chemo.  Risks including renal dysfunction, neuropathy, pancytopenia, mucositis, and additional side effects were covered in detail.       Abnormal stress test   9/23/2020 Surgery    Surgery       Left upper lobectomy pathology showed grade 2 mucinous adenocarcinoma, 6.7 cm, 0 out of 3 peribronchial nodes, 0 out of 1 level 9, 0 out of 1 level 6, 0 out of 1 level 7, 0 out of 1 level 8 nodes.     Adenocarcinoma of left lung (CMS/HCC)   8/27/2020 Initial Diagnosis    Adenocarcinoma of left lung (CMS/HCC)     8/27/2020 Cancer Staged    Staging form: Lung, AJCC 8th Edition  - Clinical: Stage IIB (cT3, cN0, cM0) - Signed by Daniel Haro MD on 8/27/2020 9/23/2020 Surgery    Surgery       Left upper lobectomy pathology showed grade 2 mucinous adenocarcinoma, 6.7 cm, 0 out of 3 peribronchial nodes, 0 out of 1 level 9, 0 out of 1 level 6, 0 out of 1 level 7, 0 out of 1 level 8 nodes.     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)   7/31/2020 -  Other Event    Caris MI profile limited sample negative for:  ALK  ROS 1  EGFR  BRAF    PDL 1 0%  Mismatch repair proficient     8/25/2020 Biopsy    Colonoscopy and EGD showed tubulovillous adenomas and chronic esophagitis but no GI tumor.     9/14/2020 Initial Diagnosis    Malignant neoplasm of upper lobe of left lung (CMS/HCC)     9/23/2020 Surgery    Surgery       Left upper lobectomy showed grade 2 mucinous adenocarcinoma   6.7 cm, 0 out of 3 peribronchial nodes, 0 out of 1 level 9 nodes, 0 out of 1 level 6 nodes, 0 out of 1 level 7 nodes, 0 out of 1 level 8 nodes    Pathological stage T3 N0 M0 stage IIb     9/25/2020  Cancer Staged    Staging form: Lung, AJCC 8th Edition  - Pathologic: Stage IIB (pT3, pN0, cM0) - Signed by Daniel Haro MD on 9/25/2020     10/23/2020 -  Chemotherapy    OP LUNG PEMEtrexed / CISplatin         HISTORY OF PRESENT ILLNESS:  The patient is a 60 y.o. male, here for follow up on management of adjuvant therapy lung cancer discussion.  Having significant postthoracotomy pain.      Past Medical History:   Diagnosis Date   • Anticoagulated    • Coronary artery disease    • Fractures    • GERD (gastroesophageal reflux disease)    • Heart murmur    • Hyperlipidemia    • Hypertension    • Lung mass     lung biopsy today 7/31/2020   • Prediabetes      Past Surgical History:   Procedure Laterality Date   • AMPUTATION FINGER / THUMB Right    • APPENDECTOMY     • BRONCHOSCOPY THORACOTOMY Left 9/23/2020    Procedure: BRONCHOSCOPY LEFT THORACOTOMY LEFT UPPER LOBECTOMY, MEDIASTINAL LYMPH NODE DISSECTION;  Surgeon: Luis Booth MD;  Location:  [facility];  Service: Cardiothoracic;  Laterality: Left;   • CARDIAC CATHETERIZATION     • CARDIAC CATHETERIZATION N/A 8/17/2018    Procedure: Left Heart Cath;  Surgeon: Janes Rosen MD;  Location:  [facility] CATH INVASIVE LOCATION;  Service: Cardiovascular   • COLONOSCOPY     • CORONARY STENT PLACEMENT      x8   • TIBIA FRACTURE SURGERY Bilateral    • TRIGGER FINGER RELEASE Bilateral        No Known Allergies    Family History and Social History reviewed and changed as necessary      REVIEW OF SYSTEM:   Review of Systems   Constitutional: Negative for appetite change, chills, diaphoresis, fatigue, fever and unexpected weight change.   HENT:   Negative for mouth sores, sore throat and trouble swallowing.    Eyes: Negative for icterus.   Respiratory: Negative for cough, hemoptysis and shortness of breath.    Cardiovascular: Negative for chest pain, leg swelling and palpitations.   Gastrointestinal: Negative for abdominal distention, abdominal pain, blood in stool,  "constipation, diarrhea, nausea and vomiting.   Endocrine: Negative for hot flashes.   Genitourinary: Negative for bladder incontinence, difficulty urinating, dysuria, frequency and hematuria.    Musculoskeletal: Negative for gait problem, neck pain and neck stiffness.   Skin: Negative for rash.   Neurological: Negative for dizziness, gait problem, headaches, light-headedness and numbness.   Hematological: Negative for adenopathy. Does not bruise/bleed easily.   Psychiatric/Behavioral: Negative for depression. The patient is not nervous/anxious.    All other systems reviewed and are negative.       PHYSICAL EXAM    Vitals:    10/08/20 0947   BP: 124/81   Pulse: 62   Resp: 28   Temp: 97.7 °F (36.5 °C)   TempSrc: Temporal   SpO2: 98%   Weight: 108 kg (238 lb)   Height: 177.8 cm (70\")     Vitals:    10/08/20 0947   PainSc:   8   PainLoc: Rib Cage        Constitutional: Appears well-developed and well-nourished. No distress.   ECOG: (1) Restricted in Physically Strenuous Activity, Ambulatory & Able to Do Work of Light Nature  HENT:   Head: Normocephalic.   Mouth/Throat: Oropharynx is clear and moist.   Eyes: Conjunctivae are normal. Pupils are equal, round, and reactive to light. No scleral icterus.   Neck: Neck supple. No JVD present. No thyromegaly present.   Cardiovascular: Normal rate, regular rhythm and normal heart sounds.    Pulmonary/Chest: Breath sounds normal. No respiratory distress.   Abdominal: Soft. Exhibits no distension and no mass. There is no hepatosplenomegaly. There is no tenderness. There is no rebound and no guarding.   Musculoskeletal:Exhibits no edema, tenderness or deformity.   Neurological: Alert and oriented to person, place, and time. Exhibits normal muscle tone.   Skin: No ecchymosis, no petechiae and no rash noted. Not diaphoretic. No cyanosis. Nails show no clubbing.   Psychiatric: Normal mood and affect.   Vitals reviewed.      Lab Results   Component Value Date    HGB 11.6 (L) 09/29/2020 "    HCT 35.8 (L) 09/29/2020    MCV 93.7 09/29/2020     09/29/2020    WBC 7.07 09/29/2020    NEUTROABS 3.92 09/29/2020    LYMPHSABS 1.94 09/29/2020    MONOSABS 0.82 09/29/2020    EOSABS 0.18 09/29/2020    BASOSABS 0.07 09/29/2020       Lab Results   Component Value Date    GLUCOSE 104 (H) 09/29/2020    BUN 22 09/29/2020    CREATININE 0.74 (L) 09/29/2020     09/29/2020    K 3.9 09/29/2020     09/29/2020    CO2 28.0 09/29/2020    CALCIUM 8.6 09/29/2020    PROTEINTOT 6.1 09/26/2020    ALBUMIN 3.10 (L) 09/26/2020    ALBUMIN 3.30 (L) 09/26/2020    BILITOT 0.9 09/26/2020    ALKPHOS 71 09/26/2020    AST 41 (H) 09/26/2020    ALT 35 09/26/2020                   ASSESSMENT & PLAN:    1.  Stage IIb T3 N0 M0 clinical and pathological stage adenocarcinoma of the lung versus metastasis from GI tract for tissue staining but no evidence of such on PET or on colonoscopy/EGD status post lobectomy 9/23/2020 with no molecular targets on Caris MI profile.  4 courses cisplatin Alimta recommended once heals from   2.  Hypertension  3.  Hyperlipidemia  4.  Coronary disease status post 8 stents with Dr. Rosen 2019 remains on dual antiplatelet therapy  5.  50-pack-year smoker persistent      -Summer 2020 presented to primary care with chronic cough.  -CT chest done at [facility] in [place] showed 6.5 cm left upper lobe mass with 2 noncalcified nodules in the right lung, 9 mm inferior right upper lobe, 8 mm right middle lobe, and scarring with fibrosis.  -7/31/2020 left upper lobe CT-guided lung biopsy showed adenocarcinoma with mucinous features diffuse positive for CK 7 and patchy positive for CK 20, moderately positive for CEA and CDX 2 and negative for TTF-1 and Napsin.  -8/10/2020 PET scan [facility] regional imaging shows left upper lobe mass SUV 11.3, 6.5 cm.  6 mm inferior right middle lobe nodule not PET avid.  MRI brain done at [facility] showed white matter changes but no  metastasis.  -8/13/2020 [facility] medical oncology initial consultation: There is still some uncertainty as to whether this is metastatic disease to the lung with a large metastasis and other smaller non-PET avid foci versus a lung primary with non-PET avid small lung nodules that may yet be metastatic but not hypermetabolic due to their small size versus a lung primary that should be resected and the other lesions are just benign and therefore not PET avid.  With dual antiplatelet therapy and multiple coronary vessel disease there will be significant operative risk that will need to be addressed by Dr. Rosen.  Would recommend upper or lower GI endoscopy given the CDX 2 and the CK7 positivity for completeness sake especially since the TTF-1 is negative and yet this looks like an adenocarcinoma.  Per my discussion with Dr. Asfa Lobato, we will get him to Dr. Rosen for cardiac clearance and to determine whether he can come off any of his antiplatelet therapies, to gastroenterology for upper and lower GI endoscopy to be sure that there is no occult GI primary that has now metastasized to the lung, to Dr. Lobato's office for pulmonary function testing for adequacy of lung function, and we will have Alina Strauss to present him at multidisciplinary lung conference in the next couple of weeks once this data is available to decide on our plan of attack and ultimately to Dr. Luis Booth for surgical resection as appropriate.  We will get his discs from outside PET and MRI brain downloaded into our system.      -8/25/2020 EGD and colonoscopy showed esophagitis.  Colonoscopy showed polyp with diverticulosis and hemorrhoids of the transverse and ascending colon.  -8/26/2020 pulmonary function test showed FEV1 of 2.51 L and DLCO 73% of predicted.  Patient communicated with Dr. Rosen who is just seeing him back in June who cleared him for surgery.  He can stop his Plavix 7 days before his procedures but cannot stop  his aspirin.    -8/27/2020 [facility] oncology follow-up visit: Presuming the pathology from the EGD and colonoscopy comes back benign and that all that was seen most just a polyp, and given that his pulmonary functions appear adequate, I have spoken with Dr. Steve Booth to consider resection of this and he will coordinate with Dr. Rosen the antiplatelet therapy and whether there needs to be cessation of such.  This will have less to do with surgery and more to do with postop epidural anesthesia risk and I will let Dr. Rosen and Dr. Booth negotiate that path.  I will follow-up the patient in a few weeks presumably postoperatively to discuss adjuvant therapy as indicated based on the pathological stage.  We will get Caris MI profile sent on his original biopsy so that we can have molecular testing to guide us if there is sufficient tissue.  If not we will send it on the resection specimen.  This is important as adjuvant therapy does not always entail chemotherapy.  As to the contralateral 2 small PET negative lung nodules, I suspect this is not metastatic but likely benign but it will need close attention on follow-up imaging postoperatively but I would give him the benefit of the doubt and will resect the obvious 6-1/2 cm lung mass if feasible.      -9/25/2020 inpatient consultation: Patient seen postoperatively.  Reviewed his negative Caris MI profile for actionable targets.  EGD and colonoscopy were negative.  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  Would recommend 4 courses of adjuvant cisplatin Alimta.  We will need a port placed by Dr. Booth.  Following that we will go to routine surveillance follow-up and will give attention to the other lung nodules which as mentioned above we are giving the benefit of the doubt and calling this M0 and went ahead with resection.    -10/8/2020 [facility] medical oncology follow-up  visit: He is having significant postthoracotomy pain and we will get him in with palliative care.  We will see him back on October 16 for chemo education and B12 in preparation for October 23 cisplatin Alimta plus or minus Keytruda on a 3 arm alchemist trial.  The first arm is cisplatin Alimta followed by observation.  The second arm has cisplatin Alimta followed by Keytruda.  The third arm has cisplatin Alimta Keytruda followed by Keytruda.  He will be consented on October 23 but was provided information by my research assistant Inez Bennett today.  He will get to Dr. Booth for port.  He will get dexamethasone twice a day the day before day of and day after each Alimta dose.  He will start folic acid the day of his chemotherapy and stay on that throughout the chemo.  Risks including renal dysfunction, neuropathy, pancytopenia, mucositis, and additional side effects were covered in detail.  Discussed with patient face-to-face 45 minutes greater than 50% spent counseling regarding this plan as outlined above.           Daniel Haro MD    10/08/2020

## 2020-10-08 NOTE — RESEARCH
At the request of Dr. Haro, I met with the patient and his daughter, Chica, to discuss participation in the [id number] ALCHEMIST sub-study. We discussed the study in depth.  We reviewed all eight elements of the ICF.  There was several questions and I answered them all to the satisfaction of the patient and his daughter.  At the conclusion of our discussion, the patient decided he wanted to consent to the study.  He signed the main consent and also the optional portion of the study.  I provided him with a copy of the original ICF and my card.      He will RTC in 1 week for chemo-teach (10/16).  I will see him at this visit and he will do the optional questionnaires for the study.  He is scheduled for scans and pre-treatment labs (can be done within 48 hours) on 10/22.     I will not be able to enroll him until 10/23 as he must be 30 days out from his surgery.  Therefore, I will enroll him on 10/23 prior to seeing Dr. Haro at 08:30.  The patient will have pre-treatment research labs in infusion before he sees Dr. Haro.      The patient will be randomized to either chemo alone followed by observation, chemo + Keytruda followed by Keytruda, or chemo alone followed by Keytruda.  We will not know which arm until 10/23.      The patient has my contact information and knows to call me if he has any questions in the mean time.  I will call him today to let him know about his scans.     Inez Houser RN

## 2020-10-09 ENCOUNTER — APPOINTMENT (OUTPATIENT)
Dept: PREADMISSION TESTING | Facility: HOSPITAL | Age: 60
End: 2020-10-09

## 2020-10-09 ENCOUNTER — PREP FOR SURGERY (OUTPATIENT)
Dept: OTHER | Facility: HOSPITAL | Age: 60
End: 2020-10-09

## 2020-10-09 ENCOUNTER — TELEPHONE (OUTPATIENT)
Dept: CARDIAC SURGERY | Facility: CLINIC | Age: 60
End: 2020-10-09

## 2020-10-09 DIAGNOSIS — C34.90 MALIGNANT NEOPLASM OF LUNG, UNSPECIFIED LATERALITY, UNSPECIFIED PART OF LUNG (HCC): Primary | ICD-10-CM

## 2020-10-09 RX ORDER — CHLORHEXIDINE GLUCONATE 500 MG/1
1 CLOTH TOPICAL EVERY 12 HOURS PRN
Status: CANCELLED | OUTPATIENT
Start: 2020-10-09

## 2020-10-09 NOTE — TELEPHONE ENCOUNTER
Pt called and CX port placement with Dr. Booth-stated he is having some stomach issues and can not leave the house. I have placed him back on Dr. Booth pending list.

## 2020-10-09 NOTE — DISCHARGE SUMMARY
CTS Discharge Summary    Patient Care Team:  Siddharth Godwin MD as PCP - General (Family Medicine)  Asaf Lobato MD as Consulting Physician (Pulmonary Disease)  Janes Rosen MD as Consulting Physician (Cardiology)  Luis Booth MD as Surgeon (Cardiothoracic Surgery)  Daniel Haro MD as Consulting Physician (Hematology and Oncology)      Date of Admission: 9/23/2020  7:59 AM  Date of Discharge:  10/1/2020    Discharge Diagnosis  Past Medical History:   Diagnosis Date   • Anticoagulated    • Coronary artery disease    • Fractures    • GERD (gastroesophageal reflux disease)    • Heart murmur    • Hyperlipidemia    • Hypertension    • Lung mass     lung biopsy today 7/31/2020   • Prediabetes          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  This patient has a 50-pack/year history of smoking.  He underwent a CT scan by his family physician, demonstrating a left-sided lung mass.  A subsequent biopsy demonstrated the presence of adenocarcinoma, but I have yet to determine and find the exact pathology report to confirm that. A subsequent work-up by oncology services, pulmonary services and GI medicine services, believe this is an isolated lung mass primary lung cancer and not metastatic disease. A pulmonary function test was adequate for surgery and he was referred to me to evaluate for surgical intervention.  A MRI scan of the brain demonstrated no metastasis and PET scan demonstrated no evidence of hypermetabolic uptake elsewhere. The patient denies any weight loss, cough, or hemoptysis.      Hospital Course  The patient is a 60 year old male who was admitted on  9/23/2020 and underwent a left thoracotomy with left upper lobectomy performed by Dr Luis Booth.  The patient tolerated the procedure well without any immediate complications and was transferred to the ICU in stable condition.  On postoperative day #1, the patient was doing well having previously been extubated and pain was well controlled with the epidural.  The patient experienced urinary retention and was subsequently started on Flomax.   On 9/25/2020 the patient was found to be volume overloaded and was successfully diuresed.  Dr Daniel Haro with oncology was consulted as the patient had previously established care with him.  Over the next several days, the patient worked with the nursing staff and physical therapy to ambulate in the hallway and improve his functional status.  The patient was ready for transfer to telemetry.  Final pathology came back revealing:  Final Diagnosis   1.  LUNG, LEFT UPPER LOBE, LOBECTOMY:               Mucinous adenocarcinoma, 6.7 cm.               Margins are negative for tumor.               Three benign lymph nodes (0/3).               See synoptic report.  2.  LEVEL 9 LYMPH NODE, EXCISION:               One benign anthracotic lymph node (0/1).  3.  LEVEL 6 LYMPH NODE, EXCISION:               One benign anthracotic lymph node (0/1).  4.  LEVEL 7 LYMPH NODE, EXCISION:  One benign anthracotic lymph node (0/1).  5.  LEVEL 8 LYMPH NODE, EXCISION:  One benign anthracotic lymph node (     This resulted in prognostic stage group of IIB.  On 9/29/2020 the patient had the chest tubes removed without difficulty.  On 10/1/2020 the patient met criteria for discharge and was discharged home without difficulty.    Procedures Performed  Procedure(s):  BRONCHOSCOPY LEFT THORACOTOMY LEFT UPPER LOBECTOMY, MEDIASTINAL LYMPH NODE DISSECTION       Consults:   Consults     Date and Time Order Name Status Description    9/25/2020 0824 Inpatient Hematology & Oncology Consult Completed          Intensivist    Prognostic Stage Group:   IIB    Discharge Medications     Discharge Medications      New Medications      Instructions Start Date   HYDROcodone-acetaminophen 5-325 MG per tablet  Commonly known as: NORCO   1 tablet, Oral, Every 8 Hours PRN      tamsulosin 0.4 MG capsule 24 hr capsule  Commonly known as: FLOMAX   0.4 mg, Oral, Daily         Continue These Medications      Instructions Start Date   aspirin 81 MG EC tablet   81 mg, Oral, Daily      carvedilol 12.5 MG tablet  Commonly known as: COREG   12.5 mg, Oral, 2 Times Daily With Meals      clopidogrel 75 MG tablet  Commonly known as: PLAVIX   75 mg, Oral, Daily      irbesartan 150 MG tablet  Commonly known as: AVAPRO   150 mg, Oral, Daily      metFORMIN  MG 24 hr tablet  Commonly known as: GLUCOPHAGE-XR   TK 1 T PO QD AFTER SUPPER      pantoprazole 40 MG EC tablet  Commonly known as: PROTONIX   40 mg, Oral, Daily      Praluent 150 MG/ML solution prefilled syringe  Generic drug: Alirocumab   Subcutaneous, Every 14 Days      varenicline 1 MG tablet  Commonly known as: CHANTIX   1 mg, Oral, 2 Times Daily      vitamin B-12 500 MCG tablet  Commonly known as: CYANOCOBALAMIN   500 mcg, Oral, Daily      Vitamin D3 50 MCG (2000 UT) tablet   Oral, Daily      Zinc 30 MG tablet   Oral, Daily             Discharge Diet:   Diet Instructions     Diet: Cardiac      Discharge Diet: Cardiac          Activity at Discharge:   Activity Instructions     Bathing Restrictions      Type of Restriction: Bathing    Bathing Restrictions: No Tub Bath    Driving Restrictions      Type of Restriction: Driving    Driving Restrictions: No Driving Until Next Appointment    Lifting Restrictions      Type of Restriction: Lifting    Lifting Restrictions: Lifting Restriction (Indicate Limit)    Weight Limit (Pounds): 15    Length of Lifting Restriction: until next appointment        Do not drive while taking narcotics    Follow-up Appointments  Future Appointments   Date  Time Provider Department Center   10/9/2020 12:15 PM PAT 1 ANA[facility] BH ANA[facility] PAT ANA[facility]   10/11/2020  3:30 PM C19 ANALI [facility] PS  ANALI C19AL ANALI   10/16/2020  2:00 PM Alina Bhardwaj APRN [facility]   10/16/2020  3:00 PM CHAIR 21  ANALI OPI ANALI   10/22/2020  9:30 AM ANALI CODIE CT 1  ANALI CT AL [facility]   10/22/2020 10:30 AM ANALI CODIE MRI 1  ANALI MRI A [place]   10/23/2020  7:30 AM CHAIR 1  ANALI OPI ANALI   10/23/2020  8:00 AM Daniel Haro MD [facility] A[facility]   10/23/2020  8:30 AM ROOM D  ANALI OPI ANALI          PETER Leonard  10/09/20  08:22 EDT

## 2020-10-11 ENCOUNTER — APPOINTMENT (OUTPATIENT)
Dept: PREADMISSION TESTING | Facility: HOSPITAL | Age: 60
End: 2020-10-11

## 2020-10-13 ENCOUNTER — TELEPHONE (OUTPATIENT)
Dept: ONCOLOGY | Facility: CLINIC | Age: 60
End: 2020-10-13

## 2020-10-13 NOTE — TELEPHONE ENCOUNTER
Returned call to patient. At this time patient stating he is wanting to cancel appointments stating he has been having so much belly pain and no one can tell him why and he wants to follow up with family PCP and see what he thinks. Patient stating I just need to clear my mind and I'm having second thoughts about even getting treatment. Discussing with patient's that he should keep appointments so if he discusses with PCP he may change his mind. Patient stating no I want to cancel for now and that I can call you all back. Discussing with patient that with cancer if he put off doing anything that if he changed his mind we may not be as affective with symptom management. Patient stating he understood. Discussed with patient if he wanted to think about everything and then come see Dr. Haro in a week in a half on Oct 23 scheduled appointment. At this time patient refusing to keep appointment. Patient stating I don't even know if I want treatment. Asked patient to please keep us updated on his decisions. Patient stating he would.

## 2020-10-13 NOTE — TELEPHONE ENCOUNTER
Pt called because he received a call from Jack, he thinks. Pt states that he is supposed to begin treatment soon but he does not want to because of the pain in his abdomen (states that he has brought this up before). He wants to get the pain resolved before he does anything else and he intends to see his PCP regarding this tomorrow.     Please call pt and discuss this with him. [phone]

## 2020-10-15 ENCOUNTER — TELEPHONE (OUTPATIENT)
Dept: CARDIAC SURGERY | Facility: CLINIC | Age: 60
End: 2020-10-15

## 2020-10-15 NOTE — TELEPHONE ENCOUNTER
Pt called me back regarding his port placement-He explained that he has been having a lot of stomach issues and has been following up with his PCP to figure out what is going on with his stomach pain and until he has an answer for this he does not want to have the port placement. I will keep him on Dr. Booth pending list till he is ready to have this done.

## 2020-10-15 NOTE — TELEPHONE ENCOUNTER
Tried calling Mr. Morley to see if he would be interested in doing his port placement on 10/20. I do not have this scheduled yet. I will wait till he returns my phone call.

## 2020-10-16 ENCOUNTER — READMISSION MANAGEMENT (OUTPATIENT)
Dept: CALL CENTER | Facility: HOSPITAL | Age: 60
End: 2020-10-16

## 2020-10-16 NOTE — OUTREACH NOTE
General Surgery Week 2 Survey      Responses   [facility] patient discharged from?  [place]   Does the patient have one of the following disease processes/diagnoses(primary or secondary)?  General Surgery   Week 2 attempt successful?  No   Unsuccessful attempts  Attempt 1          Soha Nguyễn RN

## 2020-10-21 ENCOUNTER — READMISSION MANAGEMENT (OUTPATIENT)
Dept: CALL CENTER | Facility: HOSPITAL | Age: 60
End: 2020-10-21

## 2020-10-21 NOTE — OUTREACH NOTE
General Surgery Week 2 Survey      Responses   [facility] patient discharged from?  [place]   Does the patient have one of the following disease processes/diagnoses(primary or secondary)?  General Surgery   Week 2 attempt successful?  Yes   Call start time  1000   Rescheduled  Revoked   Revoke  Decline to participate   Discharge diagnosis  JUDITH lobectomy adenocarcinoma   Comments  PCP 10/21/2020          Risa Rincon RN

## 2020-10-22 ENCOUNTER — HOSPITAL ENCOUNTER (OUTPATIENT)
Dept: MRI IMAGING | Facility: HOSPITAL | Age: 60
Discharge: HOME | End: 2020-10-22

## 2020-10-22 ENCOUNTER — HOSPITAL ENCOUNTER (OUTPATIENT)
Dept: CT IMAGING | Facility: HOSPITAL | Age: 60
Discharge: HOME | End: 2020-10-22

## 2020-10-22 DIAGNOSIS — C34.12 MALIGNANT NEOPLASM OF UPPER LOBE OF LEFT LUNG (HCC): ICD-10-CM

## 2020-10-22 DIAGNOSIS — C34.92 ADENOCARCINOMA OF LEFT LUNG (HCC): Chronic | ICD-10-CM

## 2020-10-22 PROCEDURE — 25010000002 IOPAMIDOL 61 % SOLUTION: Performed by: INTERNAL MEDICINE

## 2020-10-22 PROCEDURE — 71260 CT THORAX DX C+: CPT

## 2020-10-22 PROCEDURE — 74177 CT ABD & PELVIS W/CONTRAST: CPT

## 2020-10-22 PROCEDURE — A9577 INJ MULTIHANCE: HCPCS | Performed by: INTERNAL MEDICINE

## 2020-10-22 PROCEDURE — 70553 MRI BRAIN STEM W/O & W/DYE: CPT

## 2020-10-22 PROCEDURE — 0 GADOBENATE DIMEGLUMINE 529 MG/ML SOLUTION: Performed by: INTERNAL MEDICINE

## 2020-10-22 RX ADMIN — IOPAMIDOL 100 ML: 612 INJECTION, SOLUTION INTRAVENOUS at 10:10

## 2020-10-22 RX ADMIN — GADOBENATE DIMEGLUMINE 20 ML: 529 INJECTION, SOLUTION INTRAVENOUS at 10:25

## 2020-10-24 PROBLEM — R41.0 DRUG-INDUCED CONFUSION: Status: ACTIVE | Noted: 2020-10-24

## 2020-10-24 PROBLEM — T50.905A DRUG-INDUCED CONFUSION: Status: ACTIVE | Noted: 2020-10-24

## 2020-10-24 PROBLEM — G89.18 POSTOPERATIVE PAIN: Status: ACTIVE | Noted: 2020-10-24

## 2020-10-26 ENCOUNTER — TELEPHONE (OUTPATIENT)
Dept: CARDIAC SURGERY | Facility: CLINIC | Age: 60
End: 2020-10-26

## 2020-10-26 NOTE — TELEPHONE ENCOUNTER
I s/w Eliazar this morning to see if he is ready to r/s his port placement. He stated that he does not want to have Chemo and doesn't feel he needs this port. I told him to contact his ONC provider to discuss that with them but I will inform Dr. Booth as well.

## 2020-10-29 ENCOUNTER — APPOINTMENT (OUTPATIENT)
Dept: PREADMISSION TESTING | Facility: HOSPITAL | Age: 60
End: 2020-10-29

## 2020-11-04 ENCOUNTER — TELEPHONE (OUTPATIENT)
Dept: ONCOLOGY | Facility: CLINIC | Age: 60
End: 2020-11-04

## 2020-11-04 NOTE — RESEARCH
I called Mr Morley today and s/w him about his continued participation in the [id number] clinical trial.  He does still want to participate in the screening study, but does not want to pursue any further treatment at this time.  He is c/o continued abdominal pain.  He is working with his PCP about this issue.  I asked if he would be willing to come in this week to collect the needed baseline blood sample for the study required for all participants.  He did not want to come in for this.  I have updated the EDC to show that he refused.  He does still want to stay on the study and is agreeable for research staff to continue to follow him for survival and disease status every 6 mos.  He has my number and I encouraged him to call me if he has any questions or changes his mind.     Inez Houser RN

## 2020-11-11 ENCOUNTER — TELEPHONE (OUTPATIENT)
Dept: CARDIAC SURGERY | Facility: CLINIC | Age: 60
End: 2020-11-11

## 2020-11-11 NOTE — TELEPHONE ENCOUNTER
I s/w Mr. Morley today to see if he was ready to schedule his port placement. He stated that he is still trying to figure out what is going on with his stomach pain. He does not wish to proceed with the port placement till this has been resolved. I have sent the referral back to Dr. Haro office.

## 2020-11-20 ENCOUNTER — NURSE NAVIGATOR (OUTPATIENT)
Dept: ONCOLOGY | Facility: CLINIC | Age: 60
End: 2020-11-20

## 2020-11-20 NOTE — PROGRESS NOTES
Called patient to check on status and discuss rescheduling appointment with Dr. Haro. No answer, LVM.

## 2020-12-31 ENCOUNTER — TELEPHONE (OUTPATIENT)
Dept: ONCOLOGY | Facility: CLINIC | Age: 60
End: 2020-12-31

## 2020-12-31 NOTE — TELEPHONE ENCOUNTER
----- Message from Ayanna Garrett RN sent at 12/31/2020 10:22 AM EST -----  Regarding: FW: Further f/u  This is the patient Dr. Haro wants a certified letter sent to.  ----- Message -----  From: Alina Strauss RN  Sent: 12/30/2020   3:49 PM EST  To: Ayanna Garrett RN  Subject: Further f/u                                      Yuly Urena,  I see this patient has tried to be reached several times regarding f/u. Not sure if he needs to be sent a letter or what the legal steps are. Just don't want him slipping through the cracks and not getting the care he needs. If I can help, please let me know.  Thanks,  Alina Baca

## 2022-04-20 RX ORDER — IRBESARTAN 150 MG/1
150 TABLET ORAL DAILY
Qty: 30 TABLET | Refills: 0 | Status: SHIPPED | OUTPATIENT
Start: 2022-04-20 | End: 2022-05-04 | Stop reason: SDUPTHER

## 2022-05-04 ENCOUNTER — OFFICE VISIT (OUTPATIENT)
Dept: FAMILY MEDICINE CLINIC | Facility: CLINIC | Age: 62
End: 2022-05-04

## 2022-05-04 VITALS
OXYGEN SATURATION: 96 % | HEART RATE: 71 BPM | TEMPERATURE: 98.1 F | SYSTOLIC BLOOD PRESSURE: 146 MMHG | HEIGHT: 70 IN | WEIGHT: 233.6 LBS | BODY MASS INDEX: 33.44 KG/M2 | DIASTOLIC BLOOD PRESSURE: 98 MMHG

## 2022-05-04 DIAGNOSIS — I10 HYPERTENSION, ESSENTIAL: Primary | ICD-10-CM

## 2022-05-04 PROCEDURE — 99213 OFFICE O/P EST LOW 20 MIN: CPT | Performed by: PHYSICIAN ASSISTANT

## 2022-05-04 RX ORDER — OXYCODONE AND ACETAMINOPHEN 10; 325 MG/1; MG/1
1 TABLET ORAL EVERY 6 HOURS PRN
COMMUNITY
Start: 2022-04-21 | End: 2022-10-01 | Stop reason: SDUPTHER

## 2022-05-04 RX ORDER — IRBESARTAN 150 MG/1
150 TABLET ORAL DAILY
Qty: 90 TABLET | Refills: 1 | Status: ON HOLD | OUTPATIENT
Start: 2022-05-04 | End: 2022-10-07 | Stop reason: SDUPTHER

## 2022-05-04 RX ORDER — CARVEDILOL 12.5 MG/1
12.5 TABLET ORAL 2 TIMES DAILY WITH MEALS
Qty: 180 TABLET | Refills: 1 | Status: SHIPPED | OUTPATIENT
Start: 2022-05-04 | End: 2022-10-07 | Stop reason: HOSPADM

## 2022-05-04 RX ORDER — OXYCODONE 13.5 MG/1
CAPSULE, EXTENDED RELEASE ORAL
COMMUNITY
Start: 2022-04-29 | End: 2022-10-05

## 2022-05-04 RX ORDER — EVOLOCUMAB 140 MG/ML
140 INJECTION, SOLUTION SUBCUTANEOUS
COMMUNITY
Start: 2022-04-11

## 2022-09-30 ENCOUNTER — HOSPITAL ENCOUNTER (EMERGENCY)
Facility: HOSPITAL | Age: 62
Discharge: HOME | End: 2022-10-01
Attending: EMERGENCY MEDICINE | Admitting: EMERGENCY MEDICINE

## 2022-09-30 DIAGNOSIS — M25.50 DIFFUSE ARTHRALGIA: ICD-10-CM

## 2022-09-30 DIAGNOSIS — C34.92 MALIGNANT NEOPLASM OF LEFT LUNG STAGE 4: ICD-10-CM

## 2022-09-30 DIAGNOSIS — F17.200 TOBACCO DEPENDENCE: ICD-10-CM

## 2022-09-30 DIAGNOSIS — G89.3 CHRONIC PAIN DUE TO NEOPLASM: Primary | ICD-10-CM

## 2022-09-30 DIAGNOSIS — Z86.79 HISTORY OF CORONARY ARTERY DISEASE: ICD-10-CM

## 2022-09-30 PROCEDURE — 99283 EMERGENCY DEPT VISIT LOW MDM: CPT

## 2022-09-30 RX ORDER — OXYCODONE AND ACETAMINOPHEN 10; 325 MG/1; MG/1
1 TABLET ORAL ONCE
Status: COMPLETED | OUTPATIENT
Start: 2022-09-30 | End: 2022-10-01

## 2022-10-01 VITALS
SYSTOLIC BLOOD PRESSURE: 134 MMHG | DIASTOLIC BLOOD PRESSURE: 50 MMHG | WEIGHT: 205 LBS | HEART RATE: 67 BPM | RESPIRATION RATE: 16 BRPM | TEMPERATURE: 97.8 F | BODY MASS INDEX: 29.35 KG/M2 | HEIGHT: 70 IN | OXYGEN SATURATION: 96 %

## 2022-10-01 RX ORDER — OXYCODONE AND ACETAMINOPHEN 10; 325 MG/1; MG/1
1 TABLET ORAL EVERY 6 HOURS PRN
Qty: 12 TABLET | Refills: 0 | Status: SHIPPED | OUTPATIENT
Start: 2022-10-01 | End: 2022-10-05

## 2022-10-01 RX ADMIN — OXYCODONE HYDROCHLORIDE AND ACETAMINOPHEN 1 TABLET: 10; 325 TABLET ORAL at 01:36

## 2022-10-01 NOTE — ED PROVIDER NOTES
"Subjective   History of Present Illness  This is a 62-year-old male that presents the ER with generalized arthralgia, including upper back pain, shoulder pain, hip pain, and pain all across the chest, chronic.  Patient has history of stage IV lung cancer which was diagnosed 2 years ago.  Patient says he initially followed with Dr. Haro, but he says \"I lost interest\" and he also said that they did not \"click\".  Patient did undergo left upper lobectomy and started seeing oncology in [place], Dr. Maldonado.  Patient says that he underwent 8 rounds of chemotherapy but he did not feel that it was working.  Multiple scans showed that cancer had enlarged.  Patient also had biopsy which was extremely hard on him and caused severe, chronic pain afterwards.  The biopsy was approximately 1 year ago.  Patient says that every time he went to the oncologist, he was a hard IV stick and they poked him multiple times and patient said that he just cannot do it anymore.  He was receiving routine pain medications of Xtampza ER 13.5 mg BID and Percocet 10 mg by mouth nightly day.  He ran out of these medications at least 4 to 6 weeks ago.  Patient has been having daily, recurrent pain secondary to metastatic cancer.  He denies any shortness of breath.  He quit smoking but then he started smoking again 2 months ago.  He is smoking half pack to 1 pack/day.  Patient denies any particular abdominal pain.  He denies nausea, vomiting, or diarrhea.  He does not wish to have any work-up.  He simply wants to see if he can get some type of pain relief.  He is scheduled to have a follow-up appointment to establish with a new PCP in the near future and is working on a new referral to oncology out of [place].  Patient refuses any referral to [facility] oncology.  Patient says that Dr. Maldonado did not want to give him any further pain medication prescriptions because patient did not want to continue with treatment with him.  Patient " "denies any fever or chills.  No other complaints at this time.  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    History provided by:  Patient  Illness  Location:  Generalized arthralgias secondary to metastic lung cancer.  Chronicity:  Chronic  Context:  Diagnosed with Stage 4 lung CA 2 years ago.  Initially followed with Dr. Haro but pt says \"I lost interest\".  S/P left upper lobectomy.  Then started seeing Oncology in [place] and had 8 rounds of Chemo.  Scans showed CA had enlarged.  Had bx and pain worsened 1 yr ago.   Ineffective treatments:  Naproxen. Pt has been out of pain meds for 4-6 weeks.  Associated symptoms: chest pain (Pain all across chest.)    Associated symptoms: no abdominal pain, no congestion, no cough, no diarrhea, no ear pain, no fatigue, no headaches, no myalgias, no nausea, no shortness of breath, no vomiting and no wheezing    Risk factors:  Stage 4 Lung cancer with mets to liver and spine.  Worsening upper back pain and pain all across chest. Also pain to shoulders and hips.      Review of Systems   Constitutional: Negative.  Negative for fatigue.   HENT: Negative.  Negative for congestion and ear pain.    Respiratory: Negative for cough, shortness of breath and wheezing.         History of Stage 4 lung cancer with mets to liver and spine.  Essentially medically non-compliant.  Pt has not completed recommended chemo.  Pt following with Oncologist, Dr. Maldonado in [place]. Hasn't been seeing him either because he doesn't want to continue treatment with him. Former smoker, but pt started smoking again 2 months ago. Smoking 1/2ppd to 1ppd.   Cardiovascular: Positive for chest pain (Pain all across chest.). Negative for palpitations and " leg swelling.   Gastrointestinal: Negative for abdominal pain, diarrhea, nausea and vomiting.   Genitourinary: Negative.  Negative for dysuria, flank pain, frequency and urgency.   Musculoskeletal: Positive for back pain (Chronic pain to upper back; h/o lung cancer with mets to spine.). Negative for myalgias.   Neurological: Negative.  Negative for dizziness, syncope, weakness and headaches.   All other systems reviewed and are negative.      Past Medical History:   Diagnosis Date   • Adenocarcinoma of lung (HCC) 07/2020   • Anticoagulated    • Appendicitis    • Coronary arteriosclerosis in native artery    • Coronary artery disease    • Elevated blood pressure reading    • Esophageal reflux    • Fracture    • Fractures    • GERD (gastroesophageal reflux disease)    • Hearing disorder    • Heart murmur    • Hypercholesterolemia    • Hyperlipidemia    • Hypertension    • Lung mass     lung biopsy today 7/31/2020   • Mixed hyperlipidemia    • Prediabetes    • Tendon sheath disorder     INJECTION TENDON SHEATH, LIGAMENT       No Known Allergies    Past Surgical History:   Procedure Laterality Date   • AMPUTATION FINGER / THUMB Right    • APPENDECTOMY     • BRONCHOSCOPY THORACOTOMY Left 09/23/2020    Procedure: BRONCHOSCOPY LEFT THORACOTOMY LEFT UPPER LOBECTOMY, MEDIASTINAL LYMPH NODE DISSECTION;  Surgeon: Luis Booth MD;  Location: On license of [facility] OR;  Service: Cardiothoracic;  Laterality: Left;   • CARDIAC CATHETERIZATION     • CARDIAC CATHETERIZATION N/A 08/17/2018    Procedure: Left Heart Cath;  Surgeon: Janes Rosen MD;  Location:  [facility]ATION;  Service: Cardiovascular   • CAROTID STENT  2009    3 STENTS   • COLONOSCOPY     • CORONARY STENT PLACEMENT      x8   • TIBIA FRACTURE SURGERY Bilateral    • TRIGGER FINGER RELEASE Bilateral        Family History   Problem Relation Age of Onset   • Emphysema Mother    • Osteoarthritis Mother    • Stroke Mother    • Cancer Mother    • Alzheimer's  disease Father    • Stroke Father    • Osteoarthritis Father    • Hearing loss Father    • Depression Brother    • Osteoarthritis Brother    • Rheum arthritis Brother    • Other Brother 26        HIP REPLACED   • Cancer Other    • Heart attack Paternal Grandmother    • Hypertension Sister    • Cirrhosis Sister    • Asthma Sister    • Depression Sister        Social History     Socioeconomic History   • Marital status:    • Number of children: 1   Tobacco Use   • Smoking status: Former Smoker     Packs/day: 1.00     Years: 45.00     Pack years: 45.00     Types: Cigarettes     Quit date: 2020     Years since quittin.2   • Smokeless tobacco: Never Used   Vaping Use   • Vaping Use: Never used   Substance and Sexual Activity   • Alcohol use: Yes     Alcohol/week: 6.0 standard drinks     Types: 6 Cans of beer per week     Comment: Rarely   • Drug use: No   • Sexual activity: Defer           Objective   Physical Exam  Vitals and nursing note reviewed.   Constitutional:       General: He is not in acute distress.     Appearance: Normal appearance. He is not ill-appearing, toxic-appearing or diaphoretic.      Comments: Patient resting comfortably.  Nontoxic.  Strong tobacco odor noted.  Patient does not appear ill.  No acute sign of distress.   HENT:      Head: Normocephalic and atraumatic.      Nose: Nose normal.      Mouth/Throat:      Mouth: Mucous membranes are moist.      Pharynx: Oropharynx is clear.   Eyes:      Extraocular Movements: Extraocular movements intact.      Conjunctiva/sclera: Conjunctivae normal.      Pupils: Pupils are equal, round, and reactive to light.   Cardiovascular:      Rate and Rhythm: Normal rate and regular rhythm.  No extrasystoles are present.     Pulses: Normal pulses.           Dorsalis pedis pulses are 2+ on the right side and 2+ on the left side.        Posterior tibial pulses are 2+ on the right side and 2+ on the left side.      Heart sounds: Normal heart sounds.       "Comments: Regular rate and rhythm.  No ectopy.  Trace edema to bilateral ankles and feet.  No erythema or warmth.  Pulmonary:      Effort: Pulmonary effort is normal. No tachypnea or accessory muscle usage.      Breath sounds: Normal breath sounds. No decreased breath sounds, wheezing, rhonchi or rales.      Comments: No tachypnea or retractions.  Lungs are clear to auscultation bilaterally.  Abdominal:      General: Bowel sounds are normal.      Palpations: Abdomen is soft.      Tenderness: There is no abdominal tenderness. There is no right CVA tenderness, left CVA tenderness, guarding or rebound.      Hernia: A hernia is present. Hernia is present in the umbilical area.      Comments: Abdomen soft without distention.  Active bowel sounds in all 4 quadrants.  Nontender to palpation.   Musculoskeletal:         General: Normal range of motion.      Cervical back: Normal range of motion and neck supple.      Right lower leg: Edema present.      Left lower leg: Edema present.   Skin:     General: Skin is warm and dry.   Neurological:      General: No focal deficit present.      Mental Status: He is alert.      Cranial Nerves: Cranial nerves are intact.      Sensory: Sensation is intact.      Motor: Motor function is intact.      Coordination: Coordination is intact.      Comments: Neuro intact and nonfocal         Procedures           ED Course  ED Course as of 10/01/22 0059   Fri Sep 30, 2022   2346 Pt does not want any radiologic or lab testing. He does not feel that this is necessary.  He simply wants some pain meds until he can get into new PCP and get new referral to Oncology. [FC]   Sat Oct 01, 2022   0058 Vital signs and exam are stable.  As mentioned before, patient refuses any radiologic studies, and he refuses any EKG or labs.  He said \"what is the point\".  Patient does have stage IV metastatic lung cancer.  He is in the process of establishing with new PCP and getting a new oncologist.  He has been seen by " "[facility] oncology in the past, Dr. Haro, and does not want to be referred there again.  He also has recently discontinued following with oncology in [place], [place].  Discussed the case with Dr. Reyna, ER attending physician.  We will prescribe #12 Percocet 10 mg by mouth 3 times daily on discharge with no refills.  Recommend patient to get into new PCP as soon as possible so he can continue treatment with lung cancer. [FC]      ED Course User Index  [FC] Christen Ash PA-C      No results found for this or any previous visit (from the past 24 hour(s)).  Note: In addition to lab results from this visit, the labs listed above may include labs taken at another facility or during a different encounter within the last 24 hours. Please correlate lab times with ED admission and discharge times for further clarification of the services performed during this visit.    No orders to display     Vitals:    09/30/22 2036 09/30/22 2230 09/30/22 2300 10/01/22 0030   BP: 140/72 140/70 125/67 152/81   BP Location: Left arm      Patient Position: Sitting      Pulse: 65      Resp: 18      Temp: 97.8 °F (36.6 °C)      TempSrc: Oral      SpO2: 97% 96% 94% 95%   Weight: 93 kg (205 lb)      Height: 177.8 cm (70\")        Medications   oxyCODONE-acetaminophen (PERCOCET)  MG per tablet 1 tablet (has no administration in time range)     ECG/EMG Results (last 24 hours)     ** No results found for the last 24 hours. **        No orders to display                MAIRA query complete. Treatment plan to include limited course of prescribed  controlled substance. Risks including addiction, benefits, and alternatives presented to patient.                                MDM    Final diagnoses:   Chronic pain due to neoplasm   Malignant neoplasm of left lung stage 4 (HCC)   Tobacco dependence   History of coronary artery disease   Diffuse arthralgia       ED Disposition  ED Disposition     ED Disposition   Discharge    " Condition   Stable    Comment   --             Routine PCP with upcoming follow up      Keep upcoming appointment as scheduled and strongly suggest oncology referral due to stage IV lung cancer    [facility]  [address] [address] [phone]    If symptoms worsen         Medication List      Changed    oxyCODONE-acetaminophen  MG per tablet  Commonly known as: PERCOCET  Take 1 tablet by mouth Every 6 (Six) Hours As Needed for Moderate Pain. for pain  What changed: reasons to take this           Where to Get Your Medications      These medications were sent to [id number] - [place], [place] - [address] AT [facility] [address] [facility] - [phone]  - [phone]   [address] [address]    Phone: [phone]   · oxyCODONE-acetaminophen  MG per tablet          Christen Ash PA-C  10/01/22 0059

## 2022-10-01 NOTE — DISCHARGE INSTRUCTIONS
Patient has metastatic lung cancer with chronic pain.  He is in the process of finding a new oncologist and wants to follow-up with establish new primary care physician with scheduled appointment and then seek referral to oncology.  I offered oncology referral through [facility], but patient refuses.  He simply wants medication refill of Percocet 10 mg by mouth 3 times daily dispense 12 no refills.  We will give him 3-day supply and recommend close recheck.  Continue with all other current medical management.  Return to the ER if worsening symptoms.

## 2022-10-03 ENCOUNTER — TELEPHONE (OUTPATIENT)
Dept: FAMILY MEDICINE CLINIC | Facility: CLINIC | Age: 62
End: 2022-10-03

## 2022-10-03 NOTE — TELEPHONE ENCOUNTER
Caller: Eliazar Morley    Relationship to patient: Self    Best call back number: [phone]    Chief complaint: PAIN & ISSUES WITH BREATHING. HAS LUNG CANCER    Type of visit: SAME OR AS SOON AS POSSIBLE - NEW PATIENT. HE HAS HAD A ROUGH WEEKEND.    Requested date: AS SOON AS POSSIBLE    If rescheduling, when is the original appointment: 10/11/22    Additional notes:[place] TO GET IN AS SOON AS POSSIBLE TO GET REFERRED TO A PAIN CLINIC FOR PAIN.

## 2022-10-05 ENCOUNTER — HOSPITAL ENCOUNTER (OUTPATIENT)
Facility: HOSPITAL | Age: 62
Setting detail: OBSERVATION
Discharge: HOSPICE | End: 2022-10-07
Attending: EMERGENCY MEDICINE | Admitting: INTERNAL MEDICINE

## 2022-10-05 ENCOUNTER — APPOINTMENT (OUTPATIENT)
Dept: CT IMAGING | Facility: HOSPITAL | Age: 62
End: 2022-10-05

## 2022-10-05 ENCOUNTER — APPOINTMENT (OUTPATIENT)
Dept: GENERAL RADIOLOGY | Facility: HOSPITAL | Age: 62
End: 2022-10-05

## 2022-10-05 DIAGNOSIS — I26.99 OTHER ACUTE PULMONARY EMBOLISM WITHOUT ACUTE COR PULMONALE: Primary | ICD-10-CM

## 2022-10-05 DIAGNOSIS — C34.90 PRIMARY MALIGNANT NEOPLASM OF LUNG METASTATIC TO OTHER SITE, UNSPECIFIED LATERALITY: ICD-10-CM

## 2022-10-05 DIAGNOSIS — C34.90 MALIGNANT NEOPLASM OF LUNG, UNSPECIFIED LATERALITY, UNSPECIFIED PART OF LUNG: ICD-10-CM

## 2022-10-05 DIAGNOSIS — Z72.0 TOBACCO USE: ICD-10-CM

## 2022-10-05 PROBLEM — R52 PAIN: Status: ACTIVE | Noted: 2022-10-05

## 2022-10-05 LAB
ALBUMIN SERPL-MCNC: 4.4 G/DL (ref 3.5–5.2)
ALBUMIN/GLOB SERPL: 1.3 G/DL
ALP SERPL-CCNC: 176 U/L (ref 39–117)
ALT SERPL W P-5'-P-CCNC: 18 U/L (ref 1–41)
ANION GAP SERPL CALCULATED.3IONS-SCNC: 15 MMOL/L (ref 5–15)
APTT PPP: 36.7 SECONDS (ref 60–90)
AST SERPL-CCNC: 22 U/L (ref 1–40)
BASOPHILS # BLD AUTO: 0.05 10*3/MM3 (ref 0–0.2)
BASOPHILS NFR BLD AUTO: 0.5 % (ref 0–1.5)
BILIRUB SERPL-MCNC: 0.5 MG/DL (ref 0–1.2)
BILIRUB UR QL STRIP: NEGATIVE
BUN SERPL-MCNC: 19 MG/DL (ref 8–23)
BUN/CREAT SERPL: 20.4 (ref 7–25)
CALCIUM SPEC-SCNC: 10.1 MG/DL (ref 8.6–10.5)
CHLORIDE SERPL-SCNC: 103 MMOL/L (ref 98–107)
CLARITY UR: CLEAR
CO2 SERPL-SCNC: 24 MMOL/L (ref 22–29)
COLOR UR: YELLOW
CREAT SERPL-MCNC: 0.93 MG/DL (ref 0.76–1.27)
DEPRECATED RDW RBC AUTO: 45.7 FL (ref 37–54)
EGFRCR SERPLBLD CKD-EPI 2021: 92.3 ML/MIN/1.73
EOSINOPHIL # BLD AUTO: 0.19 10*3/MM3 (ref 0–0.4)
EOSINOPHIL NFR BLD AUTO: 1.9 % (ref 0.3–6.2)
ERYTHROCYTE [DISTWIDTH] IN BLOOD BY AUTOMATED COUNT: 13.7 % (ref 12.3–15.4)
GLOBULIN UR ELPH-MCNC: 3.3 GM/DL
GLUCOSE SERPL-MCNC: 103 MG/DL (ref 65–99)
GLUCOSE UR STRIP-MCNC: NEGATIVE MG/DL
HCT VFR BLD AUTO: 45.4 % (ref 37.5–51)
HGB BLD-MCNC: 15 G/DL (ref 13–17.7)
HGB UR QL STRIP.AUTO: NEGATIVE
HOLD SPECIMEN: NORMAL
IMM GRANULOCYTES # BLD AUTO: 0.05 10*3/MM3 (ref 0–0.05)
IMM GRANULOCYTES NFR BLD AUTO: 0.5 % (ref 0–0.5)
INR PPP: 1.02 (ref 0.84–1.13)
KETONES UR QL STRIP: NEGATIVE
LEUKOCYTE ESTERASE UR QL STRIP.AUTO: NEGATIVE
LIPASE SERPL-CCNC: 40 U/L (ref 13–60)
LYMPHOCYTES # BLD AUTO: 2.03 10*3/MM3 (ref 0.7–3.1)
LYMPHOCYTES NFR BLD AUTO: 20.6 % (ref 19.6–45.3)
MCH RBC QN AUTO: 30.1 PG (ref 26.6–33)
MCHC RBC AUTO-ENTMCNC: 33 G/DL (ref 31.5–35.7)
MCV RBC AUTO: 91.2 FL (ref 79–97)
MONOCYTES # BLD AUTO: 0.84 10*3/MM3 (ref 0.1–0.9)
MONOCYTES NFR BLD AUTO: 8.5 % (ref 5–12)
NEUTROPHILS NFR BLD AUTO: 6.69 10*3/MM3 (ref 1.7–7)
NEUTROPHILS NFR BLD AUTO: 68 % (ref 42.7–76)
NITRITE UR QL STRIP: NEGATIVE
NRBC BLD AUTO-RTO: 0 /100 WBC (ref 0–0.2)
NT-PROBNP SERPL-MCNC: 147.4 PG/ML (ref 0–900)
PH UR STRIP.AUTO: 7 [PH] (ref 5–8)
PLATELET # BLD AUTO: 146 10*3/MM3 (ref 140–450)
PMV BLD AUTO: 10.2 FL (ref 6–12)
POTASSIUM SERPL-SCNC: 4.3 MMOL/L (ref 3.5–5.2)
PROT SERPL-MCNC: 7.7 G/DL (ref 6–8.5)
PROT UR QL STRIP: NEGATIVE
PROTHROMBIN TIME: 13.3 SECONDS (ref 11.4–14.4)
QT INTERVAL: 440 MS
QTC INTERVAL: 450 MS
RBC # BLD AUTO: 4.98 10*6/MM3 (ref 4.14–5.8)
SODIUM SERPL-SCNC: 142 MMOL/L (ref 136–145)
SP GR UR STRIP: 1.05 (ref 1–1.03)
TROPONIN T SERPL-MCNC: <0.01 NG/ML (ref 0–0.03)
UFH PPP CHRO-ACNC: 0.1 IU/ML (ref 0.3–0.7)
UFH PPP CHRO-ACNC: 0.26 IU/ML (ref 0.3–0.7)
UFH PPP CHRO-ACNC: 0.32 IU/ML (ref 0.3–0.7)
UROBILINOGEN UR QL STRIP: ABNORMAL
WBC NRBC COR # BLD: 9.85 10*3/MM3 (ref 3.4–10.8)
WHOLE BLOOD HOLD COAG: NORMAL
WHOLE BLOOD HOLD SPECIMEN: NORMAL

## 2022-10-05 PROCEDURE — 83690 ASSAY OF LIPASE: CPT | Performed by: PHYSICIAN ASSISTANT

## 2022-10-05 PROCEDURE — 85730 THROMBOPLASTIN TIME PARTIAL: CPT | Performed by: PHYSICIAN ASSISTANT

## 2022-10-05 PROCEDURE — 25010000002 HYDROMORPHONE PER 4 MG: Performed by: INTERNAL MEDICINE

## 2022-10-05 PROCEDURE — 93005 ELECTROCARDIOGRAM TRACING: CPT

## 2022-10-05 PROCEDURE — 71275 CT ANGIOGRAPHY CHEST: CPT

## 2022-10-05 PROCEDURE — 84484 ASSAY OF TROPONIN QUANT: CPT | Performed by: PHYSICIAN ASSISTANT

## 2022-10-05 PROCEDURE — 83880 ASSAY OF NATRIURETIC PEPTIDE: CPT | Performed by: PHYSICIAN ASSISTANT

## 2022-10-05 PROCEDURE — G0378 HOSPITAL OBSERVATION PER HR: HCPCS

## 2022-10-05 PROCEDURE — 25010000002 HEPARIN (PORCINE) 25000-0.45 UT/250ML-% SOLUTION: Performed by: PHYSICIAN ASSISTANT

## 2022-10-05 PROCEDURE — 71045 X-RAY EXAM CHEST 1 VIEW: CPT

## 2022-10-05 PROCEDURE — 25010000002 HYDROMORPHONE 1 MG/ML SOLUTION: Performed by: EMERGENCY MEDICINE

## 2022-10-05 PROCEDURE — 96376 TX/PRO/DX INJ SAME DRUG ADON: CPT

## 2022-10-05 PROCEDURE — 0 IOPAMIDOL PER 1 ML: Performed by: EMERGENCY MEDICINE

## 2022-10-05 PROCEDURE — 74177 CT ABD & PELVIS W/CONTRAST: CPT

## 2022-10-05 PROCEDURE — 93005 ELECTROCARDIOGRAM TRACING: CPT | Performed by: EMERGENCY MEDICINE

## 2022-10-05 PROCEDURE — 85610 PROTHROMBIN TIME: CPT | Performed by: PHYSICIAN ASSISTANT

## 2022-10-05 PROCEDURE — 85520 HEPARIN ASSAY: CPT

## 2022-10-05 PROCEDURE — 99220 PR INITIAL OBSERVATION CARE/DAY 70 MINUTES: CPT | Performed by: INTERNAL MEDICINE

## 2022-10-05 PROCEDURE — 25010000002 HEPARIN (PORCINE) PER 1000 UNITS: Performed by: PHYSICIAN ASSISTANT

## 2022-10-05 PROCEDURE — 96365 THER/PROPH/DIAG IV INF INIT: CPT

## 2022-10-05 PROCEDURE — 85520 HEPARIN ASSAY: CPT | Performed by: INTERNAL MEDICINE

## 2022-10-05 PROCEDURE — 25010000002 HYDROMORPHONE 1 MG/ML SOLUTION: Performed by: INTERNAL MEDICINE

## 2022-10-05 PROCEDURE — 85520 HEPARIN ASSAY: CPT | Performed by: PHYSICIAN ASSISTANT

## 2022-10-05 PROCEDURE — 36415 COLL VENOUS BLD VENIPUNCTURE: CPT

## 2022-10-05 PROCEDURE — 99285 EMERGENCY DEPT VISIT HI MDM: CPT

## 2022-10-05 PROCEDURE — 25010000002 ONDANSETRON PER 1 MG: Performed by: EMERGENCY MEDICINE

## 2022-10-05 PROCEDURE — 96375 TX/PRO/DX INJ NEW DRUG ADDON: CPT

## 2022-10-05 PROCEDURE — 96366 THER/PROPH/DIAG IV INF ADDON: CPT

## 2022-10-05 PROCEDURE — 81003 URINALYSIS AUTO W/O SCOPE: CPT | Performed by: PHYSICIAN ASSISTANT

## 2022-10-05 PROCEDURE — 80053 COMPREHEN METABOLIC PANEL: CPT | Performed by: PHYSICIAN ASSISTANT

## 2022-10-05 PROCEDURE — 25010000002 HYDROMORPHONE PER 4 MG: Performed by: EMERGENCY MEDICINE

## 2022-10-05 PROCEDURE — 85025 COMPLETE CBC W/AUTO DIFF WBC: CPT | Performed by: PHYSICIAN ASSISTANT

## 2022-10-05 RX ORDER — HEPARIN SODIUM 1000 [USP'U]/ML
2000 INJECTION, SOLUTION INTRAVENOUS; SUBCUTANEOUS AS NEEDED
Status: DISCONTINUED | OUTPATIENT
Start: 2022-10-05 | End: 2022-10-05 | Stop reason: DRUGHIGH

## 2022-10-05 RX ORDER — ASPIRIN 81 MG/1
81 TABLET ORAL DAILY
Status: DISCONTINUED | OUTPATIENT
Start: 2022-10-05 | End: 2022-10-07 | Stop reason: HOSPADM

## 2022-10-05 RX ORDER — ONDANSETRON 2 MG/ML
4 INJECTION INTRAMUSCULAR; INTRAVENOUS ONCE
Status: COMPLETED | OUTPATIENT
Start: 2022-10-05 | End: 2022-10-05

## 2022-10-05 RX ORDER — HYDROCODONE BITARTRATE AND ACETAMINOPHEN 7.5; 325 MG/1; MG/1
2 TABLET ORAL EVERY 4 HOURS PRN
Status: DISCONTINUED | OUTPATIENT
Start: 2022-10-05 | End: 2022-10-05

## 2022-10-05 RX ORDER — HEPARIN SODIUM 1000 [USP'U]/ML
7400 INJECTION, SOLUTION INTRAVENOUS; SUBCUTANEOUS ONCE
Status: COMPLETED | OUTPATIENT
Start: 2022-10-05 | End: 2022-10-05

## 2022-10-05 RX ORDER — NAPROXEN SODIUM 220 MG
220 TABLET ORAL 2 TIMES DAILY PRN
COMMUNITY
End: 2022-10-07 | Stop reason: HOSPADM

## 2022-10-05 RX ORDER — ONDANSETRON 4 MG/1
4 TABLET, FILM COATED ORAL EVERY 6 HOURS PRN
Status: DISCONTINUED | OUTPATIENT
Start: 2022-10-05 | End: 2022-10-07 | Stop reason: HOSPADM

## 2022-10-05 RX ORDER — SODIUM CHLORIDE 0.9 % (FLUSH) 0.9 %
10 SYRINGE (ML) INJECTION AS NEEDED
Status: DISCONTINUED | OUTPATIENT
Start: 2022-10-05 | End: 2022-10-07 | Stop reason: HOSPADM

## 2022-10-05 RX ORDER — HEPARIN SODIUM 10000 [USP'U]/100ML
20 INJECTION, SOLUTION INTRAVENOUS
Status: DISCONTINUED | OUTPATIENT
Start: 2022-10-05 | End: 2022-10-06

## 2022-10-05 RX ORDER — OXYCODONE HYDROCHLORIDE AND ACETAMINOPHEN 5; 325 MG/1; MG/1
1 TABLET ORAL ONCE
Status: DISCONTINUED | OUTPATIENT
Start: 2022-10-05 | End: 2022-10-07 | Stop reason: HOSPADM

## 2022-10-05 RX ORDER — CARVEDILOL 12.5 MG/1
12.5 TABLET ORAL 2 TIMES DAILY WITH MEALS
Status: DISCONTINUED | OUTPATIENT
Start: 2022-10-05 | End: 2022-10-06

## 2022-10-05 RX ORDER — OXYCODONE AND ACETAMINOPHEN 10; 325 MG/1; MG/1
1 TABLET ORAL EVERY 4 HOURS PRN
Status: DISCONTINUED | OUTPATIENT
Start: 2022-10-05 | End: 2022-10-07

## 2022-10-05 RX ORDER — HYDROMORPHONE HCL 110MG/55ML
2 PATIENT CONTROLLED ANALGESIA SYRINGE INTRAVENOUS EVERY 4 HOURS PRN
Status: DISCONTINUED | OUTPATIENT
Start: 2022-10-05 | End: 2022-10-07

## 2022-10-05 RX ORDER — ACETAMINOPHEN 325 MG/1
650 TABLET ORAL EVERY 4 HOURS PRN
Status: DISCONTINUED | OUTPATIENT
Start: 2022-10-05 | End: 2022-10-07 | Stop reason: HOSPADM

## 2022-10-05 RX ORDER — PANTOPRAZOLE SODIUM 40 MG/1
40 TABLET, DELAYED RELEASE ORAL
Status: DISCONTINUED | OUTPATIENT
Start: 2022-10-05 | End: 2022-10-07 | Stop reason: HOSPADM

## 2022-10-05 RX ORDER — HEPARIN SODIUM 1000 [USP'U]/ML
4000 INJECTION, SOLUTION INTRAVENOUS; SUBCUTANEOUS AS NEEDED
Status: DISCONTINUED | OUTPATIENT
Start: 2022-10-05 | End: 2022-10-05 | Stop reason: DRUGHIGH

## 2022-10-05 RX ORDER — HYDROMORPHONE HYDROCHLORIDE 1 MG/ML
0.5 INJECTION, SOLUTION INTRAMUSCULAR; INTRAVENOUS; SUBCUTANEOUS ONCE
Status: COMPLETED | OUTPATIENT
Start: 2022-10-05 | End: 2022-10-05

## 2022-10-05 RX ORDER — SODIUM CHLORIDE 0.9 % (FLUSH) 0.9 %
10 SYRINGE (ML) INJECTION EVERY 12 HOURS SCHEDULED
Status: DISCONTINUED | OUTPATIENT
Start: 2022-10-05 | End: 2022-10-07 | Stop reason: HOSPADM

## 2022-10-05 RX ORDER — LOSARTAN POTASSIUM 50 MG/1
50 TABLET ORAL
Status: DISCONTINUED | OUTPATIENT
Start: 2022-10-05 | End: 2022-10-07

## 2022-10-05 RX ADMIN — HYDROMORPHONE HYDROCHLORIDE 0.5 MG: 1 INJECTION, SOLUTION INTRAMUSCULAR; INTRAVENOUS; SUBCUTANEOUS at 09:42

## 2022-10-05 RX ADMIN — ASPIRIN 81 MG: 81 TABLET, COATED ORAL at 16:07

## 2022-10-05 RX ADMIN — HYDROMORPHONE HYDROCHLORIDE 1 MG: 1 INJECTION, SOLUTION INTRAMUSCULAR; INTRAVENOUS; SUBCUTANEOUS at 08:04

## 2022-10-05 RX ADMIN — HYDROCODONE BITARTRATE AND ACETAMINOPHEN 2 TABLET: 7.5; 325 TABLET ORAL at 16:07

## 2022-10-05 RX ADMIN — HYDROMORPHONE HYDROCHLORIDE 1 MG: 1 INJECTION, SOLUTION INTRAMUSCULAR; INTRAVENOUS; SUBCUTANEOUS at 12:34

## 2022-10-05 RX ADMIN — HYDROMORPHONE HYDROCHLORIDE 2 MG: 2 INJECTION, SOLUTION INTRAMUSCULAR; INTRAVENOUS; SUBCUTANEOUS at 22:59

## 2022-10-05 RX ADMIN — HYDROMORPHONE HYDROCHLORIDE 2 MG: 2 INJECTION, SOLUTION INTRAMUSCULAR; INTRAVENOUS; SUBCUTANEOUS at 17:44

## 2022-10-05 RX ADMIN — LOSARTAN POTASSIUM 50 MG: 50 TABLET, FILM COATED ORAL at 16:07

## 2022-10-05 RX ADMIN — HEPARIN SODIUM 7400 UNITS: 1000 INJECTION INTRAVENOUS; SUBCUTANEOUS at 10:27

## 2022-10-05 RX ADMIN — Medication 10 ML: at 21:37

## 2022-10-05 RX ADMIN — SODIUM CHLORIDE 1000 ML: 9 INJECTION, SOLUTION INTRAVENOUS at 08:04

## 2022-10-05 RX ADMIN — OXYCODONE HYDROCHLORIDE AND ACETAMINOPHEN 1 TABLET: 10; 325 TABLET ORAL at 19:51

## 2022-10-05 RX ADMIN — HEPARIN SODIUM 16.15 UNITS/KG/HR: 10000 INJECTION, SOLUTION INTRAVENOUS at 10:29

## 2022-10-05 RX ADMIN — ONDANSETRON 4 MG: 2 INJECTION INTRAMUSCULAR; INTRAVENOUS at 08:04

## 2022-10-05 RX ADMIN — IOPAMIDOL 150 ML: 755 INJECTION, SOLUTION INTRAVENOUS at 08:50

## 2022-10-05 NOTE — H&P
"    [facility] Medicine Services  HISTORY AND PHYSICAL    Patient Name: Eliazar Morley  : 1960  MRN: [id number]  Primary Care Physician: Provider, No Known  Date of admission: 10/5/2022      Subjective   Subjective     Chief Complaint:  pain    HPI:  Eliazar Morley is a 62 y.o. male with history of metastatic adenocarcinoma of the lung, HTN, HLD, T2DM who presents to the [facility] ED today with pain. Patient reports that he has had diffuse pain in his spine, R shoulder for some time. Patient attributes the shoulder pain to a previous biopsy he had almost a year ago with his oncologist. He feels this created nerve damage. He has had trouble with his PCP leaving and he no longer has f/u per patient report- he also no longer wishes to f/u with his oncologist as he does not want chemotherapy/treatment and did not care to go back to see his oncologist. He has a new appointment with a PCP on Friday of this week with Dr Miller, he also has placed a call to pain management clinic in [place] but reports he will need a referral from his new PCP. Patient essentially reports having significant difficulty with pain over the last few months- he was in the ED last week and received a 3-day supply of percocet but this only helped minimally and did not last long. He reports \"popping aleve like candy\" at home with still poorly controlled pain.     Oncologic history- previous history of left lobectomy and 8 rounds of chemotherapy. Previously followed with Dr Haro but has transitioned care to Dr Mccoy in [place] however has not seen any oncologist in months. Per ED documentation He then stopped treatment and going to appointments in July because \"I just didn't trust that hospital\" and \"I couldn't stand the IVs\".     Patient was ultimately found in the ED on CT imaging w/u to have \"Acute pulmonary emboli at the right main pulmonary artery extending into right upper lobe, right lower lobe and right " "middle lobe segmental pulmonary arteries. No findings of right heart strain.\"    Patient also noted to have metastatic findings on imaging and pulmonary nodules as well as hilar metastatic adenopathy.       Review of Systems   Gen- No fevers, chills  CV- No chest pain, palpitations  Resp- No cough, dyspnea  GI- No N/V/D, abd pain    +diffuse pain  All other systems reviewed and are negative.     Personal History     Past Medical History:   Diagnosis Date   • Adenocarcinoma of lung (HCC) 07/2020   • Anticoagulated    • Appendicitis    • Coronary arteriosclerosis in native artery    • Coronary artery disease    • Elevated blood pressure reading    • Esophageal reflux    • Fracture    • Fractures    • GERD (gastroesophageal reflux disease)    • Hearing disorder    • Heart murmur    • Hypercholesterolemia    • Hyperlipidemia    • Hypertension    • Lung mass     lung biopsy today 7/31/2020   • Mixed hyperlipidemia    • Prediabetes    • Tendon sheath disorder     INJECTION TENDON SHEATH, LIGAMENT         Oncology Problem List:  Malignant neoplasm of lung (HCC) (09/23/2020; Status: Active)  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     Adenocarcinoma of left lung (HCC)   8/27/2020 Initial Diagnosis    Adenocarcinoma of left lung (CMS/HCC)     8/27/2020 Cancer Staged    Staging form: Lung, AJCC 8th Edition  - Clinical: Stage IIB (cT3, cN0, cM0) - Signed by Daniel Haro MD on 8/27/2020 9/23/2020 Surgery    Surgery       Left upper lobectomy pathology showed grade 2 mucinous adenocarcinoma, 6.7 cm, 0 out of 3 peribronchial nodes, 0 " out of 1 level 9, 0 out of 1 level 6, 0 out of 1 level 7, 0 out of 1 level 8 nodes.     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)   9/14/2020 Initial Diagnosis    Malignant neoplasm of upper lobe of left lung (CMS/HCC)     9/23/2020 Surgery    Surgery       Left upper lobectomy showed grade 2 mucinous adenocarcinoma   6.7 cm, 0 out of 3 peribronchial nodes, 0 out of 1 level 9 nodes, 0 out of 1 level 6 nodes, 0 out of 1 level 7 nodes, 0 out of 1 level 8 nodes    Pathological stage T3 N0 M0 stage IIb     9/25/2020 Cancer Staged    Staging form: Lung, AJCC 8th Edition  - Pathologic: Stage IIB (pT3, pN0, cM0) - Signed by Daniel Haro MD on 9/25/2020     10/23/2020 -  Chemotherapy    OP LUNG PEMEtrexed / CISplatin         Past Surgical History:   Procedure Laterality Date   • AMPUTATION FINGER / THUMB Right    • APPENDECTOMY     • BRONCHOSCOPY THORACOTOMY Left 09/23/2020    Procedure: BRONCHOSCOPY LEFT THORACOTOMY LEFT UPPER LOBECTOMY, MEDIASTINAL LYMPH NODE DISSECTION;  Surgeon: Luis Booth MD;  [facility] OR;  Service: Cardiothoracic;  Laterality: Left;   • CARDIAC CATHETERIZATION     • CARDIAC CATHETERIZATION N/A 08/17/2018    Procedure: Left Heart Cath;  Surgeon: Janes Rosen MD;  Location:  [facility]ATH IN[facility]ASIVE LOCATION;  Service: Cardiovascular   • CAROTID STENT  2009    3 STENTS   • COLONOSCOPY     • CORONARY STENT PLACEMENT      x8   • TIBIA FRACTURE SURGERY Bilateral    • TRIGGER FINGER RELEASE Bilateral        Family History:  family history includes Alzheimer's disease in his father; Asthma in his sister; Cancer in his mother and another family member; Cirrhosis in his sister; Depression in his brother and sister; Emphysema in his mother; Hearing loss in his father; Heart attack in his paternal grandmother; Hypertension in  his sister; Osteoarthritis in his brother, father, and mother; Other (age of onset: 26) in his brother; Rheum arthritis in his brother; Stroke in his father and mother. Otherwise pertinent FHx was reviewed and unremarkable.     Social History:  reports that he quit smoking about 2 years ago. His smoking use included cigarettes. He has a 45.00 pack-year smoking history. He has never used smokeless tobacco. He reports current alcohol use of about 6.0 standard drinks of alcohol per week. He reports that he does not use drugs.  Social History     Social History Narrative    Lives in [place], [place] alone        Medications:  Available home medication information reviewed.  (Not in a hospital admission)      No Known Allergies    Objective   Objective     Vital Signs:   Temp:  [97.6 °F (36.4 °C)] 97.6 °F (36.4 °C)  Heart Rate:  [56-63] 60  Resp:  [20-22] 20  BP: (120-146)/(49-72) 126/64       Physical Exam   GEN: NAD, resting in bed, awake  HEENT: on NC, atraumatic, normocephalic  NECK: supple, no masses  RESP: on NC, effort normal  CV: on tele, sinus rhythm  PSYCH: normal affect, appropriate  NEURO: awake, alert, no focal deficits noted  MSK: no edema noted  SKIN: no rashes noted       Result Review:  I have personally reviewed the results from the time of this admission to 10/5/2022 12:29 EDT and agree with these findings:  [x]  Laboratory list / accordion  [x]  Microbiology  [x]  Radiology  []  EKG/Telemetry   []  Cardiology/Vascular   [x]  Pathology  [x]  Old records  []  Other:  Most notable findings include:     See hpi        LAB RESULTS:      Lab 10/05/22  1021 10/05/22  0710   WBC  --  9.85   HEMOGLOBIN  --  15.0   HEMATOCRIT  --  45.4   PLATELETS  --  146   NEUTROS ABS  --  6.69   IMMATURE GRANS (ABS)  --  0.05   LYMPHS ABS  --  2.03   MONOS ABS  --  0.84   EOS ABS  --  0.19   MCV  --  91.2   PROTIME  --  13.3   INR  --  1.02   APTT  --  36.7*   HEPARIN ANTI-XA 0.10*  --          Lab 10/05/22  0710   SODIUM  142   POTASSIUM 4.3   CHLORIDE 103   CO2 24.0   ANION GAP 15.0   BUN 19   CREATININE 0.93   EGFR 92.3   GLUCOSE 103*   CALCIUM 10.1         Lab 10/05/22  0710   TOTAL PROTEIN 7.7   ALBUMIN 4.40   GLOBULIN 3.3   ALT (SGPT) 18   AST (SGOT) 22   BILIRUBIN 0.5   ALK PHOS 176*   LIPASE 40         Lab 10/05/22  0710   PROBNP 147.4   TROPONIN T <0.010                 UA    Urinalysis 10/5/22   Specific [place], UA 1.054 (A)   Ketones, UA Negative   Blood, UA Negative   Leukocytes, UA Negative   Nitrite, UA Negative   (A) Abnormal value              Microbiology Results (last 10 days)     ** No results found for the last 240 hours. **          CT Abdomen Pelvis With Contrast    Result Date: 10/5/2022  DATE OF EXAM: 10/5/2022 8:32 AM  PROCEDURE: CT ANGIOGRAM CHEST-, CT ABDOMEN PELVIS W CONTRAST-  INDICATIONS: lung cancer with mets, coughing up blood  COMPARISON: CT chest abdomen and pelvis 10/22/2020  TECHNIQUE: Contiguous axial imaging was obtained of the chest, abdomen, and pelvis following the intravenous administration of 150 mL of Isovue 370. Reconstructed coronal and sagittal images were also obtained. Automated exposure control and iterative reconstruction methods were used.  The radiation dose reduction device was turned on for each scan per the ALARA (As Low as Reasonably Achievable) protocol.  FINDINGS: Chest: Soft tissues of the lower neck without acute abnormality. Right supraclavicular node measures 10 mm on image 12. Heart size normal. Extensive, three-vessel coronary artery calcifications. There are pulmonary emboli at the right main pulmonary artery bifurcation extending into right upper lobe anterior segment pulmonary artery, right middle lobar pulmonary artery, and the superior segment right lower lobe pulmonary artery. Negative for pulmonary embolus on left. Intraventricular septum is normal in position without findings to indicate right heart strain. Right hilar nodes enlarged measuring 1.3 cm on image  45. Additional right hilar tissue anterior to right mainstem bronchus measures 2.2 cm on image 42.  Postsurgical changes of left upper lobectomy. No pneumothorax. Moderate emphysema. Multiple pulmonary masses and nodules most consistent with pulmonary metastatic disease for example involving right upper lobe abutting the pleura measuring 4.5 x 3.2 cm on image 35. Involving the right lower lobe measuring 4.4 x 3.9 cm on image 63. Additional nodule in the right middle lobe measures 8 mm on image 58. Right lower lobe nodules measure 2 cm on image 66 and 1 cm on image 67. Multiple pulmonary nodules also noted within the left lower lobe for example anteriorly abutting pleura measuring 2.1 cm on image 36. Additional left lower lobe nodule measures 9 mm on image 44. Multiple other smaller nodules noted bilaterally.  There are multifocal mixed lytic and sclerotic osseous lesions consistent with osseous metastasis, new from comparison exam for example involving the C7 vertebral body. Mixed lytic and sclerotic lesion involving the T1 vertebral body and the right T1 pedicle and transverse process. Additional sclerotic lesion at the posterior aspect of T2 and T3. Additional small area of sclerosis involving the superior endplate of T12. Small sclerotic lesion at T8 level measuring 6 mm. Chronic deformities of left posterior sixth and seventh ribs likely related to prior thoracotomy.  Abdomen and pelvis: The liver is normal in size and contour. Spleen borderline enlarged. Normal adrenal glands. Pancreas without findings of pancreatitis. The gallbladder is present. No pericholecystic inflammation. Kidneys demonstrate symmetric enhancement. No obstructive uropathy. Urinary bladder is thin walled. Prostate borderline enlarged.  Negative for pneumoperitoneum. Sigmoid diverticulosis. No bowel obstruction. No findings of appendicitis. Stomach and proximal small bowel normally configured. The portal vein, splenic vein, and superior  mesenteric vein are patent. Moderate atherosclerotic calcifications of the abdominal aorta and branch vasculature. No fluid collection the abdomen or pelvis.  There are multiple osseous lesions consistent with metastatic disease for example sclerotic lesion involving the superior plate of L1 measuring 1.9 cm. Lytic lesion involving L2 vertebral body measuring 2.5 cm with mild height loss at the superior endplate. No retropulsed fracture fragment. Lytic lesion involving left anterior acetabulum measuring 2.5 cm. Small sclerotic lesions noted in the medial left iliac bone.      Impression: Chest: 1. Acute pulmonary emboli at the right main pulmonary artery extending into right upper lobe, right lower lobe and right middle lobe segmental pulmonary arteries. No findings of right heart strain. 2. Multiple bilateral pulmonary nodules and masses largest in right upper lobe measuring 4.5 cm and right lower lobe measuring 4.4 cm consistent with pulmonary metastatic disease. 3. Mixed lytic and sclerotic osseous metastases most pronounced at  T1. 4. Enlarged right hilar lymph nodes likely hilar metastatic adenopathy. 5. Emphysema with postsurgical changes of prior left upper lobectomy. 6. Coronary artery calcifications and additional chronic findings above.  Abdomen and pelvis: 1. No acute abnormality in the abdomen or pelvis. 2. Mixed lytic and sclerotic osseous lesions consistent with osseous metastases as above most pronounced at L2 and left anterior acetabulum. 3. Sigmoid diverticulosis without diverticulitis. 4. Additional chronic findings above.  I discussed findings with Dr. Suggs at 9:29 a.m. on 10/5/2022.  This report was finalized on 10/5/2022 9:31 AM by Srinath Rogers MD.      XR Chest 1 View    Result Date: 10/5/2022   DATE OF EXAM: 10/5/2022 8:04 AM  PROCEDURE: XR CHEST 1 VW-  INDICATIONS: pain  COMPARISON: 10/01/2020  TECHNIQUE: Portable chest radiograph.  FINDINGS: Patient is rotated slightly to the left. Stable  postoperative changes of the left hemithorax. Coronary stent noted. No pneumothorax. Chronic blunting at the left lateral costophrenic angle. There is nodular mass/consolidation overlying the periphery of the right lung which may relate to pneumonia could relate to pulmonary mass. Osseous structures are grossly intact.      Impression: Airspace disease overlying the periphery of the right midlung may relate to pulmonary mass or pneumonia. Recommend correlation with clinical findings and close follow-up to ensure resolution. Otherwise consider CT for further assessment.  Stable postsurgical changes of the left hemithorax.  This report was finalized on 10/5/2022 8:20 AM by Srinath Rogers MD.      CT Angiogram Chest    Result Date: 10/5/2022  DATE OF EXAM: 10/5/2022 8:32 AM  PROCEDURE: CT ANGIOGRAM CHEST-, CT ABDOMEN PELVIS W CONTRAST-  INDICATIONS: lung cancer with mets, coughing up blood  COMPARISON: CT chest abdomen and pelvis 10/22/2020  TECHNIQUE: Contiguous axial imaging was obtained of the chest, abdomen, and pelvis following the intravenous administration of 150 mL of Isovue 370. Reconstructed coronal and sagittal images were also obtained. Automated exposure control and iterative reconstruction methods were used.  The radiation dose reduction device was turned on for each scan per the ALARA (As Low as Reasonably Achievable) protocol.  FINDINGS: Chest: Soft tissues of the lower neck without acute abnormality. Right supraclavicular node measures 10 mm on image 12. Heart size normal. Extensive, three-vessel coronary artery calcifications. There are pulmonary emboli at the right main pulmonary artery bifurcation extending into right upper lobe anterior segment pulmonary artery, right middle lobar pulmonary artery, and the superior segment right lower lobe pulmonary artery. Negative for pulmonary embolus on left. Intraventricular septum is normal in position without findings to indicate right heart strain. Right  hilar nodes enlarged measuring 1.3 cm on image 45. Additional right hilar tissue anterior to right mainstem bronchus measures 2.2 cm on image 42.  Postsurgical changes of left upper lobectomy. No pneumothorax. Moderate emphysema. Multiple pulmonary masses and nodules most consistent with pulmonary metastatic disease for example involving right upper lobe abutting the pleura measuring 4.5 x 3.2 cm on image 35. Involving the right lower lobe measuring 4.4 x 3.9 cm on image 63. Additional nodule in the right middle lobe measures 8 mm on image 58. Right lower lobe nodules measure 2 cm on image 66 and 1 cm on image 67. Multiple pulmonary nodules also noted within the left lower lobe for example anteriorly abutting pleura measuring 2.1 cm on image 36. Additional left lower lobe nodule measures 9 mm on image 44. Multiple other smaller nodules noted bilaterally.  There are multifocal mixed lytic and sclerotic osseous lesions consistent with osseous metastasis, new from comparison exam for example involving the C7 vertebral body. Mixed lytic and sclerotic lesion involving the T1 vertebral body and the right T1 pedicle and transverse process. Additional sclerotic lesion at the posterior aspect of T2 and T3. Additional small area of sclerosis involving the superior endplate of T12. Small sclerotic lesion at T8 level measuring 6 mm. Chronic deformities of left posterior sixth and seventh ribs likely related to prior thoracotomy.  Abdomen and pelvis: The liver is normal in size and contour. Spleen borderline enlarged. Normal adrenal glands. Pancreas without findings of pancreatitis. The gallbladder is present. No pericholecystic inflammation. Kidneys demonstrate symmetric enhancement. No obstructive uropathy. Urinary bladder is thin walled. Prostate borderline enlarged.  Negative for pneumoperitoneum. Sigmoid diverticulosis. No bowel obstruction. No findings of appendicitis. Stomach and proximal small bowel normally configured.  The portal vein, splenic vein, and superior mesenteric vein are patent. Moderate atherosclerotic calcifications of the abdominal aorta and branch vasculature. No fluid collection the abdomen or pelvis.  There are multiple osseous lesions consistent with metastatic disease for example sclerotic lesion involving the superior plate of L1 measuring 1.9 cm. Lytic lesion involving L2 vertebral body measuring 2.5 cm with mild height loss at the superior endplate. No retropulsed fracture fragment. Lytic lesion involving left anterior acetabulum measuring 2.5 cm. Small sclerotic lesions noted in the medial left iliac bone.      Impression: Chest: 1. Acute pulmonary emboli at the right main pulmonary artery extending into right upper lobe, right lower lobe and right middle lobe segmental pulmonary arteries. No findings of right heart strain. 2. Multiple bilateral pulmonary nodules and masses largest in right upper lobe measuring 4.5 cm and right lower lobe measuring 4.4 cm consistent with pulmonary metastatic disease. 3. Mixed lytic and sclerotic osseous metastases most pronounced at  T1. 4. Enlarged right hilar lymph nodes likely hilar metastatic adenopathy. 5. Emphysema with postsurgical changes of prior left upper lobectomy. 6. Coronary artery calcifications and additional chronic findings above.  Abdomen and pelvis: 1. No acute abnormality in the abdomen or pelvis. 2. Mixed lytic and sclerotic osseous lesions consistent with osseous metastases as above most pronounced at L2 and left anterior acetabulum. 3. Sigmoid diverticulosis without diverticulitis. 4. Additional chronic findings above.  I discussed findings with Dr. Suggs at 9:29 a.m. on 10/5/2022.  This report was finalized on 10/5/2022 9:31 AM by Srinath Rogers MD.            Assessment & Plan   Assessment & Plan     Active Hospital Problems    Diagnosis  POA   • Other acute pulmonary embolism without acute cor pulmonale (HCC) [I26.99]  Yes       Eliazar Morley  "is a 62 y.o. male with history of metastatic adenocarcinoma of the lung, HTN, HLD, T2DM who presents to the [facility] ED today with pain. Patient was ultimately found in the ED on CT imaging w/u to have \"Acute pulmonary emboli at the right main pulmonary artery extending into right upper lobe, right lower lobe and right middle lobe segmental pulmonary arteries. No findings of right heart strain.\"      Acute PE in setting of metastatic cancer  Hypoxia  --started on heparin in ED- continue but would favor transition to NOAC soon  --ECHO, no RH strain on CT imaging  --suspect related to malignancy as above, likely will need lifelong AC  --place on 1L in ED, wean oxygen as tolerated      Adenocarcinoma of lung, metastatic  Bony mets, lung nodules, metastatic adenopathy  Chronic pain related to above  --patient reported poor pain control and would benefit significantly from f/u and outpatient management of his pain. Will place on IV dilaudid here for now given acute nature of his pain, but have also d/w him palliative consult and ultimately maybe hospice referral given his terminal illness. He is agreeable to this   --he does not want oncology consultation as he is set that he does not wish for any further treatment at this point  --PCP f/u set for Friday, hopefully patient can achieve pain control and be discharged in time to make this appointment     HTN  --continue home meds     HLD  --on home repatha         DVT prophylaxis: heparin      CODE STATUS:    Code Status and Medical Interventions:   Ordered at: 10/05/22 1228     Medical Intervention Limits:    NO intubation (DNI)    NO vasopressors    NO dialysis     Code Status (Patient has no pulse and is not breathing):    No CPR (Do Not Attempt to Resuscitate)     Medical Interventions (Patient has pulse or is breathing):    Limited Support     Comments:    d/w patient         Chica Stacy MD  10/05/22    "

## 2022-10-05 NOTE — ED PROVIDER NOTES
"Subjective   History of Present Illness  Pt is a 61 yo male presneting to ED with complaints of pain. PMHx significant for CAD, HTN, HLD, DM (non insulin) and Lung cancer. Pt reports being diagnosed with lung cancer about 2 years ago and had initially been followed by Dr. Haro and then switched to Dr. Zuñiga in [place]. He reports having a left lobectomy and going through 8 rounds of chemo. He then stopped treatment and going to appointments in July because \"I just didn't trust that hospital\" and \"I couldn't stand the IVs\". He ran out of pain meds several weeks ago and came to ED 9-30-22 and was given Rx to go home but has now run out again. He has an appointment with a new PCP Dr. Miller this Friday. He reports \"pain since I was diagnosed with cancer\" but reports worsening right sided chest pain, back pain, abdominal pain and left hip pain. He reports coughing up blood the last few weeks but denies SOB. He denies fever, chills, headache, dizziness, N/V/D or urinary sx. He uses tobacco but denies ETOH or drug use.     History provided by:  Medical records and patient      Review of Systems   Constitutional: Negative for chills and fever.   HENT: Negative for congestion, sore throat and trouble swallowing.    Eyes: Negative for visual disturbance.   Respiratory: Positive for cough. Negative for shortness of breath.    Cardiovascular: Positive for chest pain. Negative for leg swelling.   Gastrointestinal: Positive for abdominal pain. Negative for constipation, diarrhea, nausea and vomiting.   Genitourinary: Negative for difficulty urinating, dysuria and flank pain.   Musculoskeletal: Positive for arthralgias and back pain.   Skin: Negative.  Negative for rash and wound.   Allergic/Immunologic: Negative.    Neurological: Negative for dizziness, syncope, weakness, numbness and headaches.   Psychiatric/Behavioral: Negative for confusion.   All other systems reviewed and are negative.      Past Medical History: "   Diagnosis Date   • Adenocarcinoma of lung (HCC) 07/2020   • Anticoagulated    • Appendicitis    • Coronary arteriosclerosis in native artery    • Coronary artery disease    • Elevated blood pressure reading    • Esophageal reflux    • Fracture    • Fractures    • GERD (gastroesophageal reflux disease)    • Hearing disorder    • Heart murmur    • Hypercholesterolemia    • Hyperlipidemia    • Hypertension    • Lung mass     lung biopsy today 7/31/2020   • Mixed hyperlipidemia    • Prediabetes    • Tendon sheath disorder     INJECTION TENDON SHEATH, LIGAMENT       No Known Allergies    Past Surgical History:   Procedure Laterality Date   • AMPUTATION FINGER / THUMB Right    • APPENDECTOMY     • BRONCHOSCOPY THORACOTOMY Left 09/23/2020    Procedure: BRONCHOSCOPY LEFT THORACOTOMY LEFT UPPER LOBECTOMY, MEDIASTINAL LYMPH NODE DISSECTION;  Surgeon: Luis Booth MD;  Location:  [facility];  Service: Cardiothoracic;  Laterality: Left;   • CARDIAC CATHETERIZATION     • CARDIAC CATHETERIZATION N/A 08/17/2018    Procedure: Left Heart Cath;  Surgeon: Janes Rosen MD;  Location:  [facility];  Service: Cardiovascular   • CAROTID STENT  2009    3 STENTS   • COLONOSCOPY     • CORONARY STENT PLACEMENT      x8   • TIBIA FRACTURE SURGERY Bilateral    • TRIGGER FINGER RELEASE Bilateral        Family History   Problem Relation Age of Onset   • Emphysema Mother    • Osteoarthritis Mother    • Stroke Mother    • Cancer Mother    • Alzheimer's disease Father    • Stroke Father    • Osteoarthritis Father    • Hearing loss Father    • Depression Brother    • Osteoarthritis Brother    • Rheum arthritis Brother    • Other Brother 26        HIP REPLACED   • Cancer Other    • Heart attack Paternal Grandmother    • Hypertension Sister    • Cirrhosis Sister    • Asthma Sister    • Depression Sister        Social History     Socioeconomic History   • Marital status:    • Number of children: 1   Tobacco Use    • Smoking status: Former Smoker     Packs/day: 1.00     Years: 45.00     Pack years: 45.00     Types: Cigarettes     Quit date: 2020     Years since quittin.2   • Smokeless tobacco: Never Used   Vaping Use   • Vaping Use: Never used   Substance and Sexual Activity   • Alcohol use: Yes     Alcohol/week: 6.0 standard drinks     Types: 6 Cans of beer per week     Comment: Rarely   • Drug use: No   • Sexual activity: Defer           Objective   Physical Exam  Vitals and nursing note reviewed.   Constitutional:       Appearance: He is well-developed.   HENT:      Head: Atraumatic.      Nose: Nose normal.   Eyes:      General: Lids are normal.      Conjunctiva/sclera: Conjunctivae normal.      Pupils: Pupils are equal, round, and reactive to light.   Cardiovascular:      Rate and Rhythm: Normal rate and regular rhythm.      Heart sounds: Normal heart sounds.   Pulmonary:      Effort: Pulmonary effort is normal.      Breath sounds: Normal breath sounds.   Abdominal:      General: There is no distension.      Palpations: Abdomen is soft.      Tenderness: There is generalized abdominal tenderness. There is no right CVA tenderness, left CVA tenderness, guarding or rebound.   Musculoskeletal:         General: No deformity. Normal range of motion.      Cervical back: Normal range of motion and neck supple. Tenderness present. Normal range of motion.      Thoracic back: Tenderness present. Normal range of motion.      Lumbar back: Tenderness present. Normal range of motion.      Right hip: No tenderness. Normal range of motion.      Left hip: No tenderness. Normal range of motion.      Comments: Diffuse TTP    Skin:     General: Skin is warm and dry.   Neurological:      Mental Status: He is alert and oriented to person, place, and time.      Sensory: No sensory deficit.   Psychiatric:         Mood and Affect: Mood is anxious.         Speech: Speech normal.         Behavior: Behavior normal.         Procedures            ED Course      Discussed patient with Dr. Suggs who is agreeable with ED course and tx plan for admission. Will start Heparin in ED.     Re-examined patient and discussed results with plan for admission.     Discussed admission with hospitalist Dr. Stacy    Reviewed old records.     Recent Results (from the past 24 hour(s))   ECG 12 Lead    Collection Time: 10/05/22  7:07 AM   Result Value Ref Range    QT Interval 440 ms    QTC Interval 450 ms   Green Top (Gel)    Collection Time: 10/05/22  7:10 AM   Result Value Ref Range    Extra Tube Hold for add-ons.    Lavender Top    Collection Time: 10/05/22  7:10 AM   Result Value Ref Range    Extra Tube hold for add-on    Gold Top - SST    Collection Time: 10/05/22  7:10 AM   Result Value Ref Range    Extra Tube Hold for add-ons.    Gray Top    Collection Time: 10/05/22  7:10 AM   Result Value Ref Range    Extra Tube Hold for add-ons.    Light Blue Top    Collection Time: 10/05/22  7:10 AM   Result Value Ref Range    Extra Tube Hold for add-ons.    Comprehensive Metabolic Panel    Collection Time: 10/05/22  7:10 AM    Specimen: Blood   Result Value Ref Range    Glucose 103 (H) 65 - 99 mg/dL    BUN 19 8 - 23 mg/dL    Creatinine 0.93 0.76 - 1.27 mg/dL    Sodium 142 136 - 145 mmol/L    Potassium 4.3 3.5 - 5.2 mmol/L    Chloride 103 98 - 107 mmol/L    CO2 24.0 22.0 - 29.0 mmol/L    Calcium 10.1 8.6 - 10.5 mg/dL    Total Protein 7.7 6.0 - 8.5 g/dL    Albumin 4.40 3.50 - 5.20 g/dL    ALT (SGPT) 18 1 - 41 U/L    AST (SGOT) 22 1 - 40 U/L    Alkaline Phosphatase 176 (H) 39 - 117 U/L    Total Bilirubin 0.5 0.0 - 1.2 mg/dL    Globulin 3.3 gm/dL    A/G Ratio 1.3 g/dL    BUN/Creatinine Ratio 20.4 7.0 - 25.0    Anion Gap 15.0 5.0 - 15.0 mmol/L    eGFR 92.3 >60.0 mL/min/1.73   Lipase    Collection Time: 10/05/22  7:10 AM    Specimen: Blood   Result Value Ref Range    Lipase 40 13 - 60 U/L   BNP    Collection Time: 10/05/22  7:10 AM    Specimen: Blood   Result Value Ref Range     proBNP 147.4 0.0 - 900.0 pg/mL   Troponin    Collection Time: 10/05/22  7:10 AM    Specimen: Blood   Result Value Ref Range    Troponin T <0.010 0.000 - 0.030 ng/mL   CBC Auto Differential    Collection Time: 10/05/22  7:10 AM    Specimen: Blood   Result Value Ref Range    WBC 9.85 3.40 - 10.80 10*3/mm3    RBC 4.98 4.14 - 5.80 10*6/mm3    Hemoglobin 15.0 13.0 - 17.7 g/dL    Hematocrit 45.4 37.5 - 51.0 %    MCV 91.2 79.0 - 97.0 fL    MCH 30.1 26.6 - 33.0 pg    MCHC 33.0 31.5 - 35.7 g/dL    RDW 13.7 12.3 - 15.4 %    RDW-SD 45.7 37.0 - 54.0 fl    MPV 10.2 6.0 - 12.0 fL    Platelets 146 140 - 450 10*3/mm3    Neutrophil % 68.0 42.7 - 76.0 %    Lymphocyte % 20.6 19.6 - 45.3 %    Monocyte % 8.5 5.0 - 12.0 %    Eosinophil % 1.9 0.3 - 6.2 %    Basophil % 0.5 0.0 - 1.5 %    Immature Grans % 0.5 0.0 - 0.5 %    Neutrophils, Absolute 6.69 1.70 - 7.00 10*3/mm3    Lymphocytes, Absolute 2.03 0.70 - 3.10 10*3/mm3    Monocytes, Absolute 0.84 0.10 - 0.90 10*3/mm3    Eosinophils, Absolute 0.19 0.00 - 0.40 10*3/mm3    Basophils, Absolute 0.05 0.00 - 0.20 10*3/mm3    Immature Grans, Absolute 0.05 0.00 - 0.05 10*3/mm3    nRBC 0.0 0.0 - 0.2 /100 WBC   Protime-INR    Collection Time: 10/05/22  7:10 AM    Specimen: Blood   Result Value Ref Range    Protime 13.3 11.4 - 14.4 Seconds    INR 1.02 0.84 - 1.13   aPTT    Collection Time: 10/05/22  7:10 AM    Specimen: Blood   Result Value Ref Range    PTT 36.7 (L) 60.0 - 90.0 seconds   Urinalysis With Microscopic If Indicated (No Culture) - Urine, Clean Catch    Collection Time: 10/05/22 10:01 AM    Specimen: Urine, Clean Catch   Result Value Ref Range    Color, UA Yellow Yellow, Straw    Appearance, UA Clear Clear    pH, UA 7.0 5.0 - 8.0    Specific Gravity, UA 1.054 (H) 1.001 - 1.030    Glucose, UA Negative Negative    Ketones, UA Negative Negative    Bilirubin, UA Negative Negative    Blood, UA Negative Negative    Protein, UA Negative Negative    Leuk Esterase, UA Negative Negative    Nitrite,  UA Negative Negative    Urobilinogen, UA 1.0 E.U./dL 0.2 - 1.0 E.U./dL   Heparin Anti-Xa    Collection Time: 10/05/22 10:21 AM    Specimen: Blood   Result Value Ref Range    Heparin Anti-Xa (UFH) 0.10 (L) 0.30 - 0.70 IU/ml     Note: In addition to lab results from this visit, the labs listed above may include labs taken at another facility or during a different encounter within the last 24 hours. Please correlate lab times with ED admission and discharge times for further clarification of the services performed during this visit.    CT Angiogram Chest   Final Result   Chest:   1. Acute pulmonary emboli at the right main pulmonary artery extending   into right upper lobe, right lower lobe and right middle lobe segmental   pulmonary arteries. No findings of right heart strain.   2. Multiple bilateral pulmonary nodules and masses largest in right   upper lobe measuring 4.5 cm and right lower lobe measuring 4.4 cm   consistent with pulmonary metastatic disease.   3. Mixed lytic and sclerotic osseous metastases most pronounced at  T1.   4. Enlarged right hilar lymph nodes likely hilar metastatic adenopathy.   5. Emphysema with postsurgical changes of prior left upper lobectomy.   6. Coronary artery calcifications and additional chronic findings above.       Abdomen and pelvis:   1. No acute abnormality in the abdomen or pelvis.   2. Mixed lytic and sclerotic osseous lesions consistent with osseous   metastases as above most pronounced at L2 and left anterior acetabulum.   3. Sigmoid diverticulosis without diverticulitis.   4. Additional chronic findings above.       I discussed findings with Dr. Suggs at 9:29 a.m. on 10/5/2022.        This report was finalized on 10/5/2022 9:31 AM by Srinath Rogers MD.          CT Abdomen Pelvis With Contrast   Final Result   Chest:   1. Acute pulmonary emboli at the right main pulmonary artery extending   into right upper lobe, right lower lobe and right middle lobe segmental    pulmonary arteries. No findings of right heart strain.   2. Multiple bilateral pulmonary nodules and masses largest in right   upper lobe measuring 4.5 cm and right lower lobe measuring 4.4 cm   consistent with pulmonary metastatic disease.   3. Mixed lytic and sclerotic osseous metastases most pronounced at  T1.   4. Enlarged right hilar lymph nodes likely hilar metastatic adenopathy.   5. Emphysema with postsurgical changes of prior left upper lobectomy.   6. Coronary artery calcifications and additional chronic findings above.       Abdomen and pelvis:   1. No acute abnormality in the abdomen or pelvis.   2. Mixed lytic and sclerotic osseous lesions consistent with osseous   metastases as above most pronounced at L2 and left anterior acetabulum.   3. Sigmoid diverticulosis without diverticulitis.   4. Additional chronic findings above.       I discussed findings with Dr. Suggs at 9:29 a.m. on 10/5/2022.        This report was finalized on 10/5/2022 9:31 AM by Srinath Rogers MD.          XR Chest 1 View   Final Result   Airspace disease overlying the periphery of the right midlung may relate   to pulmonary mass or pneumonia. Recommend correlation with clinical   findings and close follow-up to ensure resolution. Otherwise consider CT   for further assessment.       Stable postsurgical changes of the left hemithorax.       This report was finalized on 10/5/2022 8:20 AM by Srinath Rogers MD.            Vitals:    10/05/22 1230 10/05/22 1300 10/05/22 1330 10/05/22 1430   BP: 106/72 127/70 131/71 141/68   BP Location:  Right arm Right arm Right arm   Patient Position:  Lying Lying Lying   Pulse: 58 (!) 48 53 52   Resp:  20 20 20   Temp:       TempSrc:       SpO2: 94% 94% 96% 97%   Weight:       Height:         Medications   sodium chloride 0.9 % flush 10 mL (has no administration in time range)   sodium chloride 0.9 % flush 10 mL (has no administration in time range)   heparin 75503 units/250 mL (100 units/mL) in 0.45  % NaCl infusion (16.15 Units/kg/hr × 93 kg Intravenous Currently Infusing 10/5/22 1331)   Pharmacy to Dose Heparin (has no administration in time range)   oxyCODONE-acetaminophen (PERCOCET) 5-325 MG per tablet 1 tablet (1 tablet Oral Not Given 10/5/22 1147)   HYDROmorphone (DILAUDID) injection 2 mg (has no administration in time range)   HYDROmorphone (DILAUDID) injection 1 mg (1 mg Intravenous Given 10/5/22 0804)   ondansetron (ZOFRAN) injection 4 mg (4 mg Intravenous Given 10/5/22 0804)   sodium chloride 0.9 % bolus 1,000 mL (0 mL Intravenous Stopped 10/5/22 0942)   iopamidol (ISOVUE-370) 76 % injection 100 mL (150 mL Intravenous Given 10/5/22 0850)   HYDROmorphone (DILAUDID) injection 0.5 mg (0.5 mg Intravenous Given 10/5/22 0942)   heparin (porcine) injection 7,400 Units (7,400 Units Intravenous Given 10/5/22 1027)   HYDROmorphone (DILAUDID) injection 1 mg (1 mg Intravenous Given 10/5/22 1234)     ECG/EMG Results (last 24 hours)     Procedure Component Value Units Date/Time    ECG 12 Lead [[id number]] Collected: 10/05/22 0707     Updated: 10/05/22 0746     QT Interval 440 ms      QTC Interval 450 ms     Narrative:      Test Reason : BACK/SHOULDER PAIN  Blood Pressure :   */*   mmHG  Vent. Rate :  63 BPM     Atrial Rate :  63 BPM     P-R Int : 146 ms          QRS Dur :  86 ms      QT Int : 440 ms       P-R-T Axes :  60  58  50 degrees     QTc Int : 450 ms    Normal sinus rhythm  Normal ECG  When compared with ECG of 22-SEP-2020 14:05,  No significant change was found    Referred By: EDMD           Confirmed By:         ECG 12 Lead   Final Result   Test Reason : BACK/SHOULDER PAIN   Blood Pressure :   */*   mmHG   Vent. Rate :  63 BPM     Atrial Rate :  63 BPM      P-R Int : 146 ms          QRS Dur :  86 ms       QT Int : 440 ms       P-R-T Axes :  60  58  50 degrees      QTc Int : 450 ms      Normal sinus rhythm   Normal ECG   When compared with ECG of 22-SEP-2020 14:05,   No significant change was found    Confirmed by QIAN ZHAO MD ([id number]) on 10/5/2022 2:38:14 PM      Referred By: EDMD           Confirmed By: QIAN ZHAO MD                                               MDM    Final diagnoses:   Other acute pulmonary embolism without acute cor pulmonale (HCC)   Primary malignant neoplasm of lung metastatic to other site, unspecified laterality (HCC)   Tobacco use       ED Disposition  ED Disposition     ED Disposition   Decision to Admit    Condition   --    Comment   Level of Care: Telemetry [5]   Diagnosis: Other acute pulmonary embolism without acute cor pulmonale (HCC) [[id number]]   Admitting Physician: MARYBEL HEATH [[id number]]   Attending Physician: MARYBEL HEATH [[id number]]               No follow-up provider specified.       Medication List      No changes were made to your prescriptions during this visit.          Ryanne Leroy PA  10/05/22 1503

## 2022-10-05 NOTE — PLAN OF CARE
Goal Outcome Evaluation:         Pt complained of pain during shift, PRN given, etco2 applied for further monitoring. Heparin gtt @ 16.15u/kg/hr. Pt bradycardiac in the 50s-60s, hospitalist aware, RA.

## 2022-10-05 NOTE — PROGRESS NOTES
HEPARIN INFUSION  Eliazar Morley is a  62 y.o. male receiving heparin infusion.     Therapy for (VTE/Cardiac):   PE  Patient Weight: 93 kg stated (SOA on standing)  Initial Bolus (Y/N):   yes  Any Bolus (Y/N):   yes    Signs or Symptoms of Bleeding: new in ED  Recommend Xa every 6 hours.     VTE (PE/DVT)   Initial Bolus: 80 units/kg (Max 10,000 units)  Initial rate: 18 units/kg/hr (Max 1,500 units/hr)    Anti Xa Rebolus Infusion Hold time Change infusion Dose (Units/kg/hr) Next Anti Xa Level Due   < 0.11 50 Units/kg  (4000 Units Max) None Increase by  4 Units/kg/hr 6 hours   0.11 - 0.19 25 Units/kg  (2000 Units Max) None Increase by  3 Units/kg/hr 6 hours   0.2 - 0.29 0 None Increase by  2 Units/kg/hr 6 hours   0.3 - 0.7 0 None No Change 6 hours (after 2 consecutive levels in range check qAM)   0.71 - 0.8 0 None Decrease by  1 Units/kg/hr 6 hours   0.81 - 0.9 0 None Decrease by  2 Units/kg/hr 6 hours   0.91 - 1 0 60 Minutes Decrease by  3 Units/kg/hr 6 hours   >1 0 Hold  After Anti Xa less than 0.7 decrease previous rate by  4 Units/kg/hr  Every 2 hours until Anti Xa is less than 0.7 then when infusion restarts in 6 hours     Results from last 7 days   Lab Units 10/05/22  0710   HEMOGLOBIN g/dL 15.0   HEMATOCRIT % 45.4   PLATELETS 10*3/mm3 146          Date   Time   Anti-Xa Current Rate (Unit/kg/hr) Bolus   (Units) Rate Change   (Unit/kg/hr) New Rate (Unit/kg/hr) Next   Anti-Xa Comments  Pump Check Daily   10/5 1015 pending new 7400 +16.15 16.15 1630 Isaiah Brandon, CLARICE Tijerina, PharmD  10/5/2022  10:01 EDT

## 2022-10-06 LAB
UFH PPP CHRO-ACNC: 0.26 IU/ML (ref 0.3–0.7)
UFH PPP CHRO-ACNC: 0.32 IU/ML (ref 0.3–0.7)

## 2022-10-06 PROCEDURE — 96366 THER/PROPH/DIAG IV INF ADDON: CPT

## 2022-10-06 PROCEDURE — 97530 THERAPEUTIC ACTIVITIES: CPT

## 2022-10-06 PROCEDURE — 85520 HEPARIN ASSAY: CPT

## 2022-10-06 PROCEDURE — 96376 TX/PRO/DX INJ SAME DRUG ADON: CPT

## 2022-10-06 PROCEDURE — 99225 PR SBSQ OBSERVATION CARE/DAY 25 MINUTES: CPT | Performed by: INTERNAL MEDICINE

## 2022-10-06 PROCEDURE — 25010000002 HYDROMORPHONE PER 4 MG: Performed by: INTERNAL MEDICINE

## 2022-10-06 PROCEDURE — 25010000002 HEPARIN (PORCINE) 25000-0.45 UT/250ML-% SOLUTION

## 2022-10-06 PROCEDURE — G0378 HOSPITAL OBSERVATION PER HR: HCPCS

## 2022-10-06 PROCEDURE — 97162 PT EVAL MOD COMPLEX 30 MIN: CPT

## 2022-10-06 RX ORDER — CARVEDILOL 3.12 MG/1
3.12 TABLET ORAL 2 TIMES DAILY WITH MEALS
Status: DISCONTINUED | OUTPATIENT
Start: 2022-10-06 | End: 2022-10-07 | Stop reason: HOSPADM

## 2022-10-06 RX ORDER — MORPHINE SULFATE 15 MG/1
15 TABLET, FILM COATED, EXTENDED RELEASE ORAL EVERY 8 HOURS SCHEDULED
Status: DISCONTINUED | OUTPATIENT
Start: 2022-10-06 | End: 2022-10-07

## 2022-10-06 RX ORDER — OXYCODONE HYDROCHLORIDE 5 MG/1
10 TABLET ORAL EVERY 4 HOURS PRN
Status: DISCONTINUED | OUTPATIENT
Start: 2022-10-06 | End: 2022-10-07 | Stop reason: HOSPADM

## 2022-10-06 RX ADMIN — HEPARIN SODIUM 18 UNITS/KG/HR: 10000 INJECTION, SOLUTION INTRAVENOUS at 03:00

## 2022-10-06 RX ADMIN — HYDROMORPHONE HYDROCHLORIDE 2 MG: 2 INJECTION, SOLUTION INTRAMUSCULAR; INTRAVENOUS; SUBCUTANEOUS at 10:14

## 2022-10-06 RX ADMIN — OXYCODONE HYDROCHLORIDE AND ACETAMINOPHEN 1 TABLET: 10; 325 TABLET ORAL at 04:48

## 2022-10-06 RX ADMIN — OXYCODONE HYDROCHLORIDE AND ACETAMINOPHEN 1 TABLET: 10; 325 TABLET ORAL at 16:43

## 2022-10-06 RX ADMIN — PANTOPRAZOLE SODIUM 40 MG: 40 TABLET, DELAYED RELEASE ORAL at 16:43

## 2022-10-06 RX ADMIN — HYDROMORPHONE HYDROCHLORIDE 2 MG: 2 INJECTION, SOLUTION INTRAMUSCULAR; INTRAVENOUS; SUBCUTANEOUS at 19:37

## 2022-10-06 RX ADMIN — MORPHINE SULFATE 15 MG: 15 TABLET, FILM COATED, EXTENDED RELEASE ORAL at 21:24

## 2022-10-06 RX ADMIN — PANTOPRAZOLE SODIUM 40 MG: 40 TABLET, DELAYED RELEASE ORAL at 06:32

## 2022-10-06 RX ADMIN — OXYCODONE 10 MG: 5 TABLET ORAL at 12:36

## 2022-10-06 RX ADMIN — HYDROMORPHONE HYDROCHLORIDE 2 MG: 2 INJECTION, SOLUTION INTRAMUSCULAR; INTRAVENOUS; SUBCUTANEOUS at 06:32

## 2022-10-06 RX ADMIN — LOSARTAN POTASSIUM 50 MG: 50 TABLET, FILM COATED ORAL at 08:24

## 2022-10-06 RX ADMIN — MORPHINE SULFATE 15 MG: 15 TABLET, FILM COATED, EXTENDED RELEASE ORAL at 14:08

## 2022-10-06 RX ADMIN — Medication 10 ML: at 19:37

## 2022-10-06 RX ADMIN — OXYCODONE HYDROCHLORIDE AND ACETAMINOPHEN 1 TABLET: 10; 325 TABLET ORAL at 23:02

## 2022-10-06 RX ADMIN — APIXABAN 10 MG: 5 TABLET, FILM COATED ORAL at 16:43

## 2022-10-06 RX ADMIN — ASPIRIN 81 MG: 81 TABLET, COATED ORAL at 08:24

## 2022-10-06 RX ADMIN — Medication 10 ML: at 08:24

## 2022-10-06 RX ADMIN — HYDROMORPHONE HYDROCHLORIDE 2 MG: 2 INJECTION, SOLUTION INTRAMUSCULAR; INTRAVENOUS; SUBCUTANEOUS at 14:32

## 2022-10-06 RX ADMIN — CARVEDILOL 3.12 MG: 3.12 TABLET, FILM COATED ORAL at 16:43

## 2022-10-06 RX ADMIN — OXYCODONE HYDROCHLORIDE AND ACETAMINOPHEN 1 TABLET: 10; 325 TABLET ORAL at 08:24

## 2022-10-06 RX ADMIN — CARVEDILOL 12.5 MG: 12.5 TABLET, FILM COATED ORAL at 08:24

## 2022-10-06 RX ADMIN — HYDROMORPHONE HYDROCHLORIDE 2 MG: 2 INJECTION, SOLUTION INTRAMUSCULAR; INTRAVENOUS; SUBCUTANEOUS at 02:35

## 2022-10-06 RX ADMIN — OXYCODONE 10 MG: 5 TABLET ORAL at 22:33

## 2022-10-06 RX ADMIN — HYDROMORPHONE HYDROCHLORIDE 2 MG: 2 INJECTION, SOLUTION INTRAMUSCULAR; INTRAVENOUS; SUBCUTANEOUS at 23:30

## 2022-10-06 NOTE — PLAN OF CARE
Goal Outcome Evaluation:  Plan of Care Reviewed With: patient           Outcome Evaluation: Physical therapy consult received. PT evaluation complete. The patient independent with supine to sit, sit to stand and ambulation. Patient without increased work of breathing during mobility. Patient educated in regards to energy conservation and potential need for adaptive equipment. Patient presents near baseline for functional mobility. Currently no acute PT needs identified. At hospital discharge recommend patient return home.

## 2022-10-06 NOTE — CASE MANAGEMENT/SOCIAL WORK
Discharge Planning Assessment  [facility]     Patient Name: Eliazar Morley  MRN: [id number]  Today's Date: 10/6/2022    Admit Date: 10/5/2022    Plan: Home, possible hospice?   Discharge Needs Assessment     Row Name 10/06/22 1132       Living Environment    People in Home alone    Current Living Arrangements home    Primary Care Provided by self    Provides Primary Care For no one    Family Caregiver if Needed child(radha), adult    Family Caregiver Names Chica Hager, daughter    Quality of Family Relationships helpful;involved;supportive    Able to Return to Prior Arrangements yes       Resource/Environmental Concerns    Resource/Environmental Concerns none       Transition Planning    Patient/Family Anticipates Transition to home    Patient/Family Anticipated Services at Transition     Transportation Anticipated family or friend will provide       Discharge Needs Assessment    Equipment Currently Used at Home walker, rolling    Concerns to be Addressed denies needs/concerns at this time    Discharge Coordination/Progress Lives in [place] alone, and his daughter lives nearby and helps as needed.  He does have a walker, but he states that he does not use it.  No history of home health or advanced directive.               Discharge Plan     Row Name 10/06/22 1133       Plan    Plan Home, possible hospice?    Patient/Family in Agreement with Plan yes    Plan Comments I spoke with patient at bedside.  He states that he lives in a house alone in [place].  His daughter Chica lives 1 mile away and assists when needed.  His only DME is a walker that he states he does not use.  He denies a history of home health or an advanced directive.  He does not have a PCP, but may go home with hospice, and then his PCP will be Brandie Zuluaag.  He gets his prescriptions filled at [facility], either at the [facility] location in [place], or the one in [place].  At this time, he denies any  needs.  When I brought up that he may need oxygen prior to discharge, he stated that he didn't want it at this time and does not feel as though he needs it.  CM will continue to follow.    Final Discharge Disposition Code 01 - home or self-care              Continued Care and Services - Admitted Since 10/5/2022     Destination     Service Provider Request Status Selected Services Address Phone Fax Patient Preferred    [facility]  Pending - Request Sent N/A [address] [facility] [address] [phone] --              Expected Discharge Date and Time     Expected Discharge Date Expected Discharge Time    Oct 10, 2022          Demographic Summary    No documentation.                Functional Status     Row Name 10/06/22 1132       Functional Status    Usual Activity Tolerance good    Current Activity Tolerance good       Functional Status, IADL    Medications independent    Meal Preparation independent    Housekeeping independent    Laundry independent    Shopping independent       Mental Status    General Appearance WDL WDL               Psychosocial    No documentation.                Abuse/Neglect    No documentation.                Legal    No documentation.                Substance Abuse    No documentation.                Patient Forms    No documentation.                   Gisella Banda, RN

## 2022-10-06 NOTE — PROGRESS NOTES
HEPARIN INFUSION  Eliazar Morley is a  62 y.o. male receiving heparin infusion.     Therapy for (VTE/Cardiac):   PE  Patient Weight: 93 kg stated (SOA on standing)  Initial Bolus (Y/N): Yes  Any Bolus (Y/N): Yes   Signs or Symptoms of Bleeding: None  Recommend Xa every 6 hours.     VTE (PE/DVT)   Initial Bolus: 80 units/kg (Max 10,000 units)  Initial rate: 18 units/kg/hr (Max 1,500 units/hr)    Anti Xa Rebolus Infusion Hold time Change infusion Dose (Units/kg/hr) Next Anti Xa Level Due   < 0.11 50 Units/kg  (4000 Units Max) None Increase by  4 Units/kg/hr 6 hours   0.11 - 0.19 25 Units/kg  (2000 Units Max) None Increase by  3 Units/kg/hr 6 hours   0.2 - 0.29 0 None Increase by  2 Units/kg/hr 6 hours   0.3 - 0.7 0 None No Change 6 hours (after 2 consecutive levels in range check qAM)   0.71 - 0.8 0 None Decrease by  1 Units/kg/hr 6 hours   0.81 - 0.9 0 None Decrease by  2 Units/kg/hr 6 hours   0.91 - 1 0 60 Minutes Decrease by  3 Units/kg/hr 6 hours   >1 0 Hold  After Anti Xa less than 0.7 decrease previous rate by  4 Units/kg/hr  Every 2 hours until Anti Xa is less than 0.7 then when infusion restarts in 6 hours     Results from last 7 days   Lab Units 10/05/22  0710   INR  1.02   HEMOGLOBIN g/dL 15.0   HEMATOCRIT % 45.4   PLATELETS 10*3/mm3 146          Date   Time   Anti-Xa Current Rate (Unit/kg/hr) Bolus   (Units) Rate Change   (Unit/kg/hr) New Rate (Unit/kg/hr) Next   Anti-Xa Comments  Pump Check Daily   10/5 1015 pending new 7400 +16.15 16.15 1630 Isaiah Brandon RN   10/5 1627 0.32 16.15 -- -- 16.15 2300 D/w CLARICE Borwn   10/5 2353 0.26 16.15 -- +1.85 18 0600 Isaiah ONEIL   10/6 0819 0.26 18 -- +2 20 1500 CLARICE Marsh, [facility]  10/6/2022  09:32 EDT

## 2022-10-06 NOTE — CONSULTS
Hospice consult received. Chart reviewed. BS visit made w/ pt. /daughter @ BS. Hospice services/philosophy/GOC explained. Hospice related questions/concerns answered/addressed. Pt. states that his primary focus is pain management & comfort. Pt. /daughter are both in agreement that the pt. would like hospice @ d/c. Pt. states that he does not have a PCP. Writer did educate on the fact that the Hospice MD can follow & orders for medications can still be written, verbal understanding expressed. Family state that they can transport the pt. home. Pt. will benefit from a 7 day supply of comfort meds filled via [id number] if the pt. discharges Friday or over the weekend. Pt will need no supplies from hospice upon d/c, but will likely benefits from home 02. Hospice will continue follow. If further assistance is needed, please call [phone].

## 2022-10-06 NOTE — PLAN OF CARE
Problem: Adult Inpatient Plan of Care  Goal: Plan of Care Review  Outcome: Ongoing, Progressing  Flowsheets (Taken 10/6/2022 1752)  Progress: improving  Plan of Care Reviewed With:   patient   daughter  Goal: Patient-Specific Goal (Individualized)  Outcome: Ongoing, Progressing  Goal: Absence of Hospital-Acquired Illness or Injury  Outcome: Ongoing, Progressing  Intervention: Identify and Manage Fall Risk  Flowsheets  Taken 10/6/2022 1600  Safety Promotion/Fall Prevention:   activity supervised   assistive device/personal items within reach   clutter free environment maintained   fall prevention program maintained   nonskid shoes/slippers when out of bed   safety round/check completed  Taken 10/6/2022 1400  Safety Promotion/Fall Prevention:   safety round/check completed   nonskid shoes/slippers when out of bed   fall prevention program maintained   clutter free environment maintained   assistive device/personal items within reach  Taken 10/6/2022 1014  Safety Promotion/Fall Prevention:   activity supervised   clutter free environment maintained   assistive device/personal items within reach   fall prevention program maintained   nonskid shoes/slippers when out of bed   safety round/check completed  Taken 10/6/2022 0800  Safety Promotion/Fall Prevention:   activity supervised   fall prevention program maintained   nonskid shoes/slippers when out of bed   safety round/check completed   clutter free environment maintained   assistive device/personal items within reach  Intervention: Prevent Skin Injury  Flowsheets  Taken 10/6/2022 1600  Body Position: position changed independently  Skin Protection:   adhesive use limited   incontinence pads utilized   transparent dressing maintained   tubing/devices free from skin contact  Taken 10/6/2022 1400  Body Position: position changed independently  Skin Protection:   tubing/devices free from skin contact   transparent dressing maintained   incontinence pads utilized    "adhesive use limited  Taken 10/6/2022 1200  Skin Protection:   tubing/devices free from skin contact   transparent dressing maintained   incontinence pads utilized   adhesive use limited  Taken 10/6/2022 1014  Body Position:   position changed independently   neutral body alignment   neutral head position  Skin Protection:   adhesive use limited   incontinence pads utilized   tubing/devices free from skin contact   transparent dressing maintained  Taken 10/6/2022 0800  Body Position:   position changed independently   neutral body alignment   neutral head position  Skin Protection:   adhesive use limited   transparent dressing maintained   tubing/devices free from skin contact  Intervention: Prevent and Manage VTE (Venous Thromboembolism) Risk  Recent Flowsheet Documentation  Taken 10/6/2022 1600 by Erin Boyle RN  Activity Management: activity adjusted per tolerance  Taken 10/6/2022 1400 by Erin Boyle RN  Activity Management: activity adjusted per tolerance  Taken 10/6/2022 1014 by Erin Boyle RN  Activity Management: activity adjusted per tolerance  Taken 10/6/2022 0800 by Erin Boyle RN  Activity Management: activity adjusted per tolerance  VTE Prevention/Management: (asked for break - scd's \"too hot\") sequential compression devices off  Intervention: Prevent Infection  Recent Flowsheet Documentation  Taken 10/6/2022 1014 by Erin Boyle RN  Infection Prevention:   hand hygiene promoted   single patient room provided  Taken 10/6/2022 0800 by Erin Boyle RN  Infection Prevention:   hand hygiene promoted   rest/sleep promoted   single patient room provided  Goal: Optimal Comfort and Wellbeing  Outcome: Ongoing, Progressing  Intervention: Monitor Pain and Promote Comfort  Flowsheets  Taken 10/6/2022 1752  Pain Management Interventions:   care clustered   pain management plan reviewed with patient/caregiver   position adjusted   pillow support provided   quiet " environment facilitated   see MAR   relaxation techniques promoted  Taken 10/6/2022 1400  Pain Management Interventions:   see MAR   quiet environment facilitated   relaxation techniques promoted  Taken 10/6/2022 1236  Pain Management Interventions:   see MAR   quiet environment facilitated   relaxation techniques promoted  Taken 10/6/2022 1014  Pain Management Interventions:   care clustered   pain management plan reviewed with patient/caregiver   quiet environment facilitated   see MAR   relaxation techniques promoted   pillow support provided  Taken 10/6/2022 0800  Pain Management Interventions:   care clustered   pain management plan reviewed with patient/caregiver   pillow support provided   quiet environment facilitated   see MAR   relaxation techniques promoted   unnecessary movement minimized  Intervention: Provide Person-Centered Care  Flowsheets (Taken 10/6/2022 0800)  Trust Relationship/Rapport:   care explained   choices provided   emotional support provided   empathic listening provided   questions answered   reassurance provided   questions encouraged   thoughts/feelings acknowledged  Goal: Readiness for Transition of Care  Outcome: Ongoing, Progressing     Problem: Palliative Care  Goal: Enhanced Quality of Life  Outcome: Ongoing, Progressing  Intervention: Maximize Comfort  Recent Flowsheet Documentation  Taken 10/6/2022 1752 by Erin Boyle RN  Pain Management Interventions:   care clustered   pain management plan reviewed with patient/caregiver   position adjusted   pillow support provided   quiet environment facilitated   see MAR   relaxation techniques promoted  Taken 10/6/2022 1400 by Erin Boyle, RN  Pain Management Interventions:   see MAR   quiet environment facilitated   relaxation techniques promoted  Taken 10/6/2022 1236 by Erin Boyle, RN  Pain Management Interventions:   see MAR   quiet environment facilitated   relaxation techniques promoted  Taken 10/6/2022 1014 by  Erin Boyle, RN  Pain Management Interventions:   care clustered   pain management plan reviewed with patient/caregiver   quiet environment facilitated   see MAR   relaxation techniques promoted   pillow support provided  Taken 10/6/2022 0800 by Erin Boyle RN  Pain Management Interventions:   care clustered   pain management plan reviewed with patient/caregiver   pillow support provided   quiet environment facilitated   see MAR   relaxation techniques promoted   unnecessary movement minimized  Oral Care: other (see comments)  Intervention: Optimize Function  Recent Flowsheet Documentation  Taken 10/6/2022 0800 by Erin Boyle RN  Sleep/Rest Enhancement: awakenings minimized  Intervention: Optimize Psychosocial Wellbeing  Recent Flowsheet Documentation  Taken 10/6/2022 0800 by Erin Boyle RN  Supportive Measures:   active listening utilized   verbalization of feelings encouraged   relaxation techniques promoted   Goal Outcome Evaluation:  Plan of Care Reviewed With: patient, daughter        Progress: improving

## 2022-10-06 NOTE — PLAN OF CARE
"Goal Outcome Evaluation:  Plan of Care Reviewed With: patient        Progress: no change  Outcome Evaluation: New palliative consult for asssitance with pain management and hospice discussion per Dr. Stacy.  During palliative nursing visit, pt sitting up in bed on 2LNC.  Mildly SOA with conversation.  Pt reports that he lives alone in [place] and is able to indepently take care of himself.  His daughter Chica Hager lives a mile away and is available to help pt.  ACP ordered.  Pt. complained of protracted pain that extends through his chest to his back and down his right arm terminating at the wrist.  Pt. indicated pain ongoing for about a year now after his last biopsy.  Stated current pain regimen knocks pain from a 6 to a 5 and that the medication \"wears off\" before the next dose is due. Pt. relayed \"there is nothing else they can do for me now other than keep me comfortable\".  Attempted to broach hospice but pt did not seem receptive at this time.  Pt. denied nausea and stated he is eating 100% of his meals.  Dyspnea is ongoing for patient especially with activity.  Dr. Hong to visit patient this morning.  Palliative care to continue to follow for support, POC, GOC and pain management.      Problem: Palliative Care  Goal: Enhanced Quality of Life  Intervention: Promote Advance Care Planning  Flowsheets (Taken 10/6/2022 1509)  Life Transition/Adjustment:   palliative care initiated   palliative care discussed     "

## 2022-10-06 NOTE — CONSULTS
Provider, No Known  Consulting physician: Regis    Chief Complaint   Patient presents with   • Pain       Reason for consult: Sx mgmnt    HPI: Patient is 62-year-old male brought hospital due to increasing pain primarily in his back, chest, abdominal area.  Patient states that this has been a little bit of a longstanding issue and also reports he has been having trouble getting pain managed through both his oncology as well as primary care physician.  Patient states that at home he was taking some Percocet but is somewhat unsure how much he was taking.  States that the pain at its worst is a 10 out of 10.  Does feel that the IV Dilaudid helps but only last for about an hour or so.    Pain assesment: See above    Dyspnea: Denies    N/V: Denies    PPS: 60      Past Medical History:   Diagnosis Date   • Adenocarcinoma of lung (HCC) 07/2020   • Anticoagulated    • Appendicitis    • Coronary arteriosclerosis in native artery    • Coronary artery disease    • Elevated blood pressure reading    • Esophageal reflux    • Fracture    • Fractures    • GERD (gastroesophageal reflux disease)    • Hearing disorder    • Heart murmur    • Hypercholesterolemia    • Hyperlipidemia    • Hypertension    • Lung mass     lung biopsy today 7/31/2020   • Mixed hyperlipidemia    • Prediabetes    • Tendon sheath disorder     INJECTION TENDON SHEATH, LIGAMENT     Past Surgical History:   Procedure Laterality Date   • AMPUTATION FINGER / THUMB Right    • APPENDECTOMY     • BRONCHOSCOPY THORACOTOMY Left 09/23/2020    Procedure: BRONCHOSCOPY LEFT THORACOTOMY LEFT UPPER LOBECTOMY, MEDIASTINAL LYMPH NODE DISSECTION;  Surgeon: Luis Booth MD;  Location: [facility] OR;  Service: Cardiothoracic;  Laterality: Left;   • CARDIAC CATHETERIZATION     • CARDIAC CATHETERIZATION N/A 08/17/2018    Procedure: Left Heart Cath;  Surgeon: Janes Rosen MD;  Location: [facility] CATH INVASIVE LOCATION;  Service: Cardiovascular   • CAROTID STENT  2009    3  STENTS   • COLONOSCOPY     • CORONARY STENT PLACEMENT      x8   • TIBIA FRACTURE SURGERY Bilateral    • TRIGGER FINGER RELEASE Bilateral      Current Code Status     Date Active Code Status Order ID Comments User Context       10/5/2022 1228 No CPR (Do Not Attempt to Resuscitate) [id number]  Chica Stacy MD ED     Advance Care Planning Activity      Questions for Current Code Status     Question Answer    Code Status (Patient has no pulse and is not breathing) No CPR (Do Not Attempt to Resuscitate)    Medical Interventions (Patient has pulse or is breathing) Limited Support    Medical Intervention Limits: NO intubation (DNI)     NO vasopressors     NO dialysis    Comments d/w patient        Current Facility-Administered Medications   Medication Dose Route Frequency Provider Last Rate Last Admin   • acetaminophen (TYLENOL) tablet 650 mg  650 mg Oral Q4H PRN Chica Stacy MD       • aspirin EC tablet 81 mg  81 mg Oral Daily Chica Stacy MD   81 mg at 10/06/22 0824   • carvedilol (COREG) tablet 12.5 mg  12.5 mg Oral BID With Meals Chica Stacy MD   12.5 mg at 10/06/22 0824   • heparin [id number] units/250 mL (100 units/mL) in 0.45 % NaCl infusion  20 Units/kg/hr Intravenous Titrated Juana Ambrose RPH 18.6 mL/hr at 10/06/22 1100 20 Units/kg/hr at 10/06/22 1100   • HYDROmorphone (DILAUDID) injection 2 mg  2 mg Intravenous Q4H PRN Chica Stacy MD   2 mg at 10/06/22 1014   • losartan (COZAAR) tablet 50 mg  50 mg Oral Q24H Chica Stacy MD   50 mg at 10/06/22 0824   • Morphine (MS CONTIN) 12 hr tablet 15 mg  15 mg Oral Q8H Frandy Hong, DO       • ondansetron (ZOFRAN) tablet 4 mg  4 mg Oral Q6H PRN Chica Stacy MD       • oxyCODONE (ROXICODONE) immediate release tablet 10 mg  10 mg Oral Q4H PRN Frandy Hong DO   10 mg at 10/06/22 1236   • oxyCODONE-acetaminophen (PERCOCET)  MG per tablet 1 tablet  1 tablet Oral Q4H PRN Chica Stacy MD   1 tablet at 10/06/22 0824   •  oxyCODONE-acetaminophen (PERCOCET) 5-325 MG per tablet 1 tablet  1 tablet Oral Once Chica Stacy MD       • pantoprazole (PROTONIX) EC tablet 40 mg  40 mg Oral BID AC Chica Stacy MD   40 mg at 10/06/22 0632   • Pharmacy to Dose Heparin   Does not apply Continuous PRN Chica Stacy MD       • sodium chloride 0.9 % flush 10 mL  10 mL Intravenous PRN Chica Stacy MD       • sodium chloride 0.9 % flush 10 mL  10 mL Intravenous PRN Chica Stacy MD       • sodium chloride 0.9 % flush 10 mL  10 mL Intravenous Q12H Chica Stacy MD   10 mL at 10/06/22 0824   • sodium chloride 0.9 % flush 10 mL  10 mL Intravenous PRN Chica Stacy MD         heparin, 20 Units/kg/hr, Last Rate: 20 Units/kg/hr (10/06/22 1100)  Pharmacy to Dose Heparin,       •  acetaminophen  •  HYDROmorphone  •  ondansetron  •  oxyCODONE  •  oxyCODONE-acetaminophen  •  Pharmacy to Dose Heparin  •  sodium chloride  •  [COMPLETED] Insert peripheral IV **AND** sodium chloride  •  sodium chloride  No Known Allergies  Family History   Problem Relation Age of Onset   • Emphysema Mother    • Osteoarthritis Mother    • Stroke Mother    • Cancer Mother    • Alzheimer's disease Father    • Stroke Father    • Osteoarthritis Father    • Hearing loss Father    • Depression Brother    • Osteoarthritis Brother    • Rheum arthritis Brother    • Other Brother 26        HIP REPLACED   • Cancer Other    • Heart attack Paternal Grandmother    • Hypertension Sister    • Cirrhosis Sister    • Asthma Sister    • Depression Sister      Social History     Socioeconomic History   • Marital status:    • Number of children: 1   Tobacco Use   • Smoking status: Former Smoker     Packs/day: 1.00     Years: 45.00     Pack years: 45.00     Types: Cigarettes     Quit date: 2020     Years since quittin.2   • Smokeless tobacco: Never Used   Vaping Use   • Vaping Use: Never used   Substance and Sexual Activity   • Alcohol use: Yes     Alcohol/week: 6.0  "standard drinks     Types: 6 Cans of beer per week     Comment: Rarely   • Drug use: No   • Sexual activity: Defer     Review of Systems -all others reviewed and found negative that mentioned in the HPI      /74 (BP Location: Right arm, Patient Position: Lying)   Pulse 51   Temp 98.1 °F (36.7 °C) (Oral)   Resp 18   Ht 177.8 cm (70\")   Wt 93 kg (205 lb)   SpO2 98%   BMI 29.41 kg/m²     Intake/Output Summary (Last 24 hours) at 10/6/2022 1248  Last data filed at 10/6/2022 1100  Gross per 24 hour   Intake 1148.48 ml   Output 1150 ml   Net -1.52 ml     Physical Exam:      General Appearance:    Alert, cooperative, in no acute distress   Head:    Normocephalic, without obvious abnormality, atraumatic   Eyes:            Lids and lashes normal, conjunctivae and sclerae normal, no   icterus, no pallor, corneas clear, PERRLA   Ears:    Ears appear intact with no abnormalities noted   Throat:   No oral lesions, no thrush, oral mucosa moist   Neck:   No adenopathy, supple, trachea midline, no thyromegaly, no   carotid bruit, no JVD   Back:     No kyphosis present, no scoliosis present, no skin lesions,      erythema or scars, no tenderness to percussion or                   palpation,   range of motion normal   Lungs:     Clear to auscultation,respirations regular, even and                  unlabored    Heart:    Regular rhythm and normal rate, normal S1 and S2, no            murmur, no gallop, no rub, no click   Chest Wall:    No abnormalities observed   Abdomen:     Normal bowel sounds, no masses, no organomegaly, soft        non-tender, non-distended, no guarding, no rebound                tenderness   Rectal:     Deferred   Extremities:   Moves all extremities well, no edema, no cyanosis, no             redness   Pulses:   Pulses palpable and equal bilaterally   Skin:   No bleeding, bruising or rash   Lymph nodes:   No palpable adenopathy   Neurologic:   Cranial nerves 2 - 12 grossly intact, sensation intact, " DTR       present and equal bilaterally       Results from last 7 days   Lab Units 10/05/22  0710   WBC 10*3/mm3 9.85   HEMOGLOBIN g/dL 15.0   HEMATOCRIT % 45.4   PLATELETS 10*3/mm3 146     Results from last 7 days   Lab Units 10/05/22  0710   SODIUM mmol/L 142   POTASSIUM mmol/L 4.3   CHLORIDE mmol/L 103   CO2 mmol/L 24.0   BUN mg/dL 19   CREATININE mg/dL 0.93   CALCIUM mg/dL 10.1   BILIRUBIN mg/dL 0.5   ALK PHOS U/L 176*   ALT (SGPT) U/L 18   AST (SGOT) U/L 22   GLUCOSE mg/dL 103*     Results from last 7 days   Lab Units 10/05/22  0710   SODIUM mmol/L 142   POTASSIUM mmol/L 4.3   CHLORIDE mmol/L 103   CO2 mmol/L 24.0   BUN mg/dL 19   CREATININE mg/dL 0.93   GLUCOSE mg/dL 103*   CALCIUM mg/dL 10.1     Imaging Results (Last 72 Hours)     Procedure Component Value Units Date/Time    CT Angiogram Chest [[id number]] Collected: 10/05/22 0905     Updated: 10/05/22 0934    Narrative:      DATE OF EXAM: 10/5/2022 8:32 AM     PROCEDURE: CT ANGIOGRAM CHEST-, CT ABDOMEN PELVIS W CONTRAST-     INDICATIONS: lung cancer with mets, coughing up blood     COMPARISON: CT chest abdomen and pelvis 10/22/2020     TECHNIQUE: Contiguous axial imaging was obtained of the chest, abdomen,  and pelvis following the intravenous administration of 150 mL of Isovue  370. Reconstructed coronal and sagittal images were also obtained.  Automated exposure control and iterative reconstruction methods were  used.     The radiation dose reduction device was turned on for each scan per the  ALARA (As Low as Reasonably Achievable) protocol.     FINDINGS:  Chest:  Soft tissues of the lower neck without acute abnormality. Right  supraclavicular node measures 10 mm on image 12. Heart size normal.  Extensive, three-vessel coronary artery calcifications. There are  pulmonary emboli at the right main pulmonary artery bifurcation  extending into right upper lobe anterior segment pulmonary artery, right  middle lobar pulmonary artery, and the superior segment  right lower lobe  pulmonary artery. Negative for pulmonary embolus on left.  Intraventricular septum is normal in position without findings to  indicate right heart strain. Right hilar nodes enlarged measuring 1.3 cm  on image 45. Additional right hilar tissue anterior to right mainstem  bronchus measures 2.2 cm on image 42.     Postsurgical changes of left upper lobectomy. No pneumothorax. Moderate  emphysema. Multiple pulmonary masses and nodules most consistent with  pulmonary metastatic disease for example involving right upper lobe  abutting the pleura measuring 4.5 x 3.2 cm on image 35. Involving the  right lower lobe measuring 4.4 x 3.9 cm on image 63. Additional nodule  in the right middle lobe measures 8 mm on image 58. Right lower lobe  nodules measure 2 cm on image 66 and 1 cm on image 67. Multiple  pulmonary nodules also noted within the left lower lobe for example  anteriorly abutting pleura measuring 2.1 cm on image 36. Additional left  lower lobe nodule measures 9 mm on image 44. Multiple other smaller  nodules noted bilaterally.     There are multifocal mixed lytic and sclerotic osseous lesions  consistent with osseous metastasis, new from comparison exam for example  involving the C7 vertebral body. Mixed lytic and sclerotic lesion  involving the T1 vertebral body and the right T1 pedicle and transverse  process. Additional sclerotic lesion at the posterior aspect of T2 and  T3. Additional small area of sclerosis involving the superior endplate  of T12. Small sclerotic lesion at T8 level measuring 6 mm. Chronic  deformities of left posterior sixth and seventh ribs likely related to  prior thoracotomy.     Abdomen and pelvis:  The liver is normal in size and contour. Spleen borderline enlarged.  Normal adrenal glands. Pancreas without findings of pancreatitis. The  gallbladder is present. No pericholecystic inflammation. Kidneys  demonstrate symmetric enhancement. No obstructive uropathy.  Urinary  bladder is thin walled. Prostate borderline enlarged.     Negative for pneumoperitoneum. Sigmoid diverticulosis. No bowel  obstruction. No findings of appendicitis. Stomach and proximal small  bowel normally configured. The portal vein, splenic vein, and superior  mesenteric vein are patent. Moderate atherosclerotic calcifications of  the abdominal aorta and branch vasculature. No fluid collection the  abdomen or pelvis.     There are multiple osseous lesions consistent with metastatic disease  for example sclerotic lesion involving the superior plate of L1  measuring 1.9 cm. Lytic lesion involving L2 vertebral body measuring 2.5  cm with mild height loss at the superior endplate. No retropulsed  fracture fragment. Lytic lesion involving left anterior acetabulum  measuring 2.5 cm. Small sclerotic lesions noted in the medial left iliac  bone.       Impression:      Chest:  1. Acute pulmonary emboli at the right main pulmonary artery extending  into right upper lobe, right lower lobe and right middle lobe segmental  pulmonary arteries. No findings of right heart strain.  2. Multiple bilateral pulmonary nodules and masses largest in right  upper lobe measuring 4.5 cm and right lower lobe measuring 4.4 cm  consistent with pulmonary metastatic disease.  3. Mixed lytic and sclerotic osseous metastases most pronounced at  T1.  4. Enlarged right hilar lymph nodes likely hilar metastatic adenopathy.  5. Emphysema with postsurgical changes of prior left upper lobectomy.  6. Coronary artery calcifications and additional chronic findings above.     Abdomen and pelvis:  1. No acute abnormality in the abdomen or pelvis.  2. Mixed lytic and sclerotic osseous lesions consistent with osseous  metastases as above most pronounced at L2 and left anterior acetabulum.  3. Sigmoid diverticulosis without diverticulitis.  4. Additional chronic findings above.     I discussed findings with Dr. Suggs at 9:29 a.m. on 10/5/2022.       This report was finalized on 10/5/2022 9:31 AM by Srinath Rogers MD.       CT Abdomen Pelvis With Contrast [[id number]] Collected: 10/05/22 0905     Updated: 10/05/22 0934    Narrative:      DATE OF EXAM: 10/5/2022 8:32 AM     PROCEDURE: CT ANGIOGRAM CHEST-, CT ABDOMEN PELVIS W CONTRAST-     INDICATIONS: lung cancer with mets, coughing up blood     COMPARISON: CT chest abdomen and pelvis 10/22/2020     TECHNIQUE: Contiguous axial imaging was obtained of the chest, abdomen,  and pelvis following the intravenous administration of 150 mL of Isovue  370. Reconstructed coronal and sagittal images were also obtained.  Automated exposure control and iterative reconstruction methods were  used.     The radiation dose reduction device was turned on for each scan per the  ALARA (As Low as Reasonably Achievable) protocol.     FINDINGS:  Chest:  Soft tissues of the lower neck without acute abnormality. Right  supraclavicular node measures 10 mm on image 12. Heart size normal.  Extensive, three-vessel coronary artery calcifications. There are  pulmonary emboli at the right main pulmonary artery bifurcation  extending into right upper lobe anterior segment pulmonary artery, right  middle lobar pulmonary artery, and the superior segment right lower lobe  pulmonary artery. Negative for pulmonary embolus on left.  Intraventricular septum is normal in position without findings to  indicate right heart strain. Right hilar nodes enlarged measuring 1.3 cm  on image 45. Additional right hilar tissue anterior to right mainstem  bronchus measures 2.2 cm on image 42.     Postsurgical changes of left upper lobectomy. No pneumothorax. Moderate  emphysema. Multiple pulmonary masses and nodules most consistent with  pulmonary metastatic disease for example involving right upper lobe  abutting the pleura measuring 4.5 x 3.2 cm on image 35. Involving the  right lower lobe measuring 4.4 x 3.9 cm on image 63. Additional nodule  in the right middle  lobe measures 8 mm on image 58. Right lower lobe  nodules measure 2 cm on image 66 and 1 cm on image 67. Multiple  pulmonary nodules also noted within the left lower lobe for example  anteriorly abutting pleura measuring 2.1 cm on image 36. Additional left  lower lobe nodule measures 9 mm on image 44. Multiple other smaller  nodules noted bilaterally.     There are multifocal mixed lytic and sclerotic osseous lesions  consistent with osseous metastasis, new from comparison exam for example  involving the C7 vertebral body. Mixed lytic and sclerotic lesion  involving the T1 vertebral body and the right T1 pedicle and transverse  process. Additional sclerotic lesion at the posterior aspect of T2 and  T3. Additional small area of sclerosis involving the superior endplate  of T12. Small sclerotic lesion at T8 level measuring 6 mm. Chronic  deformities of left posterior sixth and seventh ribs likely related to  prior thoracotomy.     Abdomen and pelvis:  The liver is normal in size and contour. Spleen borderline enlarged.  Normal adrenal glands. Pancreas without findings of pancreatitis. The  gallbladder is present. No pericholecystic inflammation. Kidneys  demonstrate symmetric enhancement. No obstructive uropathy. Urinary  bladder is thin walled. Prostate borderline enlarged.     Negative for pneumoperitoneum. Sigmoid diverticulosis. No bowel  obstruction. No findings of appendicitis. Stomach and proximal small  bowel normally configured. The portal vein, splenic vein, and superior  mesenteric vein are patent. Moderate atherosclerotic calcifications of  the abdominal aorta and branch vasculature. No fluid collection the  abdomen or pelvis.     There are multiple osseous lesions consistent with metastatic disease  for example sclerotic lesion involving the superior plate of L1  measuring 1.9 cm. Lytic lesion involving L2 vertebral body measuring 2.5  cm with mild height loss at the superior endplate. No  retropulsed  fracture fragment. Lytic lesion involving left anterior acetabulum  measuring 2.5 cm. Small sclerotic lesions noted in the medial left iliac  bone.       Impression:      Chest:  1. Acute pulmonary emboli at the right main pulmonary artery extending  into right upper lobe, right lower lobe and right middle lobe segmental  pulmonary arteries. No findings of right heart strain.  2. Multiple bilateral pulmonary nodules and masses largest in right  upper lobe measuring 4.5 cm and right lower lobe measuring 4.4 cm  consistent with pulmonary metastatic disease.  3. Mixed lytic and sclerotic osseous metastases most pronounced at  T1.  4. Enlarged right hilar lymph nodes likely hilar metastatic adenopathy.  5. Emphysema with postsurgical changes of prior left upper lobectomy.  6. Coronary artery calcifications and additional chronic findings above.     Abdomen and pelvis:  1. No acute abnormality in the abdomen or pelvis.  2. Mixed lytic and sclerotic osseous lesions consistent with osseous  metastases as above most pronounced at L2 and left anterior acetabulum.  3. Sigmoid diverticulosis without diverticulitis.  4. Additional chronic findings above.     I discussed findings with Dr. Suggs at 9:29 a.m. on 10/5/2022.      This report was finalized on 10/5/2022 9:31 AM by Srinath Rogers MD.       XR Chest 1 View [[id number]] Collected: 10/05/22 0818     Updated: 10/05/22 0823    Narrative:         DATE OF EXAM:   10/5/2022 8:04 AM     PROCEDURE:   XR CHEST 1 VW-     INDICATIONS:   pain     COMPARISON:  10/01/2020     TECHNIQUE:   Portable chest radiograph.     FINDINGS:   Patient is rotated slightly to the left. Stable postoperative changes of  the left hemithorax. Coronary stent noted. No pneumothorax. Chronic  blunting at the left lateral costophrenic angle. There is nodular  mass/consolidation overlying the periphery of the right lung which may  relate to pneumonia could relate to pulmonary mass. Osseous  structures  are grossly intact.       Impression:      Airspace disease overlying the periphery of the right midlung may relate  to pulmonary mass or pneumonia. Recommend correlation with clinical  findings and close follow-up to ensure resolution. Otherwise consider CT  for further assessment.     Stable postsurgical changes of the left hemithorax.     This report was finalized on 10/5/2022 8:20 AM by Srinath Rogers MD.           Impression: Metastatic lung cancer  Neoplastic pain  Neuropathic pain  Goals of care did have a brief discussion about goals of care with the patient.  Plan: States that he is not going to be proceeding with any further treatment for his cancer so I highly recommend getting hospice involved which she is okay with.  In respect to the patient's pain I want to start the patient on MS Contin as well as oxycodone.  We will make adjustments based on the patient's as needed usage.  Hopefully over the next 24 hours we will be able to start decreasing the patient's IV medication.        Frandy Hong,   10/06/22  12:48 EDT

## 2022-10-06 NOTE — THERAPY DISCHARGE NOTE
Patient Name: Eliazar Morley  : 1960    MRN: [id number]                              Today's Date: 10/6/2022       Admit Date: 10/5/2022    Visit Dx:     ICD-10-CM ICD-9-CM   1. Other acute pulmonary embolism without acute cor pulmonale (HCC)  I26.99 415.19   2. Primary malignant neoplasm of lung metastatic to other site, unspecified laterality (HCC)  C34.90 162.9   3. 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)   • Hypertension   • CAD and stents x 8 in 2018   • Type 2 diabetes mellitus with circulatory disorder, without long-term current use of insulin (HCC)   • Malignant neoplasm of lung (HCC)   • S/P L thoracotomy and JUDITH lobectomy 20   • Confusion due to pain meds   • Postoperative left chest pain   • Other acute pulmonary embolism without acute cor pulmonale (HCC)   • Pain     Past Medical History:   Diagnosis Date   • Adenocarcinoma of lung (HCC) 2020   • Anticoagulated    • Appendicitis    • Coronary arteriosclerosis in native artery    • Coronary artery disease    • Elevated blood pressure reading    • Esophageal reflux    • Fracture    • Fractures    • GERD (gastroesophageal reflux disease)    • Hearing disorder    • Heart murmur    • Hypercholesterolemia    • Hyperlipidemia    • Hypertension    • Lung mass     lung biopsy today 2020   • Mixed hyperlipidemia    • Prediabetes    • Tendon sheath disorder     INJECTION TENDON SHEATH, LIGAMENT     Past Surgical History:   Procedure Laterality Date   • AMPUTATION FINGER / THUMB Right    • APPENDECTOMY     • BRONCHOSCOPY THORACOTOMY Left 2020    Procedure: BRONCHOSCOPY LEFT THORACOTOMY LEFT UPPER LOBECTOMY, MEDIASTINAL LYMPH NODE DISSECTION;  Surgeon: Luis Booth MD;  Location: [facility];  Service: Cardiothoracic;  Laterality: Left;   • CARDIAC CATHETERIZATION     • CARDIAC  CATHETERIZATION N/A 08/17/2018    Procedure: Left Heart Cath;  Surgeon: Janes Rosen MD;  Location: [facility] CATH INVASIVE LOCATION;  Service: Cardiovascular   • CAROTID STENT  2009    3 STENTS   • COLONOSCOPY     • CORONARY STENT PLACEMENT      x8   • TIBIA FRACTURE SURGERY Bilateral    • TRIGGER FINGER RELEASE Bilateral       General Information     Row Name 10/06/22 0912          Physical Therapy Time and Intention    Document Type discharge evaluation/summary  -     Mode of Treatment physical therapy  -ML     Row Name 10/06/22 0912          General Information    Patient Profile Reviewed yes  -ML     Prior Level of Function independent:;all household mobility;community mobility;gait;transfer;ADL's;driving  -ML     Existing Precautions/Restrictions oxygen therapy device and L/min  -ML     Barriers to Rehab medically complex  -ML     Row Name 10/06/22 0912          Living Environment    People in Home alone;other (see comments)  dtr lives nearby  -ML     Row Name 10/06/22 0912          Home Main Entrance    Number of Stairs, Main Entrance one  -ML     Row Name 10/06/22 0912          Stairs Within Home, Primary    Number of Stairs, Within Home, Primary none  -ML     Row Name 10/06/22 0912          Cognition    Orientation Status (Cognition) oriented x 4  -ML     Row Name 10/06/22 0912          Safety Issues, Functional Mobility    Impairments Affecting Function (Mobility) endurance/activity tolerance;pain  -ML           User Key  (r) = Recorded By, (t) = Taken By, (c) = Cosigned By    Initials Name Provider Type    ML Joycelyn Spain Physical Therapist               Mobility     Row Name 10/06/22 0913          Bed Mobility    Bed Mobility supine-sit  -ML     Supine-Sit [place] (Bed Mobility) independent  -ML     Row Name 10/06/22 0913          Sit-Stand Transfer    Sit-Stand [place] (Transfers) independent  -ML     Row Name 10/06/22 0913          Gait/Stairs (Locomotion)    [place] Level (Gait)  independent;1 person to manage equipment  -ML     Assistive Device (Gait) other (see comments)  no AD  -ML     Distance in Feet (Gait) 40  -ML     Comment, (Gait/Stairs) Patient ambulated in room in order to use the bathroom and back to chair. Patient independent with ambulation, no gait deficits noted. No increased work of breathing with mobility.  -ML           User Key  (r) = Recorded By, (t) = Taken By, (c) = Cosigned By    Initials Name Provider Type    ML Joycelyn Spain Physical Therapist               Obj/Interventions     Row Name 10/06/22 0915          Range of Motion Comprehensive    General Range of Motion no range of motion deficits identified  -ML     Row Name 10/06/22 0915          Strength Comprehensive (MMT)    General Manual Muscle Testing (MMT) Assessment no strength deficits identified  -ML     Row Name 10/06/22 0915          Balance    Balance Assessment sitting static balance;sitting dynamic balance;sit to stand dynamic balance;standing static balance;standing dynamic balance  -ML     Static Sitting Balance independent  -ML     Dynamic Sitting Balance independent  -ML     Position, Sitting Balance unsupported;sitting edge of bed  -ML     Sit to Stand Dynamic Balance independent  -ML     Static Standing Balance independent  -ML     Dynamic Standing Balance independent  -ML     Position/Device Used, Standing Balance unsupported  -ML     Balance Interventions sitting;standing;sit to stand;supported;static;dynamic;occupation based/functional task  -ML           User Key  (r) = Recorded By, (t) = Taken By, (c) = Cosigned By    Initials Name Provider Type    Joycelyn De La Rosa Physical Therapist               Goals/Plan    No documentation.                Clinical Impression     Row Name 10/06/22 0915          Pain    Pretreatment Pain Rating 6/10  -ML     Posttreatment Pain Rating 6/10  -ML     Pain Location generalized  -ML     Pain Intervention(s) Repositioned;Ambulation/increased activity  -ML     Row  Name 10/06/22 0915          Plan of Care Review    Plan of Care Reviewed With patient  -ML     Outcome Evaluation Physical therapy consult received. PT evaluation complete. The patient independent with supint to sit, sit to stand and ambulation. Patient without increased work of breahting during mobility. Patient educated in regards to energy conservation and potential need for adaptive equipment. Patient presents near baseline for functional mobility. Currently no acute PT needs identifed. At hospital discharge recommend patient return home.  -ML     Row Name 10/06/22 0915          Therapy Assessment/Plan (PT)    Patient/Family Therapy Goals Statement (PT) return home  -ML     Criteria for Skilled Interventions Met (PT) no;no problems identified which require skilled intervention;does not meet criteria for skilled intervention  -ML     Therapy Frequency (PT) evaluation only  -ML     Row Name 10/06/22 0915          Vital Signs    Pre Patient Position Supine  -ML     Intra Patient Position Standing  -ML     Post Patient Position Sitting  -ML     Row Name 10/06/22 0915          Positioning and Restraints    Pre-Treatment Position in bed  -ML     Post Treatment Position chair  -ML     In Chair notified nsg;sitting;call light within reach;encouraged to call for assist  -ML           User Key  (r) = Recorded By, (t) = Taken By, (c) = Cosigned By    Initials Name Provider Type    ML Joycelyn Spain Physical Therapist               Outcome Measures     Row Name 10/06/22 0918          How much help from another person do you currently need...    Turning from your back to your side while in flat bed without using bedrails? 4  -ML     Moving from lying on back to sitting on the side of a flat bed without bedrails? 4  -ML     Moving to and from a bed to a chair (including a wheelchair)? 4  -ML     Standing up from a chair using your arms (e.g., wheelchair, bedside chair)? 4  -ML     Climbing 3-5 steps with a railing? 4  -ML      To walk in hospital room? 4  -ML     AM-PAC 6 Clicks Score (PT) 24  -ML     Highest level of mobility 8 --> Walked 250 feet or more  -ML     Row Name 10/06/22 0918          Functional Assessment    Outcome Measure Options AM-PAC 6 Clicks Basic Mobility (PT)  -ML           User Key  (r) = Recorded By, (t) = Taken By, (c) = Cosigned By    Initials Name Provider Type     Joycelyn Spain Physical Therapist              Physical Therapy Education                 Title: PT OT SLP Therapies (In Progress)     Topic: Physical Therapy (In Progress)     Point: Mobility training (Done)     Learning Progress Summary           Patient Acceptance, E, VU by ML at 10/6/2022 0919                   Point: Home exercise program (Not Started)     Learner Progress:  Not documented in this visit.          Point: Body mechanics (Done)     Learning Progress Summary           Patient Acceptance, E, VU by ML at 10/6/2022 0919                   Point: Precautions (Done)     Learning Progress Summary           Patient Acceptance, E, VU by ML at 10/6/2022 0919                               User Key     Initials Effective Dates Name Provider Type [facility] 04/22/21 -  Joycelyn Spain Physical Therapist PT              PT Recommendation and Plan     Plan of Care Reviewed With: patient  Outcome Evaluation: Physical therapy consult received. PT evaluation complete. The patient independent with supint to sit, sit to stand and ambulation. Patient without increased work of breahting during mobility. Patient educated in regards to energy conservation and potential need for adaptive equipment. Patient presents near baseline for functional mobility. Currently no acute PT needs identifed. At hospital discharge recommend patient return home.     Time Calculation:    PT Charges     Row Name 10/06/22 0920             Time Calculation    Start Time 0835  -ML      PT Received On 10/06/22  -ML              Timed Charges    99595 - PT Therapeutic Activity  Minutes 15  -ML              Untimed Charges    PT Eval/Re-eval Minutes 31  -ML              Total Minutes    Timed Charges Total Minutes 15  -ML      Untimed Charges Total Minutes 31  -ML       Total Minutes 46  -ML            User Key  (r) = Recorded By, (t) = Taken By, (c) = Cosigned By    Initials Name Provider Type    Joycelyn De La Rosa Physical Therapist              Therapy Charges for Today     Code Description Service Date Service Provider Modifiers Qty    58272492354 HC PT THERAPEUTIC ACT EA 15 MIN 10/6/2022 Joycelyn Spain GP 1    97936306215 HC PT EVAL MOD COMPLEXITY 3 10/6/2022 Joycelyn Spain GP 1          PT G-Codes  Outcome Measure Options: AM-PAC 6 Clicks Basic Mobility (PT)  AM-PAC 6 Clicks Score (PT): 24    PT Discharge Summary  Anticipated Discharge Disposition (PT): home    Joycelyn Spain  10/6/2022

## 2022-10-06 NOTE — DISCHARGE PLACEMENT REQUEST
"America Morley (62 y.o. Male)   Referring MD: Dr. Cristi Brandon  PCP:???  Covid negative on 9/22/22  Hospice Dx: Lung CA            Date of Birth   1960    Social Security Number       Address   [address] [place] [address]    Home Phone   [phone]    MRN   [id number]       Anabaptist   Restorationist    Marital Status                               Admission Date   10/5/22    Admission Type   Emergency    Admitting Provider   Cristi Brandon DO    Attending Provider   Cristi Brandon DO    Department, Room/Bed   [facility] 4G, S448/1       Discharge Date       Discharge Disposition       Discharge Destination                               Attending Provider: Cristi Brandon DO    Allergies: No Known Allergies    Isolation: None   Infection: None   Code Status: No CPR   Advance Care Planning Activity    Ht: 177.8 cm (70\")   Wt: 93 kg (205 lb)    Admission Cmt: None   Principal Problem: None                Active Insurance as of 10/5/2022     Primary Coverage     Payor Plan Insurance Group Employer/Plan Group    MEDICARE MEDICARE A & B      Payor Plan Address Payor Plan Phone Number Payor Plan Fax Number Effective Dates    [address] [phone]  10/1/2021 - None Entered    [facility] [id number]       Subscriber Name Subscriber Birth Date Member ID       AMERICA MORLEY 1960 [id number]           Secondary Coverage     Payor Plan Insurance Group Employer/Plan Group    ANTHEM BLUE CROSS ANTHEM BLUE CROSS BLUE SHIELD PPO [id number]     Payor Plan Address Payor Plan Phone Number Payor Plan Fax Number Effective Dates    [address] [phone]  1/1/2016 - None Entered    [facility] [id number]       Subscriber Name Subscriber Birth Date Member ID       AMERICA MORLEY 1960 [id number]                 Emergency Contacts      (Rel.) Home Phone Work Phone Mobile Phone    Chica Hager (Daughter) -- -- [phone]    "         Emergency Contact Information     Name Relation Home Work Mobile    Chica Hager   [phone]          Insurance Information                MEDICARE/MEDICARE A & B Phone: [phone]    Subscriber: Eliazar Morley Subscriber#: [id number]    Group#: -- Precert#: --        ANTHEM BLUE CROSS/ANTHEM BLUE CROSS BLUE SHIELD PPO Phone: [phone]    Subscriber: Eliazar Morley Subscriber#: [id number]    Group#: [id number] Precert#: --          Problem List           Codes Noted - Resolved       Hospital    Other acute pulmonary embolism without acute cor pulmonale (HCC) ICD-10-CM: I26.99  ICD-9-CM: 415.19 10/5/2022 - Present    Pain ICD-10-CM: R52  ICD-9-CM: 780.96 10/5/2022 - Present    Adenocarcinoma of left lung (HCC) (Chronic) ICD-10-CM: C34.92  ICD-9-CM: 162.9 2020 - Present       Non-Hospital    Confusion due to pain meds ICD-10-CM: R41.0, T50.905A  ICD-9-CM: 292.81 10/24/2020 - Present    Postoperative left chest pain ICD-10-CM: G89.18  ICD-9-CM: 338.18 10/24/2020 - Present    S/P L thoracotomy and JUDITH lobectomy 20 ICD-10-CM: Z98.890  ICD-9-CM: V45.89 2020 - Present    Hypertension ICD-10-CM: I10  ICD-9-CM: 401.9 Unknown - Present    CAD and stents x 8 in 2018 ICD-10-CM: I25.10  ICD-9-CM: 414.00 Unknown - Present    Type 2 diabetes mellitus with circulatory disorder, without long-term current use of insulin (HCC) ICD-10-CM: E11.59  ICD-9-CM: 250.70 Unknown - Present    Malignant neoplasm of lung (HCC) ICD-10-CM: C34.90  ICD-9-CM: 162.9 2020 - Present    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 male with history of metastatic adenocarcinoma of the lung, HTN, HLD, T2DM who presents to [facility] ED today with pain. Patient reports that he has had diffuse pain in his spine, R shoulder for some time. Patient attributes the shoulder pain to a previous biopsy he had almost a year ago with his oncologist. He feels this created nerve damage. He has had trouble with his PCP leaving and he no longer has f/u per patient report- he also no longer wishes to f/u with his oncologist as he does not want chemotherapy/treatment and did not care to go back to see his oncologist. He has a new appointment with a PCP on Friday of this week with Dr Miller, he also has placed a call to pain management clinic in [place] but reports he will need a referral from his new PCP. Patient essentially reports having significant difficulty with pain over the last few months- he was in the ED last week and received a 3-day supply of percocet but this only helped minimally and did not last long. He reports \"popping aleve like candy\" at home with still poorly controlled pain.     Oncologic history- previous history of left lobectomy and 8 rounds of chemotherapy. Previously followed with Dr Haro but has transitioned care to Dr Mccoy in [place] however has not seen any oncologist in months. Per ED documentation He then stopped treatment and going to appointments in July because \"I just didn't trust that hospital\" and \"I couldn't stand the IVs\".     Patient was ultimately found in the ED on CT imaging w/u to have \"Acute pulmonary emboli at the right main pulmonary artery extending into right upper lobe, right lower lobe and right middle lobe segmental pulmonary arteries. No findings of right heart strain.\"    Patient also noted to have metastatic " findings on imaging and pulmonary nodules as well as hilar metastatic adenopathy.       Review of Systems   Gen- No fevers, chills  CV- No chest pain, palpitations  Resp- No cough, dyspnea  GI- No N/V/D, abd pain    +diffuse pain  All other systems reviewed and are negative.     Personal History     Past Medical History:   Diagnosis Date   • Adenocarcinoma of lung (HCC) 07/2020   • Anticoagulated    • Appendicitis    • Coronary arteriosclerosis in native artery    • Coronary artery disease    • Elevated blood pressure reading    • Esophageal reflux    • Fracture    • Fractures    • GERD (gastroesophageal reflux disease)    • Hearing disorder    • Heart murmur    • Hypercholesterolemia    • Hyperlipidemia    • Hypertension    • Lung mass     lung biopsy today 7/31/2020   • Mixed hyperlipidemia    • Prediabetes    • Tendon sheath disorder     INJECTION TENDON SHEATH, LIGAMENT         Oncology Problem List:  Malignant neoplasm of lung (HCC) (09/23/2020; Status: Active)  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     Adenocarcinoma of left lung (HCC)   8/27/2020 Initial Diagnosis    Adenocarcinoma of left lung (CMS/HCC)     8/27/2020 Cancer Staged    Staging form: Lung, AJCC 8th Edition  - Clinical: Stage IIB (cT3, cN0, cM0) - Signed by Daniel Haro MD on 8/27/2020 9/23/2020 Surgery    Surgery       Left upper lobectomy pathology showed grade 2 mucinous adenocarcinoma, 6.7 cm, 0 out of 3 peribronchial nodes, 0 out of 1 level 9, 0 out of 1 level 6, 0 out of 1 level 7, 0 out of 1 level 8 nodes.     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 (CMS/[facility])   9/14/2020 Initial Diagnosis    Malignant neoplasm of upper lobe of left lung (CMS/[facility])     9/23/2020 Surgery    Surgery       Left upper lobectomy showed grade 2 mucinous adenocarcinoma   6.7 cm, 0 out of 3 peribronchial nodes, 0 out of 1 level 9 nodes, 0 out of 1 level 6 nodes, 0 out of 1 level 7 nodes, 0 out of 1 level 8 nodes    Pathological stage T3 N0 M0 stage IIb     9/25/2020 Cancer Staged    Staging form: Lung, AJCC 8th Edition  - Pathologic: Stage IIB (pT3, pN0, cM0) - Signed by Daniel Haro MD on 9/25/2020     10/23/2020 -  Chemotherapy    OP LUNG PEMEtrexed / CISplatin         Past Surgical History:   Procedure Laterality Date   • AMPUTATION FINGER / THUMB Right    • APPENDECTOMY     • BRONCHOSCOPY THORACOTOMY Left 09/23/2020    Procedure: BRONCHOSCOPY LEFT THORACOTOMY LEFT UPPER LOBECTOMY, MEDIASTINAL LYMPH NODE DISSECTION;  Surgeon: Luis Booth MD;  Location:  [facility] OR;  Service: Cardiothoracic;  Laterality: Left;   • CARDIAC CATHETERIZATION     • CARDIAC CATHETERIZATION N/A 08/17/2018    Procedure: Left Heart Cath;  Surgeon: Janes Rosen MD;  Location:  [facility] CATH INVASIVE LOCATION;  Service: Cardiovascular   • CAROTID STENT  2009    3 STENTS   • COLONOSCOPY     • CORONARY STENT PLACEMENT      x8   • TIBIA FRACTURE SURGERY Bilateral    • TRIGGER FINGER RELEASE Bilateral        Family History:  family history includes Alzheimer's disease in his father; Asthma in his sister; Cancer in his mother and another family member; Cirrhosis in his sister; Depression in his brother and sister; Emphysema in his mother; Hearing loss in his father; Heart attack in his paternal grandmother; Hypertension in his sister; Osteoarthritis in his brother, father, and mother; Other (age of onset: 26) in his brother; Rheum  arthritis in his brother; Stroke in his father and mother. Otherwise pertinent FHx was reviewed and unremarkable.     Social History:  reports that he quit smoking about 2 years ago. His smoking use included cigarettes. He has a 45.00 pack-year smoking history. He has never used smokeless tobacco. He reports current alcohol use of about 6.0 standard drinks of alcohol per week. He reports that he does not use drugs.  Social History     Social History Narrative    L[facility] in [facility]        Medications:  Available home medication information reviewed.  (Not in a hospital admission)      No Known Allergies    Objective   Objective     Vital Signs:   Temp:  [97.6 °F (36.4 °C)] 97.6 °F (36.4 °C)  Heart Rate:  [56-63] 60  Resp:  [20-22] 20  BP: (120-146)/(49-72) 126/64       Physical Exam   GEN: NAD, resting in bed, awake  HEENT: on NC, atraumatic, normocephalic  NECK: supple, no masses  RESP: on NC, effort normal  CV: on tele, sinus rhythm  PSYCH: normal affect, appropriate  NEURO: awake, alert, no focal deficits noted  MSK: no edema noted  SKIN: no rashes noted       Result Review:  I have personally reviewed the results from the time of this admission to 10/5/2022 12:29 EDT and agree with these findings:  [x]  Laboratory list / accordion  [x]  Microbiology  [x]  Radiology  []  EKG/Telemetry   []  Cardiology/Vascular   [x]  Pathology  [x]  Old records  []  Other:  Most notable findings include:     See hpi        LAB RESULTS:      Lab 10/05/22  1021 10/05/22  0710   WBC  --  9.85   HEMOGLOBIN  --  15.0   HEMATOCRIT  --  45.4   PLATELETS  --  146   NEUTROS ABS  --  6.69   IMMATURE GRANS (ABS)  --  0.05   LYMPHS ABS  --  2.03   MONOS ABS  --  0.84   EOS ABS  --  0.19   MCV  --  91.2   PROTIME  --  13.3   INR  --  1.02   APTT  --  36.7*   HEPARIN ANTI-XA 0.10*  --          Lab 10/05/22  0710   SODIUM 142   POTASSIUM 4.3   CHLORIDE 103   CO2 24.0   ANION GAP 15.0   BUN 19   CREATININE 0.93   EGFR 92.3   GLUCOSE  103*   CALCIUM 10.1         Lab 10/05/22  0710   TOTAL PROTEIN 7.7   ALBUMIN 4.40   GLOBULIN 3.3   ALT (SGPT) 18   AST (SGOT) 22   BILIRUBIN 0.5   ALK PHOS 176*   LIPASE 40         Lab 10/05/22  0710   PROBNP 147.4   TROPONIN T <0.010                 UA    Urinalysis 10/5/22   Specific [place], UA 1.054 (A)   Ketones, UA Negative   Blood, UA Negative   Leukocytes, UA Negative   Nitrite, UA Negative   (A) Abnormal value              Microbiology Results (last 10 days)     ** No results found for the last 240 hours. **          CT Abdomen Pelvis With Contrast    Result Date: 10/5/2022  DATE OF EXAM: 10/5/2022 8:32 AM  PROCEDURE: CT ANGIOGRAM CHEST-, CT ABDOMEN PELVIS W CONTRAST-  INDICATIONS: lung cancer with mets, coughing up blood  COMPARISON: CT chest abdomen and pelvis 10/22/2020  TECHNIQUE: Contiguous axial imaging was obtained of the chest, abdomen, and pelvis following the intravenous administration of 150 mL of Isovue 370. Reconstructed coronal and sagittal images were also obtained. Automated exposure control and iterative reconstruction methods were used.  The radiation dose reduction device was turned on for each scan per the ALARA (As Low as Reasonably Achievable) protocol.  FINDINGS: Chest: Soft tissues of the lower neck without acute abnormality. Right supraclavicular node measures 10 mm on image 12. Heart size normal. Extensive, three-vessel coronary artery calcifications. There are pulmonary emboli at the right main pulmonary artery bifurcation extending into right upper lobe anterior segment pulmonary artery, right middle lobar pulmonary artery, and the superior segment right lower lobe pulmonary artery. Negative for pulmonary embolus on left. Intraventricular septum is normal in position without findings to indicate right heart strain. Right hilar nodes enlarged measuring 1.3 cm on image 45. Additional right hilar tissue anterior to right mainstem bronchus measures 2.2 cm on image 42.  Postsurgical  changes of left upper lobectomy. No pneumothorax. Moderate emphysema. Multiple pulmonary masses and nodules most consistent with pulmonary metastatic disease for example involving right upper lobe abutting the pleura measuring 4.5 x 3.2 cm on image 35. Involving the right lower lobe measuring 4.4 x 3.9 cm on image 63. Additional nodule in the right middle lobe measures 8 mm on image 58. Right lower lobe nodules measure 2 cm on image 66 and 1 cm on image 67. Multiple pulmonary nodules also noted within the left lower lobe for example anteriorly abutting pleura measuring 2.1 cm on image 36. Additional left lower lobe nodule measures 9 mm on image 44. Multiple other smaller nodules noted bilaterally.  There are multifocal mixed lytic and sclerotic osseous lesions consistent with osseous metastasis, new from comparison exam for example involving the C7 vertebral body. Mixed lytic and sclerotic lesion involving the T1 vertebral body and the right T1 pedicle and transverse process. Additional sclerotic lesion at the posterior aspect of T2 and T3. Additional small area of sclerosis involving the superior endplate of T12. Small sclerotic lesion at T8 level measuring 6 mm. Chronic deformities of left posterior sixth and seventh ribs likely related to prior thoracotomy.  Abdomen and pelvis: The liver is normal in size and contour. Spleen borderline enlarged. Normal adrenal glands. Pancreas without findings of pancreatitis. The gallbladder is present. No pericholecystic inflammation. Kidneys demonstrate symmetric enhancement. No obstructive uropathy. Urinary bladder is thin walled. Prostate borderline enlarged.  Negative for pneumoperitoneum. Sigmoid diverticulosis. No bowel obstruction. No findings of appendicitis. Stomach and proximal small bowel normally configured. The portal vein, splenic vein, and superior mesenteric vein are patent. Moderate atherosclerotic calcifications of the abdominal aorta and branch vasculature.  No fluid collection the abdomen or pelvis.  There are multiple osseous lesions consistent with metastatic disease for example sclerotic lesion involving the superior plate of L1 measuring 1.9 cm. Lytic lesion involving L2 vertebral body measuring 2.5 cm with mild height loss at the superior endplate. No retropulsed fracture fragment. Lytic lesion involving left anterior acetabulum measuring 2.5 cm. Small sclerotic lesions noted in the medial left iliac bone.      Impression: Chest: 1. Acute pulmonary emboli at the right main pulmonary artery extending into right upper lobe, right lower lobe and right middle lobe segmental pulmonary arteries. No findings of right heart strain. 2. Multiple bilateral pulmonary nodules and masses largest in right upper lobe measuring 4.5 cm and right lower lobe measuring 4.4 cm consistent with pulmonary metastatic disease. 3. Mixed lytic and sclerotic osseous metastases most pronounced at  T1. 4. Enlarged right hilar lymph nodes likely hilar metastatic adenopathy. 5. Emphysema with postsurgical changes of prior left upper lobectomy. 6. Coronary artery calcifications and additional chronic findings above.  Abdomen and pelvis: 1. No acute abnormality in the abdomen or pelvis. 2. Mixed lytic and sclerotic osseous lesions consistent with osseous metastases as above most pronounced at L2 and left anterior acetabulum. 3. Sigmoid diverticulosis without diverticulitis. 4. Additional chronic findings above.  I discussed findings with Dr. Suggs at 9:29 a.m. on 10/5/2022.  This report was finalized on 10/5/2022 9:31 AM by Srinath Rogers MD.      XR Chest 1 View    Result Date: 10/5/2022   DATE OF EXAM: 10/5/2022 8:04 AM  PROCEDURE: XR CHEST 1 VW-  INDICATIONS: pain  COMPARISON: 10/01/2020  TECHNIQUE: Portable chest radiograph.  FINDINGS: Patient is rotated slightly to the left. Stable postoperative changes of the left hemithorax. Coronary stent noted. No pneumothorax. Chronic blunting at the left  lateral costophrenic angle. There is nodular mass/consolidation overlying the periphery of the right lung which may relate to pneumonia could relate to pulmonary mass. Osseous structures are grossly intact.      Impression: Airspace disease overlying the periphery of the right midlung may relate to pulmonary mass or pneumonia. Recommend correlation with clinical findings and close follow-up to ensure resolution. Otherwise consider CT for further assessment.  Stable postsurgical changes of the left hemithorax.  This report was finalized on 10/5/2022 8:20 AM by Srinath Rogers MD.      CT Angiogram Chest    Result Date: 10/5/2022  DATE OF EXAM: 10/5/2022 8:32 AM  PROCEDURE: CT ANGIOGRAM CHEST-, CT ABDOMEN PELVIS W CONTRAST-  INDICATIONS: lung cancer with mets, coughing up blood  COMPARISON: CT chest abdomen and pelvis 10/22/2020  TECHNIQUE: Contiguous axial imaging was obtained of the chest, abdomen, and pelvis following the intravenous administration of 150 mL of Isovue 370. Reconstructed coronal and sagittal images were also obtained. Automated exposure control and iterative reconstruction methods were used.  The radiation dose reduction device was turned on for each scan per the ALARA (As Low as Reasonably Achievable) protocol.  FINDINGS: Chest: Soft tissues of the lower neck without acute abnormality. Right supraclavicular node measures 10 mm on image 12. Heart size normal. Extensive, three-vessel coronary artery calcifications. There are pulmonary emboli at the right main pulmonary artery bifurcation extending into right upper lobe anterior segment pulmonary artery, right middle lobar pulmonary artery, and the superior segment right lower lobe pulmonary artery. Negative for pulmonary embolus on left. Intraventricular septum is normal in position without findings to indicate right heart strain. Right hilar nodes enlarged measuring 1.3 cm on image 45. Additional right hilar tissue anterior to right mainstem bronchus  measures 2.2 cm on image 42.  Postsurgical changes of left upper lobectomy. No pneumothorax. Moderate emphysema. Multiple pulmonary masses and nodules most consistent with pulmonary metastatic disease for example involving right upper lobe abutting the pleura measuring 4.5 x 3.2 cm on image 35. Involving the right lower lobe measuring 4.4 x 3.9 cm on image 63. Additional nodule in the right middle lobe measures 8 mm on image 58. Right lower lobe nodules measure 2 cm on image 66 and 1 cm on image 67. Multiple pulmonary nodules also noted within the left lower lobe for example anteriorly abutting pleura measuring 2.1 cm on image 36. Additional left lower lobe nodule measures 9 mm on image 44. Multiple other smaller nodules noted bilaterally.  There are multifocal mixed lytic and sclerotic osseous lesions consistent with osseous metastasis, new from comparison exam for example involving the C7 vertebral body. Mixed lytic and sclerotic lesion involving the T1 vertebral body and the right T1 pedicle and transverse process. Additional sclerotic lesion at the posterior aspect of T2 and T3. Additional small area of sclerosis involving the superior endplate of T12. Small sclerotic lesion at T8 level measuring 6 mm. Chronic deformities of left posterior sixth and seventh ribs likely related to prior thoracotomy.  Abdomen and pelvis: The liver is normal in size and contour. Spleen borderline enlarged. Normal adrenal glands. Pancreas without findings of pancreatitis. The gallbladder is present. No pericholecystic inflammation. Kidneys demonstrate symmetric enhancement. No obstructive uropathy. Urinary bladder is thin walled. Prostate borderline enlarged.  Negative for pneumoperitoneum. Sigmoid diverticulosis. No bowel obstruction. No findings of appendicitis. Stomach and proximal small bowel normally configured. The portal vein, splenic vein, and superior mesenteric vein are patent. Moderate atherosclerotic calcifications of  the abdominal aorta and branch vasculature. No fluid collection the abdomen or pelvis.  There are multiple osseous lesions consistent with metastatic disease for example sclerotic lesion involving the superior plate of L1 measuring 1.9 cm. Lytic lesion involving L2 vertebral body measuring 2.5 cm with mild height loss at the superior endplate. No retropulsed fracture fragment. Lytic lesion involving left anterior acetabulum measuring 2.5 cm. Small sclerotic lesions noted in the medial left iliac bone.      Impression: Chest: 1. Acute pulmonary emboli at the right main pulmonary artery extending into right upper lobe, right lower lobe and right middle lobe segmental pulmonary arteries. No findings of right heart strain. 2. Multiple bilateral pulmonary nodules and masses largest in right upper lobe measuring 4.5 cm and right lower lobe measuring 4.4 cm consistent with pulmonary metastatic disease. 3. Mixed lytic and sclerotic osseous metastases most pronounced at  T1. 4. Enlarged right hilar lymph nodes likely hilar metastatic adenopathy. 5. Emphysema with postsurgical changes of prior left upper lobectomy. 6. Coronary artery calcifications and additional chronic findings above.  Abdomen and pelvis: 1. No acute abnormality in the abdomen or pelvis. 2. Mixed lytic and sclerotic osseous lesions consistent with osseous metastases as above most pronounced at L2 and left anterior acetabulum. 3. Sigmoid diverticulosis without diverticulitis. 4. Additional chronic findings above.  I discussed findings with Dr. Suggs at 9:29 a.m. on 10/5/2022.  This report was finalized on 10/5/2022 9:31 AM by Srinath Rogers MD.            Assessment & Plan   Assessment & Plan     Active Hospital Problems    Diagnosis  POA   • Other acute pulmonary embolism without acute cor pulmonale (HCC) [I26.99]  Yes       Eliazar Morley is a 62 y.o. male with history of metastatic adenocarcinoma of the lung, HTN, HLD, T2DM who presents to the [facility] ED  "today with pain. Patient was ultimately found in the ED on CT imaging w/u to have \"Acute pulmonary emboli at the right main pulmonary artery extending into right upper lobe, right lower lobe and right middle lobe segmental pulmonary arteries. No findings of right heart strain.\"      Acute PE in setting of metastatic cancer  Hypoxia  --started on heparin in ED- continue but would favor transition to NOAC soon  --ECHO, no RH strain on CT imaging  --suspect related to malignancy as above, likely will need lifelong AC  --place on 1L in ED, wean oxygen as tolerated      Adenocarcinoma of lung, metastatic  Bony mets, lung nodules, metastatic adenopathy  Chronic pain related to above  --patient reported poor pain control and would benefit significantly from f/u and outpatient management of his pain. Will place on IV dilaudid here for now given acute nature of his pain, but have also d/w him palliative consult and ultimately maybe hospice referral given his terminal illness. He is agreeable to this   --he does not want oncology consultation as he is set that he does not wish for any further treatment at this point  --PCP f/u set for Friday, hopefully patient can achieve pain control and be discharged in time to make this appointment     HTN  --continue home meds     HLD  --on home repatha         DVT prophylaxis: heparin      CODE STATUS:    Code Status and Medical Interventions:   Ordered at: 10/05/22 1228     Medical Intervention Limits:    NO intubation (DNI)    NO vasopressors    NO dialysis     Code Status (Patient has no pulse and is not breathing):    No CPR (Do Not Attempt to Resuscitate)     Medical Interventions (Patient has pulse or is breathing):    Limited Support     Comments:    d/w patient         Chica Stacy MD  10/05/22      Electronically signed by Chica Stacy MD at 10/05/22 1231         Current Facility-Administered Medications   Medication Dose Route Frequency Provider Last Rate Last Admin   • " acetaminophen (TYLENOL) tablet 650 mg  650 mg Oral Q4H PRN Chica Stacy MD       • aspirin EC tablet 81 mg  81 mg Oral Daily Chica Stacy MD   81 mg at 10/06/22 0824   • carvedilol (COREG) tablet 12.5 mg  12.5 mg Oral BID With Meals Chica Stacy MD   12.5 mg at 10/06/22 0824   • heparin 35265 units/250 mL (100 units/mL) in 0.45 % NaCl infusion  18 Units/kg/hr Intravenous Titrated Janes Mascorro IV, PharmD 16.74 mL/hr at 10/06/22 0800 18 Units/kg/hr at 10/06/22 0800   • HYDROmorphone (DILAUDID) injection 2 mg  2 mg Intravenous Q4H PRN Chica Stacy MD   2 mg at 10/06/22 0632   • losartan (COZAAR) tablet 50 mg  50 mg Oral Q24H Chica Stacy MD   50 mg at 10/06/22 0824   • ondansetron (ZOFRAN) tablet 4 mg  4 mg Oral Q6H PRN Chica Stacy MD       • oxyCODONE-acetaminophen (PERCOCET)  MG per tablet 1 tablet  1 tablet Oral Q4H PRN Chica Stacy MD   1 tablet at 10/06/22 0824   • oxyCODONE-acetaminophen (PERCOCET) 5-325 MG per tablet 1 tablet  1 tablet Oral Once Chica Stacy MD       • pantoprazole (PROTONIX) EC tablet 40 mg  40 mg Oral BID AC Chica Stacy MD   40 mg at 10/06/22 0632   • Pharmacy to Dose Heparin   Does not apply Continuous PRN Chica Stacy MD       • sodium chloride 0.9 % flush 10 mL  10 mL Intravenous PRN Chica Stacy MD       • sodium chloride 0.9 % flush 10 mL  10 mL Intravenous PRN Chica Stacy MD       • sodium chloride 0.9 % flush 10 mL  10 mL Intravenous Q12H Chica Stacy MD   10 mL at 10/06/22 0824   • sodium chloride 0.9 % flush 10 mL  10 mL Intravenous PRN Chica Stacy MD         Imaging Results (All)     Procedure Component Value Units Date/Time    CT Angiogram Chest [[id number]] Collected: 10/05/22 0905     Updated: 10/05/22 0934    Narrative:      DATE OF EXAM: 10/5/2022 8:32 AM     PROCEDURE: CT ANGIOGRAM CHEST-, CT ABDOMEN PELVIS W CONTRAST-     INDICATIONS: lung cancer with mets, coughing up blood     COMPARISON: CT chest abdomen  and pelvis 10/22/2020     TECHNIQUE: Contiguous axial imaging was obtained of the chest, abdomen,  and pelvis following the intravenous administration of 150 mL of Isovue  370. Reconstructed coronal and sagittal images were also obtained.  Automated exposure control and iterative reconstruction methods were  used.     The radiation dose reduction device was turned on for each scan per the  ALARA (As Low as Reasonably Achievable) protocol.     FINDINGS:  Chest:  Soft tissues of the lower neck without acute abnormality. Right  supraclavicular node measures 10 mm on image 12. Heart size normal.  Extensive, three-vessel coronary artery calcifications. There are  pulmonary emboli at the right main pulmonary artery bifurcation  extending into right upper lobe anterior segment pulmonary artery, right  middle lobar pulmonary artery, and the superior segment right lower lobe  pulmonary artery. Negative for pulmonary embolus on left.  Intraventricular septum is normal in position without findings to  indicate right heart strain. Right hilar nodes enlarged measuring 1.3 cm  on image 45. Additional right hilar tissue anterior to right mainstem  bronchus measures 2.2 cm on image 42.     Postsurgical changes of left upper lobectomy. No pneumothorax. Moderate  emphysema. Multiple pulmonary masses and nodules most consistent with  pulmonary metastatic disease for example involving right upper lobe  abutting the pleura measuring 4.5 x 3.2 cm on image 35. Involving the  right lower lobe measuring 4.4 x 3.9 cm on image 63. Additional nodule  in the right middle lobe measures 8 mm on image 58. Right lower lobe  nodules measure 2 cm on image 66 and 1 cm on image 67. Multiple  pulmonary nodules also noted within the left lower lobe for example  anteriorly abutting pleura measuring 2.1 cm on image 36. Additional left  lower lobe nodule measures 9 mm on image 44. Multiple other smaller  nodules noted bilaterally.     There are multifocal  mixed lytic and sclerotic osseous lesions  consistent with osseous metastasis, new from comparison exam for example  involving the C7 vertebral body. Mixed lytic and sclerotic lesion  involving the T1 vertebral body and the right T1 pedicle and transverse  process. Additional sclerotic lesion at the posterior aspect of T2 and  T3. Additional small area of sclerosis involving the superior endplate  of T12. Small sclerotic lesion at T8 level measuring 6 mm. Chronic  deformities of left posterior sixth and seventh ribs likely related to  prior thoracotomy.     Abdomen and pelvis:  The liver is normal in size and contour. Spleen borderline enlarged.  Normal adrenal glands. Pancreas without findings of pancreatitis. The  gallbladder is present. No pericholecystic inflammation. Kidneys  demonstrate symmetric enhancement. No obstructive uropathy. Urinary  bladder is thin walled. Prostate borderline enlarged.     Negative for pneumoperitoneum. Sigmoid diverticulosis. No bowel  obstruction. No findings of appendicitis. Stomach and proximal small  bowel normally configured. The portal vein, splenic vein, and superior  mesenteric vein are patent. Moderate atherosclerotic calcifications of  the abdominal aorta and branch vasculature. No fluid collection the  abdomen or pelvis.     There are multiple osseous lesions consistent with metastatic disease  for example sclerotic lesion involving the superior plate of L1  measuring 1.9 cm. Lytic lesion involving L2 vertebral body measuring 2.5  cm with mild height loss at the superior endplate. No retropulsed  fracture fragment. Lytic lesion involving left anterior acetabulum  measuring 2.5 cm. Small sclerotic lesions noted in the medial left iliac  bone.       Impression:      Chest:  1. Acute pulmonary emboli at the right main pulmonary artery extending  into right upper lobe, right lower lobe and right middle lobe segmental  pulmonary arteries. No findings of right heart strain.  2.  Multiple bilateral pulmonary nodules and masses largest in right  upper lobe measuring 4.5 cm and right lower lobe measuring 4.4 cm  consistent with pulmonary metastatic disease.  3. Mixed lytic and sclerotic osseous metastases most pronounced at  T1.  4. Enlarged right hilar lymph nodes likely hilar metastatic adenopathy.  5. Emphysema with postsurgical changes of prior left upper lobectomy.  6. Coronary artery calcifications and additional chronic findings above.     Abdomen and pelvis:  1. No acute abnormality in the abdomen or pelvis.  2. Mixed lytic and sclerotic osseous lesions consistent with osseous  metastases as above most pronounced at L2 and left anterior acetabulum.  3. Sigmoid diverticulosis without diverticulitis.  4. Additional chronic findings above.     I discussed findings with Dr. Suggs at 9:29 a.m. on 10/5/2022.      This report was finalized on 10/5/2022 9:31 AM by Srinath Rogers MD.       CT Abdomen Pelvis With Contrast [[id number]] Collected: 10/05/22 0905     Updated: 10/05/22 0934    Narrative:      DATE OF EXAM: 10/5/2022 8:32 AM     PROCEDURE: CT ANGIOGRAM CHEST-, CT ABDOMEN PELVIS W CONTRAST-     INDICATIONS: lung cancer with mets, coughing up blood     COMPARISON: CT chest abdomen and pelvis 10/22/2020     TECHNIQUE: Contiguous axial imaging was obtained of the chest, abdomen,  and pelvis following the intravenous administration of 150 mL of Isovue  370. Reconstructed coronal and sagittal images were also obtained.  Automated exposure control and iterative reconstruction methods were  used.     The radiation dose reduction device was turned on for each scan per the  ALARA (As Low as Reasonably Achievable) protocol.     FINDINGS:  Chest:  Soft tissues of the lower neck without acute abnormality. Right  supraclavicular node measures 10 mm on image 12. Heart size normal.  Extensive, three-vessel coronary artery calcifications. There are  pulmonary emboli at the right main pulmonary artery  bifurcation  extending into right upper lobe anterior segment pulmonary artery, right  middle lobar pulmonary artery, and the superior segment right lower lobe  pulmonary artery. Negative for pulmonary embolus on left.  Intraventricular septum is normal in position without findings to  indicate right heart strain. Right hilar nodes enlarged measuring 1.3 cm  on image 45. Additional right hilar tissue anterior to right mainstem  bronchus measures 2.2 cm on image 42.     Postsurgical changes of left upper lobectomy. No pneumothorax. Moderate  emphysema. Multiple pulmonary masses and nodules most consistent with  pulmonary metastatic disease for example involving right upper lobe  abutting the pleura measuring 4.5 x 3.2 cm on image 35. Involving the  right lower lobe measuring 4.4 x 3.9 cm on image 63. Additional nodule  in the right middle lobe measures 8 mm on image 58. Right lower lobe  nodules measure 2 cm on image 66 and 1 cm on image 67. Multiple  pulmonary nodules also noted within the left lower lobe for example  anteriorly abutting pleura measuring 2.1 cm on image 36. Additional left  lower lobe nodule measures 9 mm on image 44. Multiple other smaller  nodules noted bilaterally.     There are multifocal mixed lytic and sclerotic osseous lesions  consistent with osseous metastasis, new from comparison exam for example  involving the C7 vertebral body. Mixed lytic and sclerotic lesion  involving the T1 vertebral body and the right T1 pedicle and transverse  process. Additional sclerotic lesion at the posterior aspect of T2 and  T3. Additional small area of sclerosis involving the superior endplate  of T12. Small sclerotic lesion at T8 level measuring 6 mm. Chronic  deformities of left posterior sixth and seventh ribs likely related to  prior thoracotomy.     Abdomen and pelvis:  The liver is normal in size and contour. Spleen borderline enlarged.  Normal adrenal glands. Pancreas without findings of pancreatitis.  The  gallbladder is present. No pericholecystic inflammation. Kidneys  demonstrate symmetric enhancement. No obstructive uropathy. Urinary  bladder is thin walled. Prostate borderline enlarged.     Negative for pneumoperitoneum. Sigmoid diverticulosis. No bowel  obstruction. No findings of appendicitis. Stomach and proximal small  bowel normally configured. The portal vein, splenic vein, and superior  mesenteric vein are patent. Moderate atherosclerotic calcifications of  the abdominal aorta and branch vasculature. No fluid collection the  abdomen or pelvis.     There are multiple osseous lesions consistent with metastatic disease  for example sclerotic lesion involving the superior plate of L1  measuring 1.9 cm. Lytic lesion involving L2 vertebral body measuring 2.5  cm with mild height loss at the superior endplate. No retropulsed  fracture fragment. Lytic lesion involving left anterior acetabulum  measuring 2.5 cm. Small sclerotic lesions noted in the medial left iliac  bone.       Impression:      Chest:  1. Acute pulmonary emboli at the right main pulmonary artery extending  into right upper lobe, right lower lobe and right middle lobe segmental  pulmonary arteries. No findings of right heart strain.  2. Multiple bilateral pulmonary nodules and masses largest in right  upper lobe measuring 4.5 cm and right lower lobe measuring 4.4 cm  consistent with pulmonary metastatic disease.  3. Mixed lytic and sclerotic osseous metastases most pronounced at  T1.  4. Enlarged right hilar lymph nodes likely hilar metastatic adenopathy.  5. Emphysema with postsurgical changes of prior left upper lobectomy.  6. Coronary artery calcifications and additional chronic findings above.     Abdomen and pelvis:  1. No acute abnormality in the abdomen or pelvis.  2. Mixed lytic and sclerotic osseous lesions consistent with osseous  metastases as above most pronounced at L2 and left anterior acetabulum.  3. Sigmoid diverticulosis  without diverticulitis.  4. Additional chronic findings above.     I discussed findings with Dr. Suggs at 9:29 a.m. on 10/5/2022.      This report was finalized on 10/5/2022 9:31 AM by Srinath Rogers MD.       XR Chest 1 View [[id number]] Collected: 10/05/22 0818     Updated: 10/05/22 0823    Narrative:         DATE OF EXAM:   10/5/2022 8:04 AM     PROCEDURE:   XR CHEST 1 VW-     INDICATIONS:   pain     COMPARISON:  10/01/2020     TECHNIQUE:   Portable chest radiograph.     FINDINGS:   Patient is rotated slightly to the left. Stable postoperative changes of  the left hemithorax. Coronary stent noted. No pneumothorax. Chronic  blunting at the left lateral costophrenic angle. There is nodular  mass/consolidation overlying the periphery of the right lung which may  relate to pneumonia could relate to pulmonary mass. Osseous structures  are grossly intact.       Impression:      Airspace disease overlying the periphery of the right midlung may relate  to pulmonary mass or pneumonia. Recommend correlation with clinical  findings and close follow-up to ensure resolution. Otherwise consider CT  for further assessment.     Stable postsurgical changes of the left hemithorax.     This report was finalized on 10/5/2022 8:20 AM by Srinath Rogers MD.             Physician Progress Notes (last 72 hours)  Notes from 10/03/22 0833 through 10/06/22 0833   No notes of this type exist for this encounter.         Consult Notes (last 72 hours)  Notes from 10/03/22 0833 through 10/06/22 0833   No notes of this type exist for this encounter.

## 2022-10-06 NOTE — PROGRESS NOTES
[facility] Medicine Services  PROGRESS NOTE    Patient Name: Eliazar Morley  : 1960  MRN: [id number]    Date of Admission: 10/5/2022  Primary Care Physician: Provider, No Known    Subjective   Subjective     CC:  Follow-up pulmonary embolus    HPI:  Seen by palliative care, they are managing his pain medicines.  Modestly effective currently, he is hopeful to have good regimen prior to discharge.    ROS:  Gen- No fevers, chills  CV- No chest pain, palpitations  Resp- No cough, dyspnea  GI- No N/V/D, abd pain     Objective   Objective     Vital Signs:   Temp:  [97.8 °F (36.6 °C)-98 °F (36.7 °C)] 97.8 °F (36.6 °C)  Heart Rate:  [44-63] 55  Resp:  [18-22] 18  BP: (106-146)/(49-74) 117/74  Flow (L/min):  [2] 2     Physical Exam:  Constitutional: No acute distress, awake, alert, no acute distress lying in bed  HENT: NCAT, mucous membranes moist  Respiratory: Clear to auscultation bilaterally, respiratory effort normal   Cardiovascular: RRR, palpable radial pulses  Gastrointestinal: Positive bowel sounds, soft, nontender, nondistended  Musculoskeletal: No bilateral ankle edema  Psychiatric: Appropriate affect, cooperative  Neurologic: Speech clear and fluent, moving all extremities spontaneously    Results Reviewed:  LAB RESULTS:      Lab 10/05/22  2251 10/05/22  1627 10/05/22  1021 10/05/22  0710   WBC  --   --   --  9.85   HEMOGLOBIN  --   --   --  15.0   HEMATOCRIT  --   --   --  45.4   PLATELETS  --   --   --  146   NEUTROS ABS  --   --   --  6.69   IMMATURE GRANS (ABS)  --   --   --  0.05   LYMPHS ABS  --   --   --  2.03   MONOS ABS  --   --   --  0.84   EOS ABS  --   --   --  0.19   MCV  --   --   --  91.2   PROTIME  --   --   --  13.3   APTT  --   --   --  36.7*   HEPARIN ANTI-XA 0.26* 0.32 0.10*  --          Lab 10/05/22  0710   SODIUM 142   POTASSIUM 4.3   CHLORIDE 103   CO2 24.0   ANION GAP 15.0   BUN 19   CREATININE 0.93   EGFR 92.3   GLUCOSE 103*   CALCIUM 10.1         Lab  10/05/22  0710   TOTAL PROTEIN 7.7   ALBUMIN 4.40   GLOBULIN 3.3   ALT (SGPT) 18   AST (SGOT) 22   BILIRUBIN 0.5   ALK PHOS 176*   LIPASE 40         Lab 10/05/22  0710   PROBNP 147.4   TROPONIN T <0.010   PROTIME 13.3   INR 1.02                 Brief Urine Lab Results  (Last result in the past 365 days)      Color   Clarity   Blood   Leuk Est   Nitrite   Protein   CREAT   Urine HCG        10/05/22 1001 Yellow   Clear   Negative   Negative   Negative   Negative                 Microbiology Results Abnormal     None          CT Abdomen Pelvis With Contrast    Result Date: 10/5/2022  DATE OF EXAM: 10/5/2022 8:32 AM  PROCEDURE: CT ANGIOGRAM CHEST-, CT ABDOMEN PELVIS W CONTRAST-  INDICATIONS: lung cancer with mets, coughing up blood  COMPARISON: CT chest abdomen and pelvis 10/22/2020  TECHNIQUE: Contiguous axial imaging was obtained of the chest, abdomen, and pelvis following the intravenous administration of 150 mL of Isovue 370. Reconstructed coronal and sagittal images were also obtained. Automated exposure control and iterative reconstruction methods were used.  The radiation dose reduction device was turned on for each scan per the ALARA (As Low as Reasonably Achievable) protocol.  FINDINGS: Chest: Soft tissues of the lower neck without acute abnormality. Right supraclavicular node measures 10 mm on image 12. Heart size normal. Extensive, three-vessel coronary artery calcifications. There are pulmonary emboli at the right main pulmonary artery bifurcation extending into right upper lobe anterior segment pulmonary artery, right middle lobar pulmonary artery, and the superior segment right lower lobe pulmonary artery. Negative for pulmonary embolus on left. Intraventricular septum is normal in position without findings to indicate right heart strain. Right hilar nodes enlarged measuring 1.3 cm on image 45. Additional right hilar tissue anterior to right mainstem bronchus measures 2.2 cm on image 42.  Postsurgical  changes of left upper lobectomy. No pneumothorax. Moderate emphysema. Multiple pulmonary masses and nodules most consistent with pulmonary metastatic disease for example involving right upper lobe abutting the pleura measuring 4.5 x 3.2 cm on image 35. Involving the right lower lobe measuring 4.4 x 3.9 cm on image 63. Additional nodule in the right middle lobe measures 8 mm on image 58. Right lower lobe nodules measure 2 cm on image 66 and 1 cm on image 67. Multiple pulmonary nodules also noted within the left lower lobe for example anteriorly abutting pleura measuring 2.1 cm on image 36. Additional left lower lobe nodule measures 9 mm on image 44. Multiple other smaller nodules noted bilaterally.  There are multifocal mixed lytic and sclerotic osseous lesions consistent with osseous metastasis, new from comparison exam for example involving the C7 vertebral body. Mixed lytic and sclerotic lesion involving the T1 vertebral body and the right T1 pedicle and transverse process. Additional sclerotic lesion at the posterior aspect of T2 and T3. Additional small area of sclerosis involving the superior endplate of T12. Small sclerotic lesion at T8 level measuring 6 mm. Chronic deformities of left posterior sixth and seventh ribs likely related to prior thoracotomy.  Abdomen and pelvis: The liver is normal in size and contour. Spleen borderline enlarged. Normal adrenal glands. Pancreas without findings of pancreatitis. The gallbladder is present. No pericholecystic inflammation. Kidneys demonstrate symmetric enhancement. No obstructive uropathy. Urinary bladder is thin walled. Prostate borderline enlarged.  Negative for pneumoperitoneum. Sigmoid diverticulosis. No bowel obstruction. No findings of appendicitis. Stomach and proximal small bowel normally configured. The portal vein, splenic vein, and superior mesenteric vein are patent. Moderate atherosclerotic calcifications of the abdominal aorta and branch vasculature.  No fluid collection the abdomen or pelvis.  There are multiple osseous lesions consistent with metastatic disease for example sclerotic lesion involving the superior plate of L1 measuring 1.9 cm. Lytic lesion involving L2 vertebral body measuring 2.5 cm with mild height loss at the superior endplate. No retropulsed fracture fragment. Lytic lesion involving left anterior acetabulum measuring 2.5 cm. Small sclerotic lesions noted in the medial left iliac bone.      Impression: Chest: 1. Acute pulmonary emboli at the right main pulmonary artery extending into right upper lobe, right lower lobe and right middle lobe segmental pulmonary arteries. No findings of right heart strain. 2. Multiple bilateral pulmonary nodules and masses largest in right upper lobe measuring 4.5 cm and right lower lobe measuring 4.4 cm consistent with pulmonary metastatic disease. 3. Mixed lytic and sclerotic osseous metastases most pronounced at  T1. 4. Enlarged right hilar lymph nodes likely hilar metastatic adenopathy. 5. Emphysema with postsurgical changes of prior left upper lobectomy. 6. Coronary artery calcifications and additional chronic findings above.  Abdomen and pelvis: 1. No acute abnormality in the abdomen or pelvis. 2. Mixed lytic and sclerotic osseous lesions consistent with osseous metastases as above most pronounced at L2 and left anterior acetabulum. 3. Sigmoid diverticulosis without diverticulitis. 4. Additional chronic findings above.  I discussed findings with Dr. Suggs at 9:29 a.m. on 10/5/2022.  This report was finalized on 10/5/2022 9:31 AM by Srinath Rogers MD.      XR Chest 1 View    Result Date: 10/5/2022   DATE OF EXAM: 10/5/2022 8:04 AM  PROCEDURE: XR CHEST 1 VW-  INDICATIONS: pain  COMPARISON: 10/01/2020  TECHNIQUE: Portable chest radiograph.  FINDINGS: Patient is rotated slightly to the left. Stable postoperative changes of the left hemithorax. Coronary stent noted. No pneumothorax. Chronic blunting at the left  lateral costophrenic angle. There is nodular mass/consolidation overlying the periphery of the right lung which may relate to pneumonia could relate to pulmonary mass. Osseous structures are grossly intact.      Impression: Airspace disease overlying the periphery of the right midlung may relate to pulmonary mass or pneumonia. Recommend correlation with clinical findings and close follow-up to ensure resolution. Otherwise consider CT for further assessment.  Stable postsurgical changes of the left hemithorax.  This report was finalized on 10/5/2022 8:20 AM by Srinath Rogers MD.      CT Angiogram Chest    Result Date: 10/5/2022  DATE OF EXAM: 10/5/2022 8:32 AM  PROCEDURE: CT ANGIOGRAM CHEST-, CT ABDOMEN PELVIS W CONTRAST-  INDICATIONS: lung cancer with mets, coughing up blood  COMPARISON: CT chest abdomen and pelvis 10/22/2020  TECHNIQUE: Contiguous axial imaging was obtained of the chest, abdomen, and pelvis following the intravenous administration of 150 mL of Isovue 370. Reconstructed coronal and sagittal images were also obtained. Automated exposure control and iterative reconstruction methods were used.  The radiation dose reduction device was turned on for each scan per the ALARA (As Low as Reasonably Achievable) protocol.  FINDINGS: Chest: Soft tissues of the lower neck without acute abnormality. Right supraclavicular node measures 10 mm on image 12. Heart size normal. Extensive, three-vessel coronary artery calcifications. There are pulmonary emboli at the right main pulmonary artery bifurcation extending into right upper lobe anterior segment pulmonary artery, right middle lobar pulmonary artery, and the superior segment right lower lobe pulmonary artery. Negative for pulmonary embolus on left. Intraventricular septum is normal in position without findings to indicate right heart strain. Right hilar nodes enlarged measuring 1.3 cm on image 45. Additional right hilar tissue anterior to right mainstem bronchus  measures 2.2 cm on image 42.  Postsurgical changes of left upper lobectomy. No pneumothorax. Moderate emphysema. Multiple pulmonary masses and nodules most consistent with pulmonary metastatic disease for example involving right upper lobe abutting the pleura measuring 4.5 x 3.2 cm on image 35. Involving the right lower lobe measuring 4.4 x 3.9 cm on image 63. Additional nodule in the right middle lobe measures 8 mm on image 58. Right lower lobe nodules measure 2 cm on image 66 and 1 cm on image 67. Multiple pulmonary nodules also noted within the left lower lobe for example anteriorly abutting pleura measuring 2.1 cm on image 36. Additional left lower lobe nodule measures 9 mm on image 44. Multiple other smaller nodules noted bilaterally.  There are multifocal mixed lytic and sclerotic osseous lesions consistent with osseous metastasis, new from comparison exam for example involving the C7 vertebral body. Mixed lytic and sclerotic lesion involving the T1 vertebral body and the right T1 pedicle and transverse process. Additional sclerotic lesion at the posterior aspect of T2 and T3. Additional small area of sclerosis involving the superior endplate of T12. Small sclerotic lesion at T8 level measuring 6 mm. Chronic deformities of left posterior sixth and seventh ribs likely related to prior thoracotomy.  Abdomen and pelvis: The liver is normal in size and contour. Spleen borderline enlarged. Normal adrenal glands. Pancreas without findings of pancreatitis. The gallbladder is present. No pericholecystic inflammation. Kidneys demonstrate symmetric enhancement. No obstructive uropathy. Urinary bladder is thin walled. Prostate borderline enlarged.  Negative for pneumoperitoneum. Sigmoid diverticulosis. No bowel obstruction. No findings of appendicitis. Stomach and proximal small bowel normally configured. The portal vein, splenic vein, and superior mesenteric vein are patent. Moderate atherosclerotic calcifications of  the abdominal aorta and branch vasculature. No fluid collection the abdomen or pelvis.  There are multiple osseous lesions consistent with metastatic disease for example sclerotic lesion involving the superior plate of L1 measuring 1.9 cm. Lytic lesion involving L2 vertebral body measuring 2.5 cm with mild height loss at the superior endplate. No retropulsed fracture fragment. Lytic lesion involving left anterior acetabulum measuring 2.5 cm. Small sclerotic lesions noted in the medial left iliac bone.      Impression: Chest: 1. Acute pulmonary emboli at the right main pulmonary artery extending into right upper lobe, right lower lobe and right middle lobe segmental pulmonary arteries. No findings of right heart strain. 2. Multiple bilateral pulmonary nodules and masses largest in right upper lobe measuring 4.5 cm and right lower lobe measuring 4.4 cm consistent with pulmonary metastatic disease. 3. Mixed lytic and sclerotic osseous metastases most pronounced at  T1. 4. Enlarged right hilar lymph nodes likely hilar metastatic adenopathy. 5. Emphysema with postsurgical changes of prior left upper lobectomy. 6. Coronary artery calcifications and additional chronic findings above.  Abdomen and pelvis: 1. No acute abnormality in the abdomen or pelvis. 2. Mixed lytic and sclerotic osseous lesions consistent with osseous metastases as above most pronounced at L2 and left anterior acetabulum. 3. Sigmoid diverticulosis without diverticulitis. 4. Additional chronic findings above.  I discussed findings with Dr. Suggs at 9:29 a.m. on 10/5/2022.  This report was finalized on 10/5/2022 9:31 AM by Srinath Rogers MD.            I have reviewed the medications:  Scheduled Meds:aspirin, 81 mg, Oral, Daily  carvedilol, 12.5 mg, Oral, BID With Meals  losartan, 50 mg, Oral, Q24H  oxyCODONE-acetaminophen, 1 tablet, Oral, Once  pantoprazole, 40 mg, Oral, BID AC  sodium chloride, 10 mL, Intravenous, Q12H      Continuous Infusions:heparin,  18 Units/kg/hr, Last Rate: 18 Units/kg/hr (10/06/22 0300)  Pharmacy to Dose Heparin,       PRN Meds:.•  acetaminophen  •  HYDROmorphone  •  ondansetron  •  oxyCODONE-acetaminophen  •  Pharmacy to Dose Heparin  •  sodium chloride  •  [COMPLETED] Insert peripheral IV **AND** sodium chloride  •  sodium chloride    Assessment & Plan   Assessment & Plan     Active Hospital Problems    Diagnosis  POA   • **Other acute pulmonary embolism without acute cor pulmonale (HCC) [I26.99]  Yes   • Pain [R52]  Unknown   • Hypertension [I10]  Yes   • Adenocarcinoma of left lung (HCC) [C34.92]  Yes      Resolved Hospital Problems   No resolved problems to display.        Brief Hospital Course to date:  Eliazar Morley is a 62 y.o. male with known metastatic adenocarcinoma of the lung, HTN, HLD, DM2 who presented to the hospital for evaluation of pain; he is without a PCP and out of pain medication, also has elected to discontinue oncologic care/chemotherapy, currently in process of getting a new PCP; initial work-up/evaluation demonstrated right main pulmonary artery embolus with extension to upper/middle/lower lobe segments; he was admitted for pain control, management of PE, and has elected to ultimately discharged home with hospice    The following problems are new to me today    Assessment/plan    Acute right main pulmonary artery pulmonary embolus  - CTA chest as noted, right main pulmonary artery embolus with extension to upper/middle/lower lobe segments, no evidence of heart strain; also identified multiple nodules/masses  - Transition heparin gtt to apixaban 10mg BID x7 days then 5mg BID indefinitely    Diffusely metastatic adenocarcinoma of the lung  Bony mets, lung nodules/masses, metastatic adenopathy  Cancer associated pain  - No longer wishes for oncologic care/treatment for his cancer  -Palliative care following and adjusting pain medication regimen  - Hospice consulted this a.m., patient has elected for home hospice  once pain meds situated    Hypertension  Hyperlipidemia  -On Repatha outpatient  -Carvedilol, losartan      Expected Discharge Location and Transportation: Home with hospice  Expected Discharge Date: 10/7  *It appears his appointment with Dr. Miller to establish care has been rescheduled for 10/11/22    DVT prophylaxis:  Medical DVT prophylaxis orders are present.     AM-PAC 6 Clicks Score (PT): 22 (10/05/22 1542)    CODE STATUS:   Code Status and Medical Interventions:   Ordered at: 10/05/22 1228     Medical Intervention Limits:    NO intubation (DNI)    NO vasopressors    NO dialysis     Code Status (Patient has no pulse and is not breathing):    No CPR (Do Not Attempt to Resuscitate)     Medical Interventions (Patient has pulse or is breathing):    Limited Support     Comments:    d/w patient       Cristi Zamarripa Aleksandr, DO  10/06/22

## 2022-10-07 ENCOUNTER — READMISSION MANAGEMENT (OUTPATIENT)
Dept: CALL CENTER | Facility: HOSPITAL | Age: 62
End: 2022-10-07

## 2022-10-07 VITALS
BODY MASS INDEX: 29.35 KG/M2 | RESPIRATION RATE: 18 BRPM | HEIGHT: 70 IN | TEMPERATURE: 98 F | SYSTOLIC BLOOD PRESSURE: 142 MMHG | HEART RATE: 53 BPM | DIASTOLIC BLOOD PRESSURE: 54 MMHG | WEIGHT: 205 LBS | OXYGEN SATURATION: 97 %

## 2022-10-07 PROBLEM — I26.99 OTHER ACUTE PULMONARY EMBOLISM WITHOUT ACUTE COR PULMONALE (HCC): Status: ACTIVE | Noted: 2022-10-07

## 2022-10-07 PROCEDURE — 99217 PR OBSERVATION CARE DISCHARGE MANAGEMENT: CPT | Performed by: NURSE PRACTITIONER

## 2022-10-07 PROCEDURE — G0378 HOSPITAL OBSERVATION PER HR: HCPCS

## 2022-10-07 RX ORDER — MORPHINE SULFATE 15 MG/1
15 TABLET, FILM COATED, EXTENDED RELEASE ORAL ONCE
Status: COMPLETED | OUTPATIENT
Start: 2022-10-07 | End: 2022-10-07

## 2022-10-07 RX ORDER — OXYCODONE HYDROCHLORIDE 10 MG/1
10 TABLET ORAL EVERY 4 HOURS PRN
Qty: 20 TABLET | Refills: 0 | Status: SHIPPED | OUTPATIENT
Start: 2022-10-07 | End: 2022-10-13

## 2022-10-07 RX ORDER — FENTANYL 25 UG/H
1 PATCH TRANSDERMAL
Status: DISCONTINUED | OUTPATIENT
Start: 2022-10-07 | End: 2022-10-07 | Stop reason: HOSPADM

## 2022-10-07 RX ORDER — ONDANSETRON 4 MG/1
4 TABLET, FILM COATED ORAL EVERY 6 HOURS PRN
Qty: 20 TABLET | Refills: 0 | Status: SHIPPED | OUTPATIENT
Start: 2022-10-07

## 2022-10-07 RX ORDER — LOSARTAN POTASSIUM 25 MG/1
25 TABLET ORAL
Status: DISCONTINUED | OUTPATIENT
Start: 2022-10-07 | End: 2022-10-07 | Stop reason: HOSPADM

## 2022-10-07 RX ORDER — ACETAMINOPHEN 325 MG/1
650 TABLET ORAL EVERY 4 HOURS PRN
Start: 2022-10-07

## 2022-10-07 RX ORDER — FENTANYL 25 UG/H
1 PATCH TRANSDERMAL
Qty: 5 PATCH | Refills: 0 | Status: SHIPPED | OUTPATIENT
Start: 2022-10-07 | End: 2023-10-07

## 2022-10-07 RX ORDER — IRBESARTAN 150 MG/1
75 TABLET ORAL DAILY
Qty: 90 TABLET | Refills: 1
Start: 2022-10-07

## 2022-10-07 RX ADMIN — PANTOPRAZOLE SODIUM 40 MG: 40 TABLET, DELAYED RELEASE ORAL at 05:45

## 2022-10-07 RX ADMIN — MORPHINE SULFATE 15 MG: 15 TABLET, FILM COATED, EXTENDED RELEASE ORAL at 05:45

## 2022-10-07 RX ADMIN — OXYCODONE HYDROCHLORIDE AND ACETAMINOPHEN 1 TABLET: 10; 325 TABLET ORAL at 09:15

## 2022-10-07 RX ADMIN — OXYCODONE 10 MG: 5 TABLET ORAL at 06:44

## 2022-10-07 RX ADMIN — ACETAMINOPHEN 650 MG: 325 TABLET ORAL at 05:45

## 2022-10-07 RX ADMIN — Medication 10 ML: at 09:15

## 2022-10-07 RX ADMIN — APIXABAN 10 MG: 5 TABLET, FILM COATED ORAL at 09:14

## 2022-10-07 RX ADMIN — OXYCODONE 10 MG: 5 TABLET ORAL at 02:27

## 2022-10-07 RX ADMIN — MORPHINE SULFATE 15 MG: 15 TABLET, FILM COATED, EXTENDED RELEASE ORAL at 13:05

## 2022-10-07 RX ADMIN — OXYCODONE HYDROCHLORIDE AND ACETAMINOPHEN 1 TABLET: 10; 325 TABLET ORAL at 04:08

## 2022-10-07 RX ADMIN — CARVEDILOL 3.12 MG: 3.12 TABLET, FILM COATED ORAL at 09:14

## 2022-10-07 RX ADMIN — ASPIRIN 81 MG: 81 TABLET, COATED ORAL at 09:14

## 2022-10-07 RX ADMIN — LOSARTAN POTASSIUM 25 MG: 25 TABLET, FILM COATED ORAL at 09:14

## 2022-10-07 RX ADMIN — ACETAMINOPHEN 650 MG: 325 TABLET ORAL at 14:55

## 2022-10-07 RX ADMIN — OXYCODONE 10 MG: 5 TABLET ORAL at 11:27

## 2022-10-07 RX ADMIN — OXYCODONE 10 MG: 5 TABLET ORAL at 15:47

## 2022-10-07 RX ADMIN — FENTANYL 1 PATCH: 25 PATCH TRANSDERMAL at 11:27

## 2022-10-07 NOTE — PLAN OF CARE
Goal Outcome Evaluation:              Outcome Evaluation: Pt VSS, pain controlled with PO scheduled and prn medication. Pt teary and anxious at times this AM. Education given and family at bedside. Pt stated he feels a bit better. Awaiting discharge later today. Will cont to monitor.

## 2022-10-07 NOTE — PLAN OF CARE
Goal Outcome Evaluation:         Pain uncontrollable this PM after giving all available medications. Pt able to find some relief after a while of trying different medication combinations. No other changes at this time. Continuing to monitor.

## 2022-10-07 NOTE — PROGRESS NOTES
Continued Stay Note  [facility]     Patient Name: Eliazar Morley  MRN: [id number]  Today's Date: 10/7/2022    Admit Date: 10/5/2022    Plan: home with [facility]   Discharge Plan     Row Name 10/07/22 1102       Plan    Plan home with [facility]    Final Discharge Disposition Code 50 - home with hospice    Final Note F/u visit made to bedside.  Pt to dc home ([address] [facility] , [place], [place]) today via private car; will provide portable O2 for transport.  DME (O2 PRN) to be delivered after pt arrives home per his request.  Pt has no supply needs currently.  5-day supply of meds to be sent home via [facility]bed[facility].  Hospice 24h number given to pt and daughter who verbalized understanding to call once pt arrives home to be admitted to hospice services.  Team and CM updated.  Will send dc summary to hospice intake when available.               Discharge Codes    No documentation.               Expected Discharge Date and Time     Expected Discharge Date Expected Discharge Time    Oct 10, 2022             Ernestine Koehler RN

## 2022-10-07 NOTE — DISCHARGE PLACEMENT REQUEST
"America Morley (62 y.o. Male)             Date of Birth   1960    Social Security Number       Address   [address] [place] [address]    Home Phone   [phone]    MRN   [id number]       Advent   Confucianism    Marital Status                               Admission Date   10/5/22    Admission Type   Emergency    Admitting Provider   Cristi Brandon DO    Attending Provider   Cristi Brandon DO    Department, Room/Bed   [address], S448/1       Discharge Date       Discharge Disposition   Home or Self Care    Discharge Destination                               Attending Provider: Cristi Brandon DO    Allergies: No Known Allergies    Isolation: None   Infection: None   Code Status: No CPR   Advance Care Planning Activity    Ht: 177.8 cm (70\")   Wt: 93 kg (205 lb)    Admission Cmt: None   Principal Problem: Pulmonary embolus (HCC) [I26.99]                 Active Insurance as of 10/5/2022     Primary Coverage     Payor Plan Insurance Group Employer/Plan Group    MEDICARE MEDICARE A & B      Payor Plan Address Payor Plan Phone Number Payor Plan Fax Number Effective Dates    [address] [phone]  10/1/2021 - None Entered    [facility] [id number]       Subscriber Name Subscriber Birth Date Member ID       AMERICA MORLEY 1960 [id number]           Secondary Coverage     Payor Plan Insurance Group Employer/Plan Group    ANTHEM BLUE CROSS ANTHEM BLUE CROSS BLUE SHIELD PPO [id number]     Payor Plan Address Payor Plan Phone Number Payor Plan Fax Number Effective Dates    [address] [phone]  1/1/2016 - None Entered    [facility] [id number]       Subscriber Name Subscriber Birth Date Member ID       AMERICA MORLEY 1960 [id number]                 Emergency Contacts      (Rel.) Home Phone Work Phone Mobile Phone    Chica Hager (Daughter) -- -- [phone]                 Discharge Summary      Ana Laura Barnard, " APRN at 10/07/22 1124              [facility] Medicine Services  DISCHARGE SUMMARY    Patient Name: Eliazar Morley  : 1960  MRN: [id number]    Date of Admission: 10/5/2022  7:00 AM  Date of Discharge:  10/7/2022  Primary Care Physician: Provider, No Known    Consults     Date and Time Order Name Status Description    10/5/2022  3:41 PM Inpatient Palliative Care MD Consult Completed           Hospital Course     Presenting Problem:   Other acute pulmonary embolism without acute cor pulmonale (HCC) [I26.99]    Active Hospital Problems    Diagnosis  POA   • **Pulmonary embolus (HCC) [I26.99]  Unknown   • Other acute pulmonary embolism without acute cor pulmonale (HCC) [I26.99]  Yes   • Pain [R52]  Unknown   • Hypertension [I10]  Yes   • Adenocarcinoma of left lung (HCC) [C34.92]  Yes      Resolved Hospital Problems   No resolved problems to display.          Hospital Course:  Eliazar Morley is a 62 y.o. male  with known metastatic adenocarcinoma of the lung, HTN, HLD, DM2 who presented to the hospital for evaluation of pain; he is without a PCP and out of pain medication, also has elected to discontinue oncologic care/chemotherapy, currently in process of getting a new PCP; initial work-up/evaluation demonstrated right main pulmonary artery embolus with extension to upper/middle/lower lobe segments; he was admitted for pain control, management of PE, and has elected to ultimately discharged home with hospice        Acute right main pulmonary artery pulmonary embolus  - CTA chest as noted, right main pulmonary artery embolus with extension to upper/middle/lower lobe segments, no evidence of heart strain; also identified multiple nodules/masses  - Transition heparin gtt to apixaban 10mg BID x7 days then 5mg BID indefinitely. Rx sent to pharmacy. Hospice to assume care/ further management     Diffusely metastatic adenocarcinoma of the lung  Bony mets, lung nodules/masses, metastatic  adenopathy  Cancer associated pain  - No longer wishes for oncologic care/treatment for his cancer  -Palliative care following and adjusted pain medication regimen. Rx sent to pharmacy per Palliative   - Hospice consulted, patient has elected for home hospice with plans to DC home today     Hypertension  Hyperlipidemia  -On Repatha outpatient  -reduced irbesartan to 75mg and discontinued coreg due to lower BP and bradycardia    Discharge Follow Up Recommendations for outpatient labs/diagnostics:  Hospice MD/ service to follow at home upon DC    Day of Discharge     HPI:   Patient reports general discomfort and aching, but improved. No overnight issues. Breathing ok, no cough or significant dyspnea.    Review of Systems  Gen- No fevers, chills  CV- No palpitations- generalized right sided shoulder/ chest/ back pain  Resp- No cough, dyspnea  GI- No N/V/D, abd pain        Vital Signs:   Temp:  [98 °F (36.7 °C)-98.2 °F (36.8 °C)] 98 °F (36.7 °C)  Heart Rate:  [47-57] 53  Resp:  [18] 18  BP: (114-142)/(54-67) 142/54  Flow (L/min):  [2] 2      Physical Exam:  Constitutional: No acute distress, awake, alert  HENT: NCAT, mucous membranes moist  Respiratory: Clear to auscultation bilaterally- diminished bilat, respiratory effort normal 2LNC  Cardiovascular: RRR, no murmurs, rubs, or gallops  Gastrointestinal: Positive bowel sounds, soft, nontender, nondistended  Musculoskeletal: No bilateral ankle edema  Psychiatric: Appropriate affect, cooperative  Neurologic: Oriented x 3, GASTON freely, speech clear  Skin: No rashes      Pertinent  and/or Most Recent Results     LAB RESULTS:      Lab 10/06/22  1432 10/06/22  0819 10/05/22  2251 10/05/22  1627 10/05/22  1021 10/05/22  0710   WBC  --   --   --   --   --  9.85   HEMOGLOBIN  --   --   --   --   --  15.0   HEMATOCRIT  --   --   --   --   --  45.4   PLATELETS  --   --   --   --   --  146   NEUTROS ABS  --   --   --   --   --  6.69   IMMATURE GRANS (ABS)  --   --   --   --   --   0.05   LYMPHS ABS  --   --   --   --   --  2.03   MONOS ABS  --   --   --   --   --  0.84   EOS ABS  --   --   --   --   --  0.19   MCV  --   --   --   --   --  91.2   PROTIME  --   --   --   --   --  13.3   APTT  --   --   --   --   --  36.7*   HEPARIN ANTI-XA 0.32 0.26* 0.26* 0.32 0.10*  --          Lab 10/05/22  0710   SODIUM 142   POTASSIUM 4.3   CHLORIDE 103   CO2 24.0   ANION GAP 15.0   BUN 19   CREATININE 0.93   EGFR 92.3   GLUCOSE 103*   CALCIUM 10.1         Lab 10/05/22  0710   TOTAL PROTEIN 7.7   ALBUMIN 4.40   GLOBULIN 3.3   ALT (SGPT) 18   AST (SGOT) 22   BILIRUBIN 0.5   ALK PHOS 176*   LIPASE 40         Lab 10/05/22  0710   PROBNP 147.4   TROPONIN T <0.010   PROTIME 13.3   INR 1.02                 Brief Urine Lab Results  (Last result in the past 365 days)      Color   Clarity   Blood   Leuk Est   Nitrite   Protein   CREAT   Urine HCG        10/05/22 1001 Yellow   Clear   Negative   Negative   Negative   Negative               Microbiology Results (last 10 days)     ** No results found for the last 240 hours. **          CT Abdomen Pelvis With Contrast    Result Date: 10/5/2022  DATE OF EXAM: 10/5/2022 8:32 AM  PROCEDURE: CT ANGIOGRAM CHEST-, CT ABDOMEN PELVIS W CONTRAST-  INDICATIONS: lung cancer with mets, coughing up blood  COMPARISON: CT chest abdomen and pelvis 10/22/2020  TECHNIQUE: Contiguous axial imaging was obtained of the chest, abdomen, and pelvis following the intravenous administration of 150 mL of Isovue 370. Reconstructed coronal and sagittal images were also obtained. Automated exposure control and iterative reconstruction methods were used.  The radiation dose reduction device was turned on for each scan per the ALARA (As Low as Reasonably Achievable) protocol.  FINDINGS: Chest: Soft tissues of the lower neck without acute abnormality. Right supraclavicular node measures 10 mm on image 12. Heart size normal. Extensive, three-vessel coronary artery calcifications. There are pulmonary  emboli at the right main pulmonary artery bifurcation extending into right upper lobe anterior segment pulmonary artery, right middle lobar pulmonary artery, and the superior segment right lower lobe pulmonary artery. Negative for pulmonary embolus on left. Intraventricular septum is normal in position without findings to indicate right heart strain. Right hilar nodes enlarged measuring 1.3 cm on image 45. Additional right hilar tissue anterior to right mainstem bronchus measures 2.2 cm on image 42.  Postsurgical changes of left upper lobectomy. No pneumothorax. Moderate emphysema. Multiple pulmonary masses and nodules most consistent with pulmonary metastatic disease for example involving right upper lobe abutting the pleura measuring 4.5 x 3.2 cm on image 35. Involving the right lower lobe measuring 4.4 x 3.9 cm on image 63. Additional nodule in the right middle lobe measures 8 mm on image 58. Right lower lobe nodules measure 2 cm on image 66 and 1 cm on image 67. Multiple pulmonary nodules also noted within the left lower lobe for example anteriorly abutting pleura measuring 2.1 cm on image 36. Additional left lower lobe nodule measures 9 mm on image 44. Multiple other smaller nodules noted bilaterally.  There are multifocal mixed lytic and sclerotic osseous lesions consistent with osseous metastasis, new from comparison exam for example involving the C7 vertebral body. Mixed lytic and sclerotic lesion involving the T1 vertebral body and the right T1 pedicle and transverse process. Additional sclerotic lesion at the posterior aspect of T2 and T3. Additional small area of sclerosis involving the superior endplate of T12. Small sclerotic lesion at T8 level measuring 6 mm. Chronic deformities of left posterior sixth and seventh ribs likely related to prior thoracotomy.  Abdomen and pelvis: The liver is normal in size and contour. Spleen borderline enlarged. Normal adrenal glands. Pancreas without findings of  pancreatitis. The gallbladder is present. No pericholecystic inflammation. Kidneys demonstrate symmetric enhancement. No obstructive uropathy. Urinary bladder is thin walled. Prostate borderline enlarged.  Negative for pneumoperitoneum. Sigmoid diverticulosis. No bowel obstruction. No findings of appendicitis. Stomach and proximal small bowel normally configured. The portal vein, splenic vein, and superior mesenteric vein are patent. Moderate atherosclerotic calcifications of the abdominal aorta and branch vasculature. No fluid collection the abdomen or pelvis.  There are multiple osseous lesions consistent with metastatic disease for example sclerotic lesion involving the superior plate of L1 measuring 1.9 cm. Lytic lesion involving L2 vertebral body measuring 2.5 cm with mild height loss at the superior endplate. No retropulsed fracture fragment. Lytic lesion involving left anterior acetabulum measuring 2.5 cm. Small sclerotic lesions noted in the medial left iliac bone.      Chest: 1. Acute pulmonary emboli at the right main pulmonary artery extending into right upper lobe, right lower lobe and right middle lobe segmental pulmonary arteries. No findings of right heart strain. 2. Multiple bilateral pulmonary nodules and masses largest in right upper lobe measuring 4.5 cm and right lower lobe measuring 4.4 cm consistent with pulmonary metastatic disease. 3. Mixed lytic and sclerotic osseous metastases most pronounced at  T1. 4. Enlarged right hilar lymph nodes likely hilar metastatic adenopathy. 5. Emphysema with postsurgical changes of prior left upper lobectomy. 6. Coronary artery calcifications and additional chronic findings above.  Abdomen and pelvis: 1. No acute abnormality in the abdomen or pelvis. 2. Mixed lytic and sclerotic osseous lesions consistent with osseous metastases as above most pronounced at L2 and left anterior acetabulum. 3. Sigmoid diverticulosis without diverticulitis. 4. Additional chronic  findings above.  I discussed findings with Dr. Suggs at 9:29 a.m. on 10/5/2022.  This report was finalized on 10/5/2022 9:31 AM by Srinath Rogers MD.      XR Chest 1 View    Result Date: 10/5/2022   DATE OF EXAM: 10/5/2022 8:04 AM  PROCEDURE: XR CHEST 1 VW-  INDICATIONS: pain  COMPARISON: 10/01/2020  TECHNIQUE: Portable chest radiograph.  FINDINGS: Patient is rotated slightly to the left. Stable postoperative changes of the left hemithorax. Coronary stent noted. No pneumothorax. Chronic blunting at the left lateral costophrenic angle. There is nodular mass/consolidation overlying the periphery of the right lung which may relate to pneumonia could relate to pulmonary mass. Osseous structures are grossly intact.      Airspace disease overlying the periphery of the right midlung may relate to pulmonary mass or pneumonia. Recommend correlation with clinical findings and close follow-up to ensure resolution. Otherwise consider CT for further assessment.  Stable postsurgical changes of the left hemithorax.  This report was finalized on 10/5/2022 8:20 AM by Srinath Rogers MD.      CT Angiogram Chest    Result Date: 10/5/2022  DATE OF EXAM: 10/5/2022 8:32 AM  PROCEDURE: CT ANGIOGRAM CHEST-, CT ABDOMEN PELVIS W CONTRAST-  INDICATIONS: lung cancer with mets, coughing up blood  COMPARISON: CT chest abdomen and pelvis 10/22/2020  TECHNIQUE: Contiguous axial imaging was obtained of the chest, abdomen, and pelvis following the intravenous administration of 150 mL of Isovue 370. Reconstructed coronal and sagittal images were also obtained. Automated exposure control and iterative reconstruction methods were used.  The radiation dose reduction device was turned on for each scan per the ALARA (As Low as Reasonably Achievable) protocol.  FINDINGS: Chest: Soft tissues of the lower neck without acute abnormality. Right supraclavicular node measures 10 mm on image 12. Heart size normal. Extensive, three-vessel coronary artery  calcifications. There are pulmonary emboli at the right main pulmonary artery bifurcation extending into right upper lobe anterior segment pulmonary artery, right middle lobar pulmonary artery, and the superior segment right lower lobe pulmonary artery. Negative for pulmonary embolus on left. Intraventricular septum is normal in position without findings to indicate right heart strain. Right hilar nodes enlarged measuring 1.3 cm on image 45. Additional right hilar tissue anterior to right mainstem bronchus measures 2.2 cm on image 42.  Postsurgical changes of left upper lobectomy. No pneumothorax. Moderate emphysema. Multiple pulmonary masses and nodules most consistent with pulmonary metastatic disease for example involving right upper lobe abutting the pleura measuring 4.5 x 3.2 cm on image 35. Involving the right lower lobe measuring 4.4 x 3.9 cm on image 63. Additional nodule in the right middle lobe measures 8 mm on image 58. Right lower lobe nodules measure 2 cm on image 66 and 1 cm on image 67. Multiple pulmonary nodules also noted within the left lower lobe for example anteriorly abutting pleura measuring 2.1 cm on image 36. Additional left lower lobe nodule measures 9 mm on image 44. Multiple other smaller nodules noted bilaterally.  There are multifocal mixed lytic and sclerotic osseous lesions consistent with osseous metastasis, new from comparison exam for example involving the C7 vertebral body. Mixed lytic and sclerotic lesion involving the T1 vertebral body and the right T1 pedicle and transverse process. Additional sclerotic lesion at the posterior aspect of T2 and T3. Additional small area of sclerosis involving the superior endplate of T12. Small sclerotic lesion at T8 level measuring 6 mm. Chronic deformities of left posterior sixth and seventh ribs likely related to prior thoracotomy.  Abdomen and pelvis: The liver is normal in size and contour. Spleen borderline enlarged. Normal adrenal glands.  Pancreas without findings of pancreatitis. The gallbladder is present. No pericholecystic inflammation. Kidneys demonstrate symmetric enhancement. No obstructive uropathy. Urinary bladder is thin walled. Prostate borderline enlarged.  Negative for pneumoperitoneum. Sigmoid diverticulosis. No bowel obstruction. No findings of appendicitis. Stomach and proximal small bowel normally configured. The portal vein, splenic vein, and superior mesenteric vein are patent. Moderate atherosclerotic calcifications of the abdominal aorta and branch vasculature. No fluid collection the abdomen or pelvis.  There are multiple osseous lesions consistent with metastatic disease for example sclerotic lesion involving the superior plate of L1 measuring 1.9 cm. Lytic lesion involving L2 vertebral body measuring 2.5 cm with mild height loss at the superior endplate. No retropulsed fracture fragment. Lytic lesion involving left anterior acetabulum measuring 2.5 cm. Small sclerotic lesions noted in the medial left iliac bone.      Chest: 1. Acute pulmonary emboli at the right main pulmonary artery extending into right upper lobe, right lower lobe and right middle lobe segmental pulmonary arteries. No findings of right heart strain. 2. Multiple bilateral pulmonary nodules and masses largest in right upper lobe measuring 4.5 cm and right lower lobe measuring 4.4 cm consistent with pulmonary metastatic disease. 3. Mixed lytic and sclerotic osseous metastases most pronounced at  T1. 4. Enlarged right hilar lymph nodes likely hilar metastatic adenopathy. 5. Emphysema with postsurgical changes of prior left upper lobectomy. 6. Coronary artery calcifications and additional chronic findings above.  Abdomen and pelvis: 1. No acute abnormality in the abdomen or pelvis. 2. Mixed lytic and sclerotic osseous lesions consistent with osseous metastases as above most pronounced at L2 and left anterior acetabulum. 3. Sigmoid diverticulosis without  diverticulitis. 4. Additional chronic findings above.  I discussed findings with Dr. Suggs at 9:29 a.m. on 10/5/2022.  This report was finalized on 10/5/2022 9:31 AM by Srinath Rogers MD.                    Plan for Follow-up of Pending Labs/Results:     Discharge Details        Discharge Medications      New Medications      Instructions Start Date   acetaminophen 325 MG tablet  Commonly known as: TYLENOL   650 mg, Oral, Every 4 Hours PRN      apixaban 5 MG tablet tablet  Commonly known as: ELIQUIS   10 mg, Oral, Every 12 Hours Scheduled      apixaban 5 MG tablet tablet  Commonly known as: ELIQUIS   5 mg, Oral, Every 12 Hours Scheduled   Start Date: October 13, 2022     fentaNYL 25 MCG/HR patch  Commonly known as: DURAGESIC   1 patch, Transdermal, Every 72 Hours      ondansetron 4 MG tablet  Commonly known as: ZOFRAN   4 mg, Oral, Every 6 Hours PRN      oxyCODONE 10 MG tablet  Commonly known as: ROXICODONE   10 mg, Oral, Every 4 Hours PRN         Changes to Medications      Instructions Start Date   irbesartan 150 MG tablet  Commonly known as: AVAPRO  What changed: how much to take   75 mg, Oral, Daily         Continue These Medications      Instructions Start Date   aspirin 81 MG EC tablet   81 mg, Oral, Daily      pantoprazole 40 MG EC tablet  Commonly known as: PROTONIX   40 mg, Oral, 2 Times Daily      Repatha SureClick solution auto-injector SureClick injection  Generic drug: Evolocumab   140 mg, Subcutaneous, Every 14 Days         Stop These Medications    carvedilol 12.5 MG tablet  Commonly known as: COREG     naproxen sodium 220 MG tablet  Commonly known as: ALEVE     vitamin B-12 500 MCG tablet  Commonly known as: CYANOCOBALAMIN     Vitamin D3 50 MCG (2000 UT) tablet            No Known Allergies      Discharge Disposition:  Home or Self Care    Diet:  Hospital:  Diet Order   Procedures   • Diet Regular       Activity:As tolerated  Activity Instructions     Activity as Tolerated            Restrictions or  Other Recommendations:         CODE STATUS:    Code Status and Medical Interventions:   Ordered at: 10/05/22 1228     Medical Intervention Limits:    NO intubation (DNI)    NO vasopressors    NO dialysis     Code Status (Patient has no pulse and is not breathing):    No CPR (Do Not Attempt to Resuscitate)     Medical Interventions (Patient has pulse or is breathing):    Limited Support     Comments:    d/w patient       No future appointments.    Additional Instructions for the Follow-ups that You Need to Schedule     Discharge Follow-up with Specified Provider: Hospice to follow at home   As directed      To: Hospice to follow at home                     MANA Penaloza  10/07/22      Time Spent on Discharge:  I spent  50 minutes on this discharge activity which included: face-to-face encounter with the patient, reviewing the data in the system, coordination of the care with the nursing staff as well as consultants, documentation, and entering orders.        Electronically signed by MANA Penaloza, 10/07/22, 11:24 AM EDT.      Electronically signed by Ana Laura Barnard APRN at 10/07/22 1149

## 2022-10-07 NOTE — DISCHARGE SUMMARY
[facility] Medicine Services  DISCHARGE SUMMARY    Patient Name: Eliazar Morley  : 1960  MRN: [id number]    Date of Admission: 10/5/2022  7:00 AM  Date of Discharge:  10/7/2022  Primary Care Physician: Provider, No Known    Consults     Date and Time Order Name Status Description    10/5/2022  3:41 PM Inpatient Palliative Care MD Consult Completed           Hospital Course     Presenting Problem:   Other acute pulmonary embolism without acute cor pulmonale (HCC) [I26.99]    Active Hospital Problems    Diagnosis  POA   • **Pulmonary embolus (HCC) [I26.99]  Unknown   • Other acute pulmonary embolism without acute cor pulmonale (HCC) [I26.99]  Yes   • Pain [R52]  Unknown   • Hypertension [I10]  Yes   • Adenocarcinoma of left lung (HCC) [C34.92]  Yes      Resolved Hospital Problems   No resolved problems to display.          Hospital Course:  Eliazar Morley is a 62 y.o. male  with known metastatic adenocarcinoma of the lung, HTN, HLD, DM2 who presented to the hospital for evaluation of pain; he is without a PCP and out of pain medication, also has elected to discontinue oncologic care/chemotherapy, currently in process of getting a new PCP; initial work-up/evaluation demonstrated right main pulmonary artery embolus with extension to upper/middle/lower lobe segments; he was admitted for pain control, management of PE, and has elected to ultimately discharged home with hospice        Acute right main pulmonary artery pulmonary embolus  - CTA chest as noted, right main pulmonary artery embolus with extension to upper/middle/lower lobe segments, no evidence of heart strain; also identified multiple nodules/masses  - Transition heparin gtt to apixaban 10mg BID x7 days then 5mg BID indefinitely. Rx sent to pharmacy. Hospice to assume care/ further management     Diffusely metastatic adenocarcinoma of the lung  Bony mets, lung nodules/masses, metastatic adenopathy  Cancer associated  pain  - No longer wishes for oncologic care/treatment for his cancer  -Palliative care following and adjusted pain medication regimen. Rx sent to pharmacy per Palliative   - Hospice consulted, patient has elected for home hospice with plans to DC home today     Hypertension  Hyperlipidemia  -On Repatha outpatient  -reduced irbesartan to 75mg and discontinued coreg due to lower BP and bradycardia    Discharge Follow Up Recommendations for outpatient labs/diagnostics:  Hospice MD/ service to follow at home upon DC    Day of Discharge     HPI:   Patient reports general discomfort and aching, but improved. No overnight issues. Breathing ok, no cough or significant dyspnea.    Review of Systems  Gen- No fevers, chills  CV- No palpitations- generalized right sided shoulder/ chest/ back pain  Resp- No cough, dyspnea  GI- No N/V/D, abd pain        Vital Signs:   Temp:  [98 °F (36.7 °C)-98.2 °F (36.8 °C)] 98 °F (36.7 °C)  Heart Rate:  [47-57] 53  Resp:  [18] 18  BP: (114-142)/(54-67) 142/54  Flow (L/min):  [2] 2      Physical Exam:  Constitutional: No acute distress, awake, alert  HENT: NCAT, mucous membranes moist  Respiratory: Clear to auscultation bilaterally- diminished bilat, respiratory effort normal 2LNC  Cardiovascular: RRR, no murmurs, rubs, or gallops  Gastrointestinal: Positive bowel sounds, soft, nontender, nondistended  Musculoskeletal: No bilateral ankle edema  Psychiatric: Appropriate affect, cooperative  Neurologic: Oriented x 3, GASTON freely, speech clear  Skin: No rashes      Pertinent  and/or Most Recent Results     LAB RESULTS:      Lab 10/06/22  1432 10/06/22  0819 10/05/22  2251 10/05/22  1627 10/05/22  1021 10/05/22  0710   WBC  --   --   --   --   --  9.85   HEMOGLOBIN  --   --   --   --   --  15.0   HEMATOCRIT  --   --   --   --   --  45.4   PLATELETS  --   --   --   --   --  146   NEUTROS ABS  --   --   --   --   --  6.69   IMMATURE GRANS (ABS)  --   --   --   --   --  0.05   LYMPHS ABS  --   --   --    --   --  2.03   MONOS ABS  --   --   --   --   --  0.84   EOS ABS  --   --   --   --   --  0.19   MCV  --   --   --   --   --  91.2   PROTIME  --   --   --   --   --  13.3   APTT  --   --   --   --   --  36.7*   HEPARIN ANTI-XA 0.32 0.26* 0.26* 0.32 0.10*  --          Lab 10/05/22  0710   SODIUM 142   POTASSIUM 4.3   CHLORIDE 103   CO2 24.0   ANION GAP 15.0   BUN 19   CREATININE 0.93   EGFR 92.3   GLUCOSE 103*   CALCIUM 10.1         Lab 10/05/22  0710   TOTAL PROTEIN 7.7   ALBUMIN 4.40   GLOBULIN 3.3   ALT (SGPT) 18   AST (SGOT) 22   BILIRUBIN 0.5   ALK PHOS 176*   LIPASE 40         Lab 10/05/22  0710   PROBNP 147.4   TROPONIN T <0.010   PROTIME 13.3   INR 1.02                 Brief Urine Lab Results  (Last result in the past 365 days)      Color   Clarity   Blood   Leuk Est   Nitrite   Protein   CREAT   Urine HCG        10/05/22 1001 Yellow   Clear   Negative   Negative   Negative   Negative               Microbiology Results (last 10 days)     ** No results found for the last 240 hours. **          CT Abdomen Pelvis With Contrast    Result Date: 10/5/2022  DATE OF EXAM: 10/5/2022 8:32 AM  PROCEDURE: CT ANGIOGRAM CHEST-, CT ABDOMEN PELVIS W CONTRAST-  INDICATIONS: lung cancer with mets, coughing up blood  COMPARISON: CT chest abdomen and pelvis 10/22/2020  TECHNIQUE: Contiguous axial imaging was obtained of the chest, abdomen, and pelvis following the intravenous administration of 150 mL of Isovue 370. Reconstructed coronal and sagittal images were also obtained. Automated exposure control and iterative reconstruction methods were used.  The radiation dose reduction device was turned on for each scan per the ALARA (As Low as Reasonably Achievable) protocol.  FINDINGS: Chest: Soft tissues of the lower neck without acute abnormality. Right supraclavicular node measures 10 mm on image 12. Heart size normal. Extensive, three-vessel coronary artery calcifications. There are pulmonary emboli at the right main pulmonary  artery bifurcation extending into right upper lobe anterior segment pulmonary artery, right middle lobar pulmonary artery, and the superior segment right lower lobe pulmonary artery. Negative for pulmonary embolus on left. Intraventricular septum is normal in position without findings to indicate right heart strain. Right hilar nodes enlarged measuring 1.3 cm on image 45. Additional right hilar tissue anterior to right mainstem bronchus measures 2.2 cm on image 42.  Postsurgical changes of left upper lobectomy. No pneumothorax. Moderate emphysema. Multiple pulmonary masses and nodules most consistent with pulmonary metastatic disease for example involving right upper lobe abutting the pleura measuring 4.5 x 3.2 cm on image 35. Involving the right lower lobe measuring 4.4 x 3.9 cm on image 63. Additional nodule in the right middle lobe measures 8 mm on image 58. Right lower lobe nodules measure 2 cm on image 66 and 1 cm on image 67. Multiple pulmonary nodules also noted within the left lower lobe for example anteriorly abutting pleura measuring 2.1 cm on image 36. Additional left lower lobe nodule measures 9 mm on image 44. Multiple other smaller nodules noted bilaterally.  There are multifocal mixed lytic and sclerotic osseous lesions consistent with osseous metastasis, new from comparison exam for example involving the C7 vertebral body. Mixed lytic and sclerotic lesion involving the T1 vertebral body and the right T1 pedicle and transverse process. Additional sclerotic lesion at the posterior aspect of T2 and T3. Additional small area of sclerosis involving the superior endplate of T12. Small sclerotic lesion at T8 level measuring 6 mm. Chronic deformities of left posterior sixth and seventh ribs likely related to prior thoracotomy.  Abdomen and pelvis: The liver is normal in size and contour. Spleen borderline enlarged. Normal adrenal glands. Pancreas without findings of pancreatitis. The gallbladder is present.  No pericholecystic inflammation. Kidneys demonstrate symmetric enhancement. No obstructive uropathy. Urinary bladder is thin walled. Prostate borderline enlarged.  Negative for pneumoperitoneum. Sigmoid diverticulosis. No bowel obstruction. No findings of appendicitis. Stomach and proximal small bowel normally configured. The portal vein, splenic vein, and superior mesenteric vein are patent. Moderate atherosclerotic calcifications of the abdominal aorta and branch vasculature. No fluid collection the abdomen or pelvis.  There are multiple osseous lesions consistent with metastatic disease for example sclerotic lesion involving the superior plate of L1 measuring 1.9 cm. Lytic lesion involving L2 vertebral body measuring 2.5 cm with mild height loss at the superior endplate. No retropulsed fracture fragment. Lytic lesion involving left anterior acetabulum measuring 2.5 cm. Small sclerotic lesions noted in the medial left iliac bone.      Chest: 1. Acute pulmonary emboli at the right main pulmonary artery extending into right upper lobe, right lower lobe and right middle lobe segmental pulmonary arteries. No findings of right heart strain. 2. Multiple bilateral pulmonary nodules and masses largest in right upper lobe measuring 4.5 cm and right lower lobe measuring 4.4 cm consistent with pulmonary metastatic disease. 3. Mixed lytic and sclerotic osseous metastases most pronounced at  T1. 4. Enlarged right hilar lymph nodes likely hilar metastatic adenopathy. 5. Emphysema with postsurgical changes of prior left upper lobectomy. 6. Coronary artery calcifications and additional chronic findings above.  Abdomen and pelvis: 1. No acute abnormality in the abdomen or pelvis. 2. Mixed lytic and sclerotic osseous lesions consistent with osseous metastases as above most pronounced at L2 and left anterior acetabulum. 3. Sigmoid diverticulosis without diverticulitis. 4. Additional chronic findings above.  I discussed findings with  Dr. Suggs at 9:29 a.m. on 10/5/2022.  This report was finalized on 10/5/2022 9:31 AM by Srinath Rogers MD.      XR Chest 1 View    Result Date: 10/5/2022   DATE OF EXAM: 10/5/2022 8:04 AM  PROCEDURE: XR CHEST 1 VW-  INDICATIONS: pain  COMPARISON: 10/01/2020  TECHNIQUE: Portable chest radiograph.  FINDINGS: Patient is rotated slightly to the left. Stable postoperative changes of the left hemithorax. Coronary stent noted. No pneumothorax. Chronic blunting at the left lateral costophrenic angle. There is nodular mass/consolidation overlying the periphery of the right lung which may relate to pneumonia could relate to pulmonary mass. Osseous structures are grossly intact.      Airspace disease overlying the periphery of the right midlung may relate to pulmonary mass or pneumonia. Recommend correlation with clinical findings and close follow-up to ensure resolution. Otherwise consider CT for further assessment.  Stable postsurgical changes of the left hemithorax.  This report was finalized on 10/5/2022 8:20 AM by Srinath Rogers MD.      CT Angiogram Chest    Result Date: 10/5/2022  DATE OF EXAM: 10/5/2022 8:32 AM  PROCEDURE: CT ANGIOGRAM CHEST-, CT ABDOMEN PELVIS W CONTRAST-  INDICATIONS: lung cancer with mets, coughing up blood  COMPARISON: CT chest abdomen and pelvis 10/22/2020  TECHNIQUE: Contiguous axial imaging was obtained of the chest, abdomen, and pelvis following the intravenous administration of 150 mL of Isovue 370. Reconstructed coronal and sagittal images were also obtained. Automated exposure control and iterative reconstruction methods were used.  The radiation dose reduction device was turned on for each scan per the ALARA (As Low as Reasonably Achievable) protocol.  FINDINGS: Chest: Soft tissues of the lower neck without acute abnormality. Right supraclavicular node measures 10 mm on image 12. Heart size normal. Extensive, three-vessel coronary artery calcifications. There are pulmonary emboli at the right  main pulmonary artery bifurcation extending into right upper lobe anterior segment pulmonary artery, right middle lobar pulmonary artery, and the superior segment right lower lobe pulmonary artery. Negative for pulmonary embolus on left. Intraventricular septum is normal in position without findings to indicate right heart strain. Right hilar nodes enlarged measuring 1.3 cm on image 45. Additional right hilar tissue anterior to right mainstem bronchus measures 2.2 cm on image 42.  Postsurgical changes of left upper lobectomy. No pneumothorax. Moderate emphysema. Multiple pulmonary masses and nodules most consistent with pulmonary metastatic disease for example involving right upper lobe abutting the pleura measuring 4.5 x 3.2 cm on image 35. Involving the right lower lobe measuring 4.4 x 3.9 cm on image 63. Additional nodule in the right middle lobe measures 8 mm on image 58. Right lower lobe nodules measure 2 cm on image 66 and 1 cm on image 67. Multiple pulmonary nodules also noted within the left lower lobe for example anteriorly abutting pleura measuring 2.1 cm on image 36. Additional left lower lobe nodule measures 9 mm on image 44. Multiple other smaller nodules noted bilaterally.  There are multifocal mixed lytic and sclerotic osseous lesions consistent with osseous metastasis, new from comparison exam for example involving the C7 vertebral body. Mixed lytic and sclerotic lesion involving the T1 vertebral body and the right T1 pedicle and transverse process. Additional sclerotic lesion at the posterior aspect of T2 and T3. Additional small area of sclerosis involving the superior endplate of T12. Small sclerotic lesion at T8 level measuring 6 mm. Chronic deformities of left posterior sixth and seventh ribs likely related to prior thoracotomy.  Abdomen and pelvis: The liver is normal in size and contour. Spleen borderline enlarged. Normal adrenal glands. Pancreas without findings of pancreatitis. The  gallbladder is present. No pericholecystic inflammation. Kidneys demonstrate symmetric enhancement. No obstructive uropathy. Urinary bladder is thin walled. Prostate borderline enlarged.  Negative for pneumoperitoneum. Sigmoid diverticulosis. No bowel obstruction. No findings of appendicitis. Stomach and proximal small bowel normally configured. The portal vein, splenic vein, and superior mesenteric vein are patent. Moderate atherosclerotic calcifications of the abdominal aorta and branch vasculature. No fluid collection the abdomen or pelvis.  There are multiple osseous lesions consistent with metastatic disease for example sclerotic lesion involving the superior plate of L1 measuring 1.9 cm. Lytic lesion involving L2 vertebral body measuring 2.5 cm with mild height loss at the superior endplate. No retropulsed fracture fragment. Lytic lesion involving left anterior acetabulum measuring 2.5 cm. Small sclerotic lesions noted in the medial left iliac bone.      Chest: 1. Acute pulmonary emboli at the right main pulmonary artery extending into right upper lobe, right lower lobe and right middle lobe segmental pulmonary arteries. No findings of right heart strain. 2. Multiple bilateral pulmonary nodules and masses largest in right upper lobe measuring 4.5 cm and right lower lobe measuring 4.4 cm consistent with pulmonary metastatic disease. 3. Mixed lytic and sclerotic osseous metastases most pronounced at  T1. 4. Enlarged right hilar lymph nodes likely hilar metastatic adenopathy. 5. Emphysema with postsurgical changes of prior left upper lobectomy. 6. Coronary artery calcifications and additional chronic findings above.  Abdomen and pelvis: 1. No acute abnormality in the abdomen or pelvis. 2. Mixed lytic and sclerotic osseous lesions consistent with osseous metastases as above most pronounced at L2 and left anterior acetabulum. 3. Sigmoid diverticulosis without diverticulitis. 4. Additional chronic findings above.  I  discussed findings with Dr. Suggs at 9:29 a.m. on 10/5/2022.  This report was finalized on 10/5/2022 9:31 AM by Srinath Rogers MD.                    Plan for Follow-up of Pending Labs/Results:     Discharge Details        Discharge Medications      New Medications      Instructions Start Date   acetaminophen 325 MG tablet  Commonly known as: TYLENOL   650 mg, Oral, Every 4 Hours PRN      apixaban 5 MG tablet tablet  Commonly known as: ELIQUIS   10 mg, Oral, Every 12 Hours Scheduled      apixaban 5 MG tablet tablet  Commonly known as: ELIQUIS   5 mg, Oral, Every 12 Hours Scheduled   Start Date: October 13, 2022     fentaNYL 25 MCG/HR patch  Commonly known as: DURAGESIC   1 patch, Transdermal, Every 72 Hours      ondansetron 4 MG tablet  Commonly known as: ZOFRAN   4 mg, Oral, Every 6 Hours PRN      oxyCODONE 10 MG tablet  Commonly known as: ROXICODONE   10 mg, Oral, Every 4 Hours PRN         Changes to Medications      Instructions Start Date   irbesartan 150 MG tablet  Commonly known as: AVAPRO  What changed: how much to take   75 mg, Oral, Daily         Continue These Medications      Instructions Start Date   aspirin 81 MG EC tablet   81 mg, Oral, Daily      pantoprazole 40 MG EC tablet  Commonly known as: PROTONIX   40 mg, Oral, 2 Times Daily      Repatha SureClick solution auto-injector SureClick injection  Generic drug: Evolocumab   140 mg, Subcutaneous, Every 14 Days         Stop These Medications    carvedilol 12.5 MG tablet  Commonly known as: COREG     naproxen sodium 220 MG tablet  Commonly known as: ALEVE     vitamin B-12 500 MCG tablet  Commonly known as: CYANOCOBALAMIN     Vitamin D3 50 MCG (2000 UT) tablet            No Known Allergies      Discharge Disposition:  Home or Self Care    Diet:  Hospital:  Diet Order   Procedures   • Diet Regular       Activity:As tolerated  Activity Instructions     Activity as Tolerated            Restrictions or Other Recommendations:         CODE STATUS:    Code  Status and Medical Interventions:   Ordered at: 10/05/22 1228     Medical Intervention Limits:    NO intubation (DNI)    NO vasopressors    NO dialysis     Code Status (Patient has no pulse and is not breathing):    No CPR (Do Not Attempt to Resuscitate)     Medical Interventions (Patient has pulse or is breathing):    Limited Support     Comments:    d/w patient       No future appointments.    Additional Instructions for the Follow-ups that You Need to Schedule     Discharge Follow-up with Specified Provider: Hospice to follow at home   As directed      To: Hospice to follow at home                     MANA Penaloza  10/07/22      Time Spent on Discharge:  I spent  50 minutes on this discharge activity which included: face-to-face encounter with the patient, reviewing the data in the system, coordination of the care with the nursing staff as well as consultants, documentation, and entering orders.        Electronically signed by MANA Penaloza, 10/07/22, 11:24 AM EDT.

## 2022-10-07 NOTE — PROGRESS NOTES
Palliative Care Progress Note    Date of Admission: 10/5/2022    Subjective: Patient does continue to complain about some pain primarily in his shoulder.  It is very difficult to determine if the pain is somewhat improved or not as the patient is somewhat tangential during conversation.  Current Code Status     Date Active Code Status Order ID Comments User Context       10/5/2022 1228 No CPR (Do Not Attempt to Resuscitate) [id number]  Chica Stacy MD ED     Advance Care Planning Activity      Questions for Current Code Status     Question Answer    Code Status (Patient has no pulse and is not breathing) No CPR (Do Not Attempt to Resuscitate)    Medical Interventions (Patient has pulse or is breathing) Limited Support    Medical Intervention Limits: NO intubation (DNI)     NO vasopressors     NO dialysis    Comments d/w patient        No current facility-administered medications on file prior to encounter.     Current Outpatient Medications on File Prior to Encounter   Medication Sig Dispense Refill   • aspirin 81 MG EC tablet Take 81 mg by mouth Daily.     • carvedilol (COREG) 12.5 MG tablet Take 1 tablet by mouth 2 (Two) Times a Day With Meals. 180 tablet 1   • Cholecalciferol (VITAMIN D3) 50 MCG (2000 UT) tablet Take  by mouth Daily.     • irbesartan (AVAPRO) 150 MG tablet Take 1 tablet by mouth Daily. 90 tablet 1   • naproxen sodium (ALEVE) 220 MG tablet Take 220 mg by mouth 2 (Two) Times a Day As Needed.     • pantoprazole (PROTONIX) 40 MG EC tablet Take 40 mg by mouth 2 (Two) Times a Day.     • vitamin B-12 (CYANOCOBALAMIN) 500 MCG tablet Take 500 mcg by mouth Daily.     • Repatha SureClick solution auto-injector SureClick injection Inject 140 mg under the skin into the appropriate area as directed Every 14 (Fourteen) Days.          •  acetaminophen  •  ondansetron  •  oxyCODONE  •  sodium chloride  •  [COMPLETED] Insert peripheral IV **AND** sodium chloride  •  sodium chloride    Objective: /54    "Pulse 53   Temp 98 °F (36.7 °C) (Oral)   Resp 18   Ht 177.8 cm (70\")   Wt 93 kg (205 lb)   SpO2 97%   BMI 29.41 kg/m²      Intake/Output Summary (Last 24 hours) at 10/7/2022 1146  Last data filed at 10/7/2022 0900  Gross per 24 hour   Intake 1056.64 ml   Output 2435 ml   Net -1378.36 ml     Physical Exam:      General Appearance:    Alert, cooperative, in no acute distress   Head:    Normocephalic, without obvious abnormality, atraumatic   Eyes:            Lids and lashes normal, conjunctivae and sclerae normal, no   icterus, no pallor, corneas clear, PERRLA   Ears:    Ears appear intact with no abnormalities noted   Throat:   No oral lesions, no thrush, oral mucosa moist   Neck:   No adenopathy, supple, trachea midline, no thyromegaly, no   carotid bruit, no JVD   Back:     No kyphosis present, no scoliosis present, no skin lesions,      erythema or scars, no tenderness to percussion or                   palpation,   range of motion normal   Lungs:     Clear to auscultation,respirations regular, even and                  unlabored    Heart:    Regular rhythm and normal rate, normal S1 and S2, no            murmur, no gallop, no rub, no click   Chest Wall:    No abnormalities observed   Abdomen:     Normal bowel sounds, no masses, no organomegaly, soft        non-tender, non-distended, no guarding, no rebound                tenderness   Rectal:     Deferred   Extremities:   Moves all extremities well, no edema, no cyanosis, no             redness   Pulses:   Pulses palpable and equal bilaterally   Skin:   No bleeding, bruising or rash   Lymph nodes:   No palpable adenopathy   Neurologic:   Cranial nerves 2 - 12 grossly intact, sensation intact, DTR       present and equal bilaterally     Results from last 7 days   Lab Units 10/05/22  0710   WBC 10*3/mm3 9.85   HEMOGLOBIN g/dL 15.0   HEMATOCRIT % 45.4   PLATELETS 10*3/mm3 146     Results from last 7 days   Lab Units 10/05/22  0710   SODIUM mmol/L 142 "   POTASSIUM mmol/L 4.3   CHLORIDE mmol/L 103   CO2 mmol/L 24.0   BUN mg/dL 19   CREATININE mg/dL 0.93   CALCIUM mg/dL 10.1   BILIRUBIN mg/dL 0.5   ALK PHOS U/L 176*   ALT (SGPT) U/L 18   AST (SGOT) U/L 22   GLUCOSE mg/dL 103*       Impression: Metastatic lung cancer  Neoplastic pain  Neuropathic pain  Plan: At this point time I am going to stop the patient's MS Contin as he feels that it is not helping.  I will put the patient on a fentanyl patch.  I will DC IV opioids and continued patient on oxycodone.  Did tell him from a palliative standpoint he is okay to be discharged home.        Frandy Hong DO  10/07/22  11:46 EDT       negative...

## 2022-10-08 NOTE — OUTREACH NOTE
Prep Survey    Flowsheet Row Responses   [facility] facility patient discharged from? [facility]   Is LACE score < 7 ? No   Emergency Room discharge w/ pulse ox? No   Eligibility Not Eligible   What are the reasons patient is not eligible? Hospice/Pallative Care  [[facility] NAVIGATORS FRA - HOSPICE]   Does the patient have one of the following disease processes/diagnoses(primary or secondary)? Other   Prep survey completed? Yes          DIANDRA SOTO - Registered Nurse

## 2022-10-18 RX ORDER — PANTOPRAZOLE SODIUM 40 MG/1
TABLET, DELAYED RELEASE ORAL
Qty: 60 TABLET | Refills: 0 | Status: SHIPPED | OUTPATIENT
Start: 2022-10-18

## (undated) DEVICE — GLV SURG BIOGEL LTX PF 7 1/2

## (undated) DEVICE — DRSNG WND BORDR/ADHS NONADHR/GZ LF 4X14IN STRL

## (undated) DEVICE — GLV SURG BIOGEL LTX PF 8

## (undated) DEVICE — ECHELON FLEX  POWERED VASCULAR STAPLER WITH ADVANCED PLACEMENT TIP, 35MM: Brand: ECHELON FLEX

## (undated) DEVICE — GW INQWIRE FC PTFE STD J/1.5 .035 260

## (undated) DEVICE — KODAMA CATHETER, P/N 701470CONTENTS: IMAGING CATHETER, 3 ML SYRINGE, 10 ML SYRINGE, EXTENSION SET, STERILE BAG, IFU: Brand: ACIST KODAMA® CORONARY IMAGING CATHETER

## (undated) DEVICE — DECANT BG O JET

## (undated) DEVICE — PK CATH CARD 10

## (undated) DEVICE — CATH DIAG EXPO .045 FL3  5F 100CM

## (undated) DEVICE — 3M™ MEDIPORE™ H SOFT CLOTH SURGICAL TAPE, 2863, 3 IN X 10 YD, 12/CASE: Brand: 3M™ MEDIPORE™

## (undated) DEVICE — GUIDE CATHETER: Brand: MACH1™

## (undated) DEVICE — INTRO SHEATH PRELUDE IDEAL SPRNG COIL 021 6F 23X80CM

## (undated) DEVICE — DEV COMP RAD PRELUDESYNC 24CM

## (undated) DEVICE — MODEL BT2000 P/N 700287-012KIT CONTENTS: MANIFOLD WITH SALINE AND CONTRAST PORTS, SALINE TUBING WITH SPIKE AND HAND SYRINGE, TRANSDUCER: Brand: BT2000 AUTOMATED MANIFOLD KIT

## (undated) DEVICE — CVR HNDL LIGHT RIGID

## (undated) DEVICE — HI-TORQUE VERSATURN F GUIDE WIRE FULLY COATED .014 STRAIGHT TIP 190 CM: Brand: HI-TORQUE VERSATURN

## (undated) DEVICE — MODEL AT P65, P/N 701554-001KIT CONTENTS: HAND CONTROLLER, 3-WAY HIGH-PRESSURE STOPCOCK WITH ROTATING END AND PREMIUM HIGH-PRESSURE TUBING: Brand: ANGIOTOUCH® KIT

## (undated) DEVICE — SPNG GZ WOVN 4X4IN 12PLY 10/BX STRL

## (undated) DEVICE — CATH DIAG EXPO M/ PK 5F FL4/FR4 PIG

## (undated) DEVICE — ECHELON FLEX 60 ARTICULATING ENDOSCOPIC LINEAR CUTTER (NO CARTRIDGE): Brand: ECHELON FLEX ENDOPATH

## (undated) DEVICE — SUT VIC 2 TP1 54IN J880T